# Patient Record
Sex: FEMALE | Race: WHITE | NOT HISPANIC OR LATINO | Employment: OTHER | URBAN - METROPOLITAN AREA
[De-identification: names, ages, dates, MRNs, and addresses within clinical notes are randomized per-mention and may not be internally consistent; named-entity substitution may affect disease eponyms.]

---

## 2017-01-12 ENCOUNTER — HOSPITAL ENCOUNTER (OUTPATIENT)
Dept: RADIOLOGY | Facility: HOSPITAL | Age: 62
Discharge: HOME | End: 2017-01-12
Attending: ORTHOPAEDIC SURGERY
Payer: MEDICARE

## 2017-01-12 ENCOUNTER — ALLSCRIPTS OFFICE VISIT (OUTPATIENT)
Dept: OTHER | Facility: OTHER | Age: 62
End: 2017-01-12

## 2017-01-12 DIAGNOSIS — M99.83 OTHER BIOMECHANICAL LESIONS OF LUMBAR REGION: ICD-10-CM

## 2017-01-12 PROCEDURE — 72070 X-RAY EXAM THORAC SPINE 2VWS: CPT

## 2017-02-17 ENCOUNTER — ALLSCRIPTS OFFICE VISIT (OUTPATIENT)
Dept: OTHER | Facility: OTHER | Age: 62
End: 2017-02-17

## 2017-02-27 ENCOUNTER — GENERIC CONVERSION - ENCOUNTER (OUTPATIENT)
Dept: OTHER | Facility: OTHER | Age: 62
End: 2017-02-27

## 2017-02-28 ENCOUNTER — GENERIC CONVERSION - ENCOUNTER (OUTPATIENT)
Dept: OTHER | Facility: OTHER | Age: 62
End: 2017-02-28

## 2017-03-20 ENCOUNTER — GENERIC CONVERSION - ENCOUNTER (OUTPATIENT)
Dept: OTHER | Facility: OTHER | Age: 62
End: 2017-03-20

## 2017-03-23 ENCOUNTER — GENERIC CONVERSION - ENCOUNTER (OUTPATIENT)
Dept: OTHER | Facility: OTHER | Age: 62
End: 2017-03-23

## 2017-05-08 ENCOUNTER — ALLSCRIPTS OFFICE VISIT (OUTPATIENT)
Dept: OTHER | Facility: OTHER | Age: 62
End: 2017-05-08

## 2017-07-07 ENCOUNTER — GENERIC CONVERSION - ENCOUNTER (OUTPATIENT)
Dept: OTHER | Facility: OTHER | Age: 62
End: 2017-07-07

## 2017-07-31 ENCOUNTER — ALLSCRIPTS OFFICE VISIT (OUTPATIENT)
Dept: OTHER | Facility: OTHER | Age: 62
End: 2017-07-31

## 2017-11-21 ENCOUNTER — GENERIC CONVERSION - ENCOUNTER (OUTPATIENT)
Dept: OTHER | Facility: OTHER | Age: 62
End: 2017-11-21

## 2017-12-01 ENCOUNTER — GENERIC CONVERSION - ENCOUNTER (OUTPATIENT)
Dept: OTHER | Facility: OTHER | Age: 62
End: 2017-12-01

## 2017-12-14 ENCOUNTER — GENERIC CONVERSION - ENCOUNTER (OUTPATIENT)
Dept: OTHER | Facility: OTHER | Age: 62
End: 2017-12-14

## 2017-12-15 ENCOUNTER — GENERIC CONVERSION - ENCOUNTER (OUTPATIENT)
Dept: OTHER | Facility: OTHER | Age: 62
End: 2017-12-15

## 2018-01-02 ENCOUNTER — GENERIC CONVERSION - ENCOUNTER (OUTPATIENT)
Dept: OTHER | Facility: OTHER | Age: 63
End: 2018-01-02

## 2018-01-09 NOTE — MISCELLANEOUS
Message   Recorded as Task   Date: 03/23/2017 01:16 PM, Created By: Michael Kaplan   Task Name: Care Coordination   Assigned To: [facility]   Regarding Patient: Virgil Newton, Status: Active   Comment:    Marshall Burns - 23 Mar 2017 1:16 PM     TASK CREATED  Caller: Self; Care Coordination; [phone] (Home)  s/w pt  States she is having a problem w/ her SCS  Has to turn it up to get relief of back pain but the higher frequency is causing pins and needles in her feet  Advised pt, contact [address] [place] [address], this office will fu as well  Pt states she is concerned because she is scheduled for surgery on Tuesday - surgical wound on her arm needs to be "cleaned out"  Pt states that she is aware it will be more painful than the original surgery  States they will give her a rx for vicodin  Pt is questioning how to proceed, states she is to come in for a repeat uds as her last drug screen was + for thc  Pt states she does not smoke pot but her grandson smoked pot in front of her  Advised pt, this office is not managing her pain medications  Ok to take a script from her surgeon for post operative pain  Disclose all medications on the form for the UDS - DG will review and fu if necessary  Pt verbalized understanding  Will fu w/ [facility]  DG aware   Marshall Burns - 23 Mar 2017 3:05 PM     TASK EDITED  *** FYI ***  Per Email from Benjy Romero: s/w pt, offered to meet the pt on Monday  Pt stated she is waiting for a cb from this office re: a possible apt w/ DG  S/w Pt, advised pt to proceed as discussed: ok to take the rx from surgeon for post surgical pain as this office is not managing her pain medicine  FU w/ [facility] re: scs  Next ov w/ DG is 5/12  Pt verbalized understanding  Benjy Romero is aware via email  Sean Garcia - 23 Mar 2017 3:13 PM     TASK REPLIED TO: Previously Assigned To Sean Garcia  Provider aware  Thank you  Active Problems    1   Chronic low back pain (074 2,131 81) (M54 5,G89 29)   2  Chronic myofascial pain (729 1,338 29) (M79 1,G89 29)   3  Chronic pain syndrome (338 4) (G89 4)   4  Encounter for long-term opiate analgesic use (V58 69) (Z79 891)   5  Left lumbar radiculitis (724 4) (M54 16)   6  Lumbar foraminal stenosis (724 02) (M99 83)   7  Osteoarthritis of lumbar spine with myelopathy (721 42) (M47 16)   8  Postprocedural state (V45 89) (Z98 890)   9  Pre-procedural examination (V72 84) (Z01 818)   10  Spondylolisthesis at L5-S1 level (756 12) (M43 17)   11  Uncomplicated opioid dependence (304 00) (F11 20)    Current Meds   1  Abilify 15 MG Oral Tablet (ARIPiprazole); Therapy: (Recorded:86Adv1410) to Recorded   2  AmLODIPine Besylate 5 MG Oral Tablet; Therapy: ([facility]) to Recorded   3  BuPROPion HCl ER (SR) 150 MG Oral Tablet Extended Release 12 Hour; Therapy: ([facility]) to Recorded   4  ClonazePAM 1 MG Oral Tablet; Therapy: ([facility]) to Recorded   5  [employer] Vitamin D3 CAPS; Therapy: ([facility]) to Recorded   6  Cyclobenzaprine HCl - 10 MG Oral Tablet; Take 1 po bid prn; Therapy: 00ZOM7629 to (Evaluate:97Ovi3365)  Requested for: 65IFC5607; Last   Rx:88Zfn0076 Ordered   7  FLUoxetine HCl - 40 MG Oral Capsule; Therapy: ([facility]) to Recorded   8  Hydrocodone-Acetaminophen 5-325 MG Oral Tablet; Therapy: ([facility]) to Recorded   9  Isosorbide Mononitrate 30 MG TB24;   Therapy: (Recorded:76Qsz0144) to Recorded   10  Metoprolol Tartrate 25 MG Oral Tablet; Therapy: ([facility]) to Recorded   11  Simvastatin 20 MG Oral Tablet; Therapy: ([facility]) to Recorded   12  TraZODone HCl - 50 MG Oral Tablet; Therapy: (Recorded:72Jfw4695) to Recorded    Allergies    1   No Known Drug Allergies    Signatures   Electronically signed by : Amber Ash, ; Mar 23 2017  3:16PM EST                       (Author)

## 2018-01-11 NOTE — MISCELLANEOUS
Message   Recorded as Task   Date: 11/21/2017 10:35 AM, Created By: Lizzette Israel   Task Name: Miscellaneous   Assigned To: Lizzette Israel   Regarding Patient: Saintclair Clinton, Status: Active   CommentArnell [place] - 21 Nov 2017 10:35 AM     TASK CREATED  Patient missed her appt  11/21/17 @ 10:15 AM with Sean  I called the patient & LMOM about the missed appt  & told her to call the office to reschedSean Bergman - 21 Nov 2017 10:47 AM     TASK REPLIED TO: Previously Assigned To Sean Garcia  Provider aware  Thank you  Active Problems    1  Chronic low back pain (724 2,338 29) (M54 5,G89 29)   2  Chronic lumbar radiculopathy (724 4) (M54 16)   3  Chronic myofascial pain (729 1,338 29) (M79 1,G89 29)   4  Chronic pain syndrome (338 4) (G89 4)   5  Encounter for long-term opiate analgesic use (V58 69) (Z79 891)   6  Lumbar foraminal stenosis (724 02) (M99 83)   7  Osteoarthritis of lumbar spine with myelopathy (721 42) (M47 16)   8  Postprocedural state (V45 89) (Z98 890)   9  Pre-procedural examination (V72 84) (Z01 818)   10  Spondylolisthesis at L5-S1 level (756 12) (M43 17)   11  Uncomplicated opioid dependence (304 00) (F11 20)    Current Meds   1  AmLODIPine Besylate 5 MG Oral Tablet; Therapy: (Recorded:10Bkz3262) to Recorded   2  BuPROPion HCl ER (SR) 150 MG Oral Tablet Extended Release 12 Hour; Therapy: ([facility]) to Recorded   3  ClonazePAM 1 MG Oral Tablet; Therapy: ([facility]) to Recorded   4  Cyclobenzaprine HCl - 10 MG Oral Tablet; Take 1 po bid prn; Therapy: 76ZFD9915 to (Deidre Silva)  Requested for: 70MIA6381; Last   Rx:55Cxm3609 Ordered   5  FLUoxetine HCl - 40 MG Oral Capsule; Therapy: (Ane Rho) to Recorded   6  Isosorbide Mononitrate 30 MG TB24;   Therapy: (Recorded:85Cec7354) to Recorded   7  Meloxicam 7 5 MG Oral Tablet; TAKE 1 TABLET Twice daily with food, no other NSAIDS;    Therapy: 86XLQ3145 to (Deidre Silva)  Requested for: 79NMT3770; Last   Rx:60Aij7332; Status: ACTIVE - Renewal Denied Ordered   8  Metoprolol Tartrate 25 MG Oral Tablet; Therapy: (Katie Cherry) to Recorded   9  Potassium Chloride ER 10 MEQ Oral Capsule Extended Release Recorded   10  Simvastatin 20 MG Oral Tablet; Therapy: (Katie Cherry) to Recorded   11  TraZODone HCl - 50 MG Oral Tablet; Therapy: (Recorded:02Aqz4134) to Recorded    Allergies    1  No Known Drug Allergies    2  Tape    Signatures   Electronically signed by :  Roc Thornton, ; Nov 21 2017 11:32AM EST                       (Author)

## 2018-01-13 VITALS
HEART RATE: 80 BPM | DIASTOLIC BLOOD PRESSURE: 82 MMHG | HEIGHT: 63 IN | TEMPERATURE: 98.3 F | WEIGHT: 280 LBS | SYSTOLIC BLOOD PRESSURE: 132 MMHG | BODY MASS INDEX: 49.61 KG/M2

## 2018-01-13 VITALS
HEIGHT: 63 IN | HEART RATE: 76 BPM | BODY MASS INDEX: 50.85 KG/M2 | WEIGHT: 287 LBS | DIASTOLIC BLOOD PRESSURE: 80 MMHG | TEMPERATURE: 98.4 F | SYSTOLIC BLOOD PRESSURE: 136 MMHG

## 2018-01-13 NOTE — RESULT NOTES
Message   Recorded as Task   Date: 07/06/2017 12:10 PM, Created By: Kaylene Arthur   Task Name: Miscellaneous   Assigned To: [facility]   Regarding Patient: Chance Castellano, Status: Active   CommentSameul Last - 06 Jul 2017 12:10 PM     TASK CREATED  Caller: Answering Service, Other; Other; [phone]  See below message from service  Message: PT NEEDS CLARIFICATION ON HER MEDS  FOR HER BACK PAIN  SethStacey - 06 Jul 2017 3:10 PM     TASK EDITED  Attempted to call the pt to clarify and LMOM to CB  SethStacey - 06 Jul 2017 3:10 PM     TASK IN PROGRESS   July Potter - 07 Jul 2017 1:14 PM     TASK EDITED  phone call from patient questioning why she only recieved a one month refill of meloxicam with no refills, since she is not coming back to the office for six months  please call patient at [phone]  SethStacey - 07 Jul 2017 1:18 PM     TASK EDITED  Looks like last ordered 5/17 c/ 2 refills  DG to advise  Sean Mendoza - 07 Jul 2017 1:25 PM     TASK REPLIED TO: Previously Assigned To Sean Garcia  She has an upcoming OV in July and it was a 3 month supply and she should have enough to get to her OV  SethShilay - 07 Jul 2017 1:36 PM     TASK EDITED  S/w the pt  and she stated she is out of meds  Bartholome Pinks Bartholome Pinks Called the pharmacy and the order was for BID and #30 was ordered  Yup, that is why she is out  Can you please reorder? Thanks Jeff Breaux - 07 Jul 2017 1:39 PM     TASK REPLIED TO: Previously Assigned To Sean Garcia  I escribed a script for BID dosing, #60 and that should get her to her next OV at the end of this month  Shila Ann - 07 Jul 2017 2:38 PM     TASK EDITED  S/w the pt  and she is aware          Signatures   Electronically signed by : Marty Matias, ; Jul 7 2017  2:38PM EST                       (Author)

## 2018-01-14 VITALS
HEART RATE: 80 BPM | TEMPERATURE: 98.5 F | BODY MASS INDEX: 51.91 KG/M2 | SYSTOLIC BLOOD PRESSURE: 120 MMHG | WEIGHT: 293 LBS | DIASTOLIC BLOOD PRESSURE: 82 MMHG | HEIGHT: 63 IN

## 2018-01-14 VITALS
BODY MASS INDEX: 49.6 KG/M2 | HEART RATE: 73 BPM | SYSTOLIC BLOOD PRESSURE: 145 MMHG | WEIGHT: 280 LBS | DIASTOLIC BLOOD PRESSURE: 85 MMHG

## 2018-01-14 NOTE — RESULT NOTES
Message   Recorded as Task   Date: 05/16/2016 02:41 PM, Created By: Sandra Ramos   Task Name: Follow Up   Assigned To: Kristy Armstrong   Regarding Patient: WOODY WHEELER, Status: In Progress   Comment:    Shila Ann - 16 May 2016 2:41 PM     TASK CREATED  Caller: Self; General Medical Question; [phone] (Home)  Received a vmlom at 1429  Pt  stated she would like to start the process to have an SCS trial  She has a couple of questions in relation to the process  Please advise  Thanks   Mariam Gibson - 74 May 2016 4:01 PM     TASK EDITED  Spoke with pt who wasn't sure what to do next with the steps for the trial  Advised pt that the next step would be having the psych eval done   Pt states she did get a piece of paper at her last visit and will attempt to make an appt with her psychologist    Dong Sorto - 24 Jun 2016 11:29 AM     TASK EDITED  S/w pt for update on psych eval  -states she has appt scheduled for 7/19/16 for psych eval  -pt questioned next step & informed her that as soon as we receive the psych eval report that we will forward onto [facility] to continue auth process  -pt verbalized understanding   Kristy Armstrong - 04 Aug 2016 10:03 AM     TASK EDITED  Received psych eval-waiting for approval & then can schedule   Kristy Armstrong - 08 Aug 2016 10:12 AM     TASK EDITED  Psych eval approved by Dr Josue Taylor - 15 Aug 2016 8:38 AM     TASK EDITED  Waiting on [facility] approval   Kristy Armstrong - 17 Aug 2016 8:58 AM     TASK EDITED  Approved-ok to schedule   Kristy Armstrong - 22 Aug 2016 11:27 AM     TASK EDITED  Scheduled   Pre - 9/8/16  Trial - 9/12/16  Pull - 9/16

## 2018-01-15 NOTE — MISCELLANEOUS
Message   Recorded as Task   Date: 09/27/2016 03:08 PM, Created By: Ale Hairston   Task Name: Follow Up   Assigned To: [facility]   Regarding Patient: Patricia Man, Status: In Progress   Comment:    Lisa Shea - 27 Sep 2016 3:08 PM     TASK CREATED  Caller: Self; General Medical Question; [phone] (Home)  Received VM from pt  on [facility] triage line from 245 pm  Pt  states that someone called, she is returning the call  Pt  states she can be reached at [phone]  **Appears  attempted to reach pt  yest  Do not see any notes stating pt  was called today  Paulo Mcmahon - 27 Sep 2016 3:43 PM     TASK EDITED  S/w pt  who states that someone had called to provide her Dr Rosa Pedro number  Pt  states that she already had found the number, and called to schedule her appt  Pt  appreciative of the c/b  Lisa Shea - 27 Sep 2016 3:43 PM     TASK IN PROGRESS        Active Problems    1  Chronic low back pain (724 2,338 29) (M54 5,G89 29)   2  Chronic myofascial pain (729 1,338 29) (M79 1,G89 29)   3  Chronic pain syndrome (338 4) (G89 4)   4  Left lumbar radiculitis (724 4) (M54 16)   5  Lumbar foraminal stenosis (724 02) (M99 83)   6  Osteoarthritis of lumbar spine with myelopathy (721 42) (M47 16)   7  Postprocedural state (V45 89) (Z98 89)   8  Pre-procedural examination (V72 84) (Z01 818)   9  Spondylolisthesis at L5-S1 level (756 12) (M43 17)    Current Meds   1  Abilify 15 MG Oral Tablet (ARIPiprazole); Therapy: (Recorded:66Wup8753) to Recorded   2  AmLODIPine Besylate 5 MG Oral Tablet; Therapy: (Norlin Kroner) to Recorded   3  Aspirin 81 MG CAPS; Therapy: (Recorded:97Blj0440) to Recorded   4  BuPROPion HCl ER (SR) 150 MG Oral Tablet Extended Release 12 Hour; Therapy: (Norlin Kroner) to Recorded   5   Cephalexin 500 MG Oral Capsule; TAKE 1 CAPSULE 4 TIMES DAILY UNTIL GONE;   Therapy: 57XFT9702 to (Nithin April)  Requested for: 40UJG1962; Last   Rx:58Qua3602 Ordered   6  ClonazePAM 1 MG Oral Tablet; Therapy: (Laura Prince) to Recorded   7  [employer]S Vitamin D3 CAPS; Therapy: (Laura Prince) to Recorded   8  Cyclobenzaprine HCl - 10 MG Oral Tablet; Take 1 in the AM, 1 at dinner time, and 1 at   bed for now; Therapy: 02RIW2066 to (Evaluate:08Nov2015); Last Rx:09Oct2015 Ordered   9  DrRx Flexeril 10 MG #30 Recorded   10  FLUoxetine HCl - 40 MG Oral Capsule; Therapy: (Laura Prince) to Recorded   11  Hydrocodone-Acetaminophen 5-325 MG Oral Tablet; Therapy: (Laura Prince) to Recorded   12  Isosorbide Mononitrate 30 MG TB24;    Therapy: (Recorded:28Sep2015) to Recorded   13  Metoprolol Tartrate 25 MG Oral Tablet; Therapy: (Laura Prince) to Recorded   14  Omeprazole 40 MG CPCR; Therapy: (Laura Prince) to Recorded   15  Simvastatin 20 MG Oral Tablet; Therapy: (Recorded:28Sep2015) to Recorded   16  TraZODone HCl - 50 MG Oral Tablet; Therapy: (Laura Prince) to Recorded   17  Vicodin 5-300 MG Oral Tablet Recorded    Allergies    1   No Known Drug Allergies    Signatures   Electronically signed by : Janet Arechiga RN; Sep 27 2016  3:43PM EST                       (Author)

## 2018-01-15 NOTE — MISCELLANEOUS
Message   Recorded as Task   Date: 02/23/2017 04:07 PM, Created By: Bobby Smith   Task Name: Follow Up   Assigned To: [facility]   Regarding Patient: Fito Jacome, Status: Active   CommentClara Edu - 23 Feb 2017 4:07 PM     TASK CREATED  Can you please call the patient and advise her that her baseline UDS that we performed in the office was positive for THC, the metabolite for marijuana  Unfortunately, this would not allow us to take over prescribing any opioids for her as discussed at her OV  She should f/u as scheduled in 3 months for her SCStim re-eval  Thank you  Marshall Burns - 24 Feb 2017 11:56 AM     TASK EDITED  s/w pt, advised of above  Pt denied smoking marijuana  Questioned if there is an issue with someone smoking marijuana in the house  Advised pt, that could be a problem  Also, products containing marijuana or hemp may be a problem  Pt states she will check her products  Advised pt to cb w/ questions or concerns  Confirmed ov to re-eval stim  Pt verbalized understanding  Sean Garcia - 27 Feb 2017 6:34 AM     TASK REPLIED TO: Previously Assigned To Sean Garcia  Provider aware  For now we will not be able to prescribe opioids  We will re-evaluate in the future  Thank you  Marshall Burns - 27 Feb 2017 8:43 AM     TASK EDITED  aware  addressed in 2/24/17 task        Active Problems    1  Chronic low back pain (724 2,338 29) (M54 5,G89 29)   2  Chronic myofascial pain (729 1,338 29) (M79 1,G89 29)   3  Chronic pain syndrome (338 4) (G89 4)   4  Encounter for long-term opiate analgesic use (V58 69) (Z79 891)   5  Left lumbar radiculitis (724 4) (M54 16)   6  Lumbar foraminal stenosis (724 02) (M99 83)   7  Osteoarthritis of lumbar spine with myelopathy (721 42) (M47 16)   8  Postprocedural state (V45 89) (Z98 890)   9  Pre-procedural examination (V72 84) (Z01 818)   10  Spondylolisthesis at L5-S1 level (756 12) (M43 17)   11   Uncomplicated opioid dependence (304 00) (F11 20)    Current Meds   1  Abilify 15 MG Oral Tablet (ARIPiprazole); Therapy: (Recorded:10Xra4026) to Recorded   2  AmLODIPine Besylate 5 MG Oral Tablet; Therapy: (Jc Diaz) to Recorded   3  BuPROPion HCl ER (SR) 150 MG Oral Tablet Extended Release 12 Hour; Therapy: (Jc Diaz) to Recorded   4  ClonazePAM 1 MG Oral Tablet; Therapy: (Jc Diaz) to Recorded   5  CVS Vitamin D3 CAPS; Therapy: (Jc Diaz) to Recorded   6  Cyclobenzaprine HCl - 10 MG Oral Tablet; Take 1 po bid prn; Therapy: 64YFW7944 to (Evaluate:03Ssz2633)  Requested for: 97CER6543; Last   Rx:24Cwt1242 Ordered   7  FLUoxetine HCl - 40 MG Oral Capsule; Therapy: (Jc Diaz) to Recorded   8  Hydrocodone-Acetaminophen 5-325 MG Oral Tablet; Therapy: (Jc Diaz) to Recorded   9  Isosorbide Mononitrate 30 MG TB24;   Therapy: (Recorded:89Oay0041) to Recorded   10  Metoprolol Tartrate 25 MG Oral Tablet; Therapy: (Jc Diaz) to Recorded   11  Simvastatin 20 MG Oral Tablet; Therapy: (Jc Diaz) to Recorded   12  TraZODone HCl - 50 MG Oral Tablet; Therapy: (Recorded:73Ttf6725) to Recorded    Allergies    1   No Known Drug Allergies    Signatures   Electronically signed by : Rin Rey, ; Feb 27 2017  8:43AM EST                       (Author)

## 2018-01-16 NOTE — MISCELLANEOUS
Message   Recorded as Task   Date: 02/24/2017 01:08 PM, Created By: Viry Leon   Task Name: Follow Up   Assigned To: [facility]   Regarding Patient: Krishna Watson, Status: In Progress   Comment:    Lisa Shea - 24 Feb 2017 1:08 PM     TASK CREATED  Caller: Self; General Medical Question; [phone] (Home)  Received VM from pt  on [facility] triage line from 21  pm  Pt  states that she is calling in regards to a call she received earlier about a UDS  Pt  states that she was told there was "THC in my blood " Per pt  she needs to s/w someone because "this has to be a mistake " Per pt  "I haven't smoked pot in years " Pt  requesting c/b at [phone]  Marshall Burns - 24 Feb 2017 2:44 PM     TASK EDITED  s/w pt, states something must be wrong - she hasn't smoked pot in years, "it makes me cough my head off "  Pt states her grandson smokes pot in his room  Questioned if that could be in her system  Advised pt - will relay this info to DG  It is up to DG to assess the situation and make a determination  At this point - he is not willing to prescribe opioids due to thc in the pt's urine  Pt stated that she is going to tell her grandson that he has to smoke pot outside from now on  Advised pt, will d/w DG and cb to advise  Sean Garcia - 27 Feb 2017 6:34 AM     TASK REPLIED TO: Previously Assigned To Sean Garcia  Provider aware  For now we will not prescribe any opioids  This would have to be re-evaluated in the future  Thank you  Marshall Burns - 27 Feb 2017 8:47 AM     TASK EDITED  LMOM to cb on home and cell #s  Marshall Burns - 27 Feb 2017 11:57 AM     TASK EDITED  vmm 2/27/2017 @ 1023  Pt calling  States she needs to s/w someone re: medication approval  Pt states she is scheduled for surgery at 12 - noon, anticipates she will be home by 3pm - will cb   Ok to reach pt at:  cell [phone], home [phone]   Shila Ann - 27 Feb 2017 3:40 PM     TASK EDITED  Attempted to call the pt  and LMOM to CB  Shila Ann - 27 Feb 2017 3:40 PM     TASK IN PROGRESS   Marshall Burns - 28 Feb 2017 10:52 AM     TASK EDITED  s/w pt, advised of above  pt verbalized understanding and appreciation  Confirmed fu ov on 5/12/17  Active Problems    1  Chronic low back pain (724 2,338 29) (M54 5,G89 29)   2  Chronic myofascial pain (729 1,338 29) (M79 1,G89 29)   3  Chronic pain syndrome (338 4) (G89 4)   4  Encounter for long-term opiate analgesic use (V58 69) (Z79 891)   5  Left lumbar radiculitis (724 4) (M54 16)   6  Lumbar foraminal stenosis (724 02) (M99 83)   7  Osteoarthritis of lumbar spine with myelopathy (721 42) (M47 16)   8  Postprocedural state (V45 89) (Z98 890)   9  Pre-procedural examination (V72 84) (Z01 818)   10  Spondylolisthesis at L5-S1 level (756 12) (M43 17)   11  Uncomplicated opioid dependence (304 00) (F11 20)    Current Meds   1  Abilify 15 MG Oral Tablet (ARIPiprazole); Therapy: (Recorded:53Rpw7897) to Recorded   2  AmLODIPine Besylate 5 MG Oral Tablet; Therapy: (Sara Mcdonald) to Recorded   3  BuPROPion HCl ER (SR) 150 MG Oral Tablet Extended Release 12 Hour; Therapy: (Sara Mcdonald) to Recorded   4  ClonazePAM 1 MG Oral Tablet; Therapy: (Sara Mcdonald) to Recorded   5  CVS Vitamin D3 CAPS; Therapy: (Sara Mcdonald) to Recorded   6  Cyclobenzaprine HCl - 10 MG Oral Tablet; Take 1 po bid prn; Therapy: 01LID9707 to (Evaluate:64Oee8859)  Requested for: 93UPS7072; Last   Rx:16Ovz4146 Ordered   7  FLUoxetine HCl - 40 MG Oral Capsule; Therapy: (Sara Mcdonald) to Recorded   8  Hydrocodone-Acetaminophen 5-325 MG Oral Tablet; Therapy: (Sara Mcdonald) to Recorded   9  Isosorbide Mononitrate 30 MG TB24;   Therapy: (Recorded:47Mzm1790) to Recorded   10  Metoprolol Tartrate 25 MG Oral Tablet; Therapy: (Sara Mcdonald) to Recorded   11  Simvastatin 20 MG Oral Tablet; Therapy: (Sara Mcdonald) to Recorded   12   TraZODone HCl - 50 MG Oral Tablet; Therapy: (Recorded:67Lry9830) to Recorded    Allergies    1   No Known Drug Allergies    Signatures   Electronically signed by : Amber Ash, ; Feb 28 2017 10:53AM EST                       (Author)

## 2018-01-16 NOTE — MISCELLANEOUS
Message   Recorded as Task   Date: 03/17/2017 03:15 PM, Created By: Emily Uriostegui   Task Name: Miscellaneous   Assigned To: [facility]   Regarding Patient: Rivka Mendez, Status: In Progress   Terrence Panchal - 17 Mar 2017 3:15 PM     TASK CREATED  PT called and stated she is having pain and fell x2 in the snow  Stated she doesn't smoke pot  Is looking for Norco     Call back number: [phone]   Marshall Burns - 17 Mar 2017 3:42 PM     TASK EDITED  s/w pt, states 3 days ago she was on her way to her hand dr Neal Lau, fu after surgery  Fell on ice  Pt c/o increased pain in L knee, back, arm and wrists  Per pt, pain continues unrelieved  Pt denies ice / heat  Denies otc pain relievers  Per pt, no relief w/ tramadol per Dr Emily Lau  Pt is requesting an ov to repeat uds  "Since I don't have thc in my system anymore"  Pt is requesting something stronger for pain  Advisd pt, will d/w dg and cb to advise  Sean Garcia - 17 Mar 2017 4:05 PM     TASK REPLIED TO: Previously Assigned To Sean Garcia  Advise her that we are not prescribing her Tramadol and we would have to proceed with a repeat UDS first, but in the mean time she has to see if her PCP is willing to prescribe  There is nothing I can do without a repeat UDS and the final results first    Renita Tello - 17 Mar 2017 4:20 PM     TASK EDITED  S/w pt regarding above  Pt stated that she would call her primary care physician  Emotional support provided  Active Problems    1  Chronic low back pain (724 2,338 29) (M54 5,G89 29)   2  Chronic myofascial pain (729 1,338 29) (M79 1,G89 29)   3  Chronic pain syndrome (338 4) (G89 4)   4  Encounter for long-term opiate analgesic use (V58 69) (Z79 891)   5  Left lumbar radiculitis (724 4) (M54 16)   6  Lumbar foraminal stenosis (724 02) (M99 83)   7  Osteoarthritis of lumbar spine with myelopathy (721 42) (M47 16)   8  Postprocedural state (V45 89) (Z98 890)   9   Pre-procedural examination (V72 84) (Z01 818)   10  Spondylolisthesis at L5-S1 level (756 12) (M43 17)   11  Uncomplicated opioid dependence (304 00) (F11 20)    Current Meds   1  Abilify 15 MG Oral Tablet (ARIPiprazole); Therapy: (Recorded:50Xwi5341) to Recorded   2  AmLODIPine Besylate 5 MG Oral Tablet; Therapy: (Juliet Gaw) to Recorded   3  BuPROPion HCl ER (SR) 150 MG Oral Tablet Extended Release 12 Hour; Therapy: (Juliet Gaw) to Recorded   4  ClonazePAM 1 MG Oral Tablet; Therapy: (Juliet Gaw) to Recorded   5  [employer]S Vitamin D3 CAPS; Therapy: (Juliet Gaw) to Recorded   6  Cyclobenzaprine HCl - 10 MG Oral Tablet; Take 1 po bid prn; Therapy: 05YVG9388 to (Evaluate:96Xlv0522)  Requested for: 23ZAM8905; Last   Rx:77Xxu8393 Ordered   7  FLUoxetine HCl - 40 MG Oral Capsule; Therapy: (Juliet Gaw) to Recorded   8  Hydrocodone-Acetaminophen 5-325 MG Oral Tablet; Therapy: (Juliet Gaw) to Recorded   9  Isosorbide Mononitrate 30 MG TB24;   Therapy: (Recorded:64Pqr7437) to Recorded   10  Metoprolol Tartrate 25 MG Oral Tablet; Therapy: (Juliet Gaw) to Recorded   11  Simvastatin 20 MG Oral Tablet; Therapy: (Juliet Gaw) to Recorded   12  TraZODone HCl - 50 MG Oral Tablet; Therapy: (Recorded:83Ics3138) to Recorded    Allergies    1   No Known Drug Allergies    Signatures   Electronically signed by : Jasiel Ortiz, ; Mar 20 2017 11:22AM EST                       (Author)

## 2018-01-16 NOTE — RESULT NOTES
Message   Recorded as Task   Date: 09/23/2016 09:59 AM, Created By: Viktoria Libman   Task Name: Follow Up   Assigned To: [facility] [place] [facility]   Regarding Patient: Sydni Metcalf, Status: Active   Comment:    Marshall Burns - 23 Sep 2016 9:59 AM     TASK CREATED  S/p SCS trial 9/22/2016  Ov w/ DG 9/26/2016 @ 1015 - Lead pull    s/w pt, pt states the procedure site is sore, historical back and leg pain is relieved  Pt noted the "massage, tingling" is noticeable, but it takes away the pain  Questioned how to shut the scs off - to drive  Advised pt, [facility] will be calling every day to discuss w/ the pt  Confirmed pt has [facility] Phone # - advised pt to contact Pipo Early re: how to shut the scs off  Advised pt to keep the dressing c/d/i, watch for any redness, drainage, swelling at the site, fever 100+  take abx as prescribed and to completeion  Limit bending, lifting, twisting  Pt verbalized understanding and confirmed ov on 9/26/2016 at 1015  Zechariah Manuel - 23 Sep 2016 10:17 AM     TASK REPLIED TO: Previously Assigned To [facility] [place] [facility],Team                      aware        Signatures   Electronically signed by :  Navi Nye, ; Sep 23 2016  2:10PM EST                       (Author)

## 2018-01-17 NOTE — MISCELLANEOUS
Message   Recorded as Task   Date: 09/26/2016 10:44 AM, Created By: System   Task Name: Schedule Appointment   Assigned To: [facility],Team   Regarding Patient: Patricia Man, Status: Active   Comment:    System - 26 Sep 2016 10:44 AM     Preferred Communication: Mail  Ordering Site: [facility]    Referred To: Sherley Chaney MD, Bobby Carrera  Neurosurgery  To Be Done: 26 Sep 2016   Marisabel Pina - 26 Sep 2016 2:33 PM     TASK EDITED  PT CALLED STATING THAT SHE HAD HER SCS TRIAL REMOVED TODAY FROM DR Nicki Salguero AND IS BEING REFERRED TO SEE DR Sebas Conley FOR PERM SCS  Hina Grider - 26 Sep 2016 2:46 PM     TASK EDITED  SCHEDULED PT WITH DR Sebas Conley 10/31/16 @ 930AM IN [facility] OFFICE  MAILED NPP  Active Problems    1  Chronic low back pain (724 2,338 29) (M54 5,G89 29)   2  Chronic myofascial pain (729 1,338 29) (M79 1,G89 29)   3  Chronic pain syndrome (338 4) (G89 4)   4  Left lumbar radiculitis (724 4) (M54 16)   5  Lumbar foraminal stenosis (724 02) (M99 83)   6  Osteoarthritis of lumbar spine with myelopathy (721 42) (M47 16)   7  Postprocedural state (V45 89) (Z98 89)   8  Pre-procedural examination (V72 84) (Z01 818)   9  Spondylolisthesis at L5-S1 level (756 12) (M43 17)    Current Meds   1  Abilify 15 MG Oral Tablet (ARIPiprazole); Therapy: (Recorded:77Ivm3956) to Recorded   2  AmLODIPine Besylate 5 MG Oral Tablet; Therapy: (Inessa Pond) to Recorded   3  Aspirin 81 MG CAPS; Therapy: (Recorded:08Mkh9147) to Recorded   4  BuPROPion HCl ER (SR) 150 MG Oral Tablet Extended Release 12 Hour; Therapy: (Inessa Pond) to Recorded   5  Cephalexin 500 MG Oral Capsule; TAKE 1 CAPSULE 4 TIMES DAILY UNTIL GONE;   Therapy: 48MTW8956 to (Evaluate:03Mhu3767)  Requested for: 91QRG1119; Last   Rx:[id number] Ordered   6  ClonazePAM 1 MG Oral Tablet; Therapy: (Inessa Pond) to Recorded   7  [facility] Vitamin D3 CAPS; Therapy: (Inessa Pond) to Recorded   8   Cyclobenzaprine HCl - 10 MG Oral Tablet; Take 1 in the AM, 1 at dinner time, and 1 at   bed for now; Therapy: 16GAG7853 to (Evaluate:08Nov2015); Last Rx:09Oct2015 Ordered   9  DrRx Flexeril 10 MG #30 Recorded   10  FLUoxetine HCl - 40 MG Oral Capsule; Therapy: (Lucy Johnson) to Recorded   11  Hydrocodone-Acetaminophen 5-325 MG Oral Tablet; Therapy: (Lucy Johnson) to Recorded   12  Isosorbide Mononitrate 30 MG TB24;    Therapy: (Recorded:28Sep2015) to Recorded   13  Metoprolol Tartrate 25 MG Oral Tablet; Therapy: (Lucy Johnson) to Recorded   14  Omeprazole 40 MG CPCR; Therapy: (Lucy Johnson) to Recorded   15  Simvastatin 20 MG Oral Tablet; Therapy: (Recorded:28Sep2015) to Recorded   16  TraZODone HCl - 50 MG Oral Tablet; Therapy: (Lucy Johnson) to Recorded   17  Vicodin 5-300 MG Oral Tablet Recorded    Allergies    1  No Known Drug Allergies    Signatures   Electronically signed by :  Zoe Ramon, ; Sep 26 2016  3:05PM EST                       (Author)

## 2018-01-23 NOTE — MISCELLANEOUS
Message   Recorded as Task   Date: 11/28/2017 04:08 PM, Created By: Emanuel Goel   Task Name: Miscellaneous   Assigned To: [facility] [place] [facility],Team   Regarding Patient: Orquidea Sifuentes, Status: Active   Comment:    Emanuel Goel - 28 Nov 2017 4:08 PM     TASK CREATED  phone call from patient questioning if she can be prescribed meloxicam for her hand pain  please call patient at [phone]  Marshall Burns - 29 Nov 2017 9:26 AM     TASK EDITED  LMOM to cb on home #, no vm on cell  Sara Carpenter - 29 Nov 2017 12:39 PM     TASK EDITED  Pt returned call and states that she already takes Meloxicam 7 5 mg  2x daily  States that she fell about 2 weeks ago and injured her wrist  She is not able to take ibuprophen with the Meloxicam so she is asking if she can just increase the dosage to help with the pain  Pt can be reached at [phone]  Marshall Burns - 29 Nov 2017 2:36 PM     TASK EDITED  LMOM to cb  Provided cb number and office hours  Advised pt to request to be tx'd to the triage nurse  **Please tx the caller to the nurse  Thanks! Fatuma Lamb - 01 Dec 2017 12:34 PM     TASK EDITED  pt returning call tried to get a hold of the nurse and was unavailable please call pt back at [phone]   Marshall Burns - 01 Dec 2017 1:50 PM     TASK EDITED  s/w pt, states she fell x 2 weeks ago  X rays at [place], no fx, fu w/ hand dr  Per pt L hand pain is better, R hand pain continues  Per Pt, Dr Connle[facility] suggested pt fu w/ pain management - increase antinflammatory  Pt confirmed meloxicam 7 5 mg bid w/ no relief of hand pain, no se's  Advised pt, will d/w DG and cb to advise  Sean Garcia - 01 Dec 2017 1:55 PM     TASK REPLIED TO: Previously Assigned To Sean Garcia  We can prescribe a medrol dose ani to help with inflammation and then while on the ani she can't take the meloxicam  She can also continue her cyclobenzaprine as prescribed  Thank you     Marshall Burns - 01 Dec 2017 2:07 PM     TASK EDITED  s/w pt, advised of above  Pt verbalized understanding  Medrol dose pack called in as discussed  Pt aware  Active Problems    1  Chronic low back pain (724 2,338 29) (M54 5,G89 29)   2  Chronic lumbar radiculopathy (724 4) (M54 16)   3  Chronic myofascial pain (729 1,338 29) (M79 1,G89 29)   4  Chronic pain syndrome (338 4) (G89 4)   5  Encounter for long-term opiate analgesic use (V58 69) (Z79 891)   6  Lumbar foraminal stenosis (724 02) (M99 83)   7  Osteoarthritis of lumbar spine with myelopathy (721 42) (M47 16)   8  Postprocedural state (V45 89) (Z98 890)   9  Pre-procedural examination (V72 84) (Z01 818)   10  Spondylolisthesis at L5-S1 level (756 12) (M43 17)   11  Uncomplicated opioid dependence (304 00) (F11 20)    Current Meds   1  AmLODIPine Besylate 5 MG Oral Tablet; Therapy: (Recorded:55Pzc3943) to Recorded   2  BuPROPion HCl ER (SR) 150 MG Oral Tablet Extended Release 12 Hour; Therapy: ([place] Crea) to Recorded   3  ClonazePAM 1 MG Oral Tablet; Therapy: ([place] Crea) to Recorded   4  Cyclobenzaprine HCl - 10 MG Oral Tablet; Take 1 po bid prn; Therapy: 04QFZ0538 to (Lalo Pandya)  Requested for: 24VFH0560; Last   Rx:[id number] Ordered   5  FLUoxetine HCl - 40 MG Oral Capsule; Therapy: ([place] Crea) to Recorded   6  Isosorbide Mononitrate 30 MG TB24;   Therapy: (Recorded:66Ojm5674) to Recorded   7  Meloxicam 7 5 MG Oral Tablet; TAKE 1 TABLET Twice daily with food, no other NSAIDS; Therapy: 89KBD1199 to (Lalo Pandya)  Requested for: 15ARQ7844; Last   Rx:08Euq1104; Status: ACTIVE - Renewal Denied Ordered   8  Metoprolol Tartrate 25 MG Oral Tablet; Therapy: ([place] Crea) to Recorded   9  Potassium Chloride ER 10 MEQ Oral Capsule Extended Release Recorded   10  Simvastatin 20 MG Oral Tablet; Therapy: ([place] Crea) to Recorded   11  TraZODone HCl - 50 MG Oral Tablet;     Therapy: (Recorded:79Xlk3285) to Recorded    Allergies 1  No Known Drug Allergies    2   Tape    Signatures   Electronically signed by : Maximus Gramajo, ; Dec  1 2017  2:07PM EST                       (Author)

## 2018-01-23 NOTE — MISCELLANEOUS
Message   Recorded as Task   Date: 01/02/2018 08:50 AM, Created By: Rylie Lopez   Task Name: Miscellaneous   Assigned To: [facility] [place] [facility],Team   Regarding Patient: Orquidea Sifuentes, Status: Active   Comment:    Amber Peterson - 02 Jan 2018 8:50 AM     TASK CREATED  Pt called stating she was trying to stop taking Flexeril but she said the cramps are coming back in her back and legs  She is asking for a script for it again and would like it sent to [facility] pharmacy on file  She is out of medication  Pt can be reached at [phone]  Marshall Burns - 02 Jan 2018 10:39 AM     TASK EDITED  s/w pt, states she decreased a lot of her medications including flexeril and has been feeling much better  However, pt states she is having increased cramping at this time in her hands, arms and legs  Per pt flexeril 10 mg 1 tab po bid w/ + relief / no se's in the past  Pt confirmed pharmacy per allscripts  Advised pt, will d/w DG and cb if there is any issue with this request  Anticipate a rx will be sent to her pharmacy for pu later today  Scheduled 6 mo / 24 w fu ov on 5/31 w/ DG      ***ok to refill flexeril 10 mg 1 tab po bid prn spasm #60 / 0? Zechariah Manuel - 02 Jan 2018 10:46 AM     TASK REPLIED TO: Previously Assigned To Zechariah Manuel  sent in refill per allscripts--please revere possible side effects   Marshall Burns - 02 Jan 2018 11:08 AM     TASK EDITED  s/w pt, advised of above  Pt is aware, this med may cause drowsiness, do not drive or operate machinery until you are familiar with this medication  Cb if se's, questions or concerns arise  pt verbalized understanding and appreciation  Active Problems    1  Chronic hand pain, right (729 5,338 29) (M79 641,G89 29)   2  Chronic low back pain (724 2,338 29) (M54 5,G89 29)   3  Chronic lumbar radiculopathy (724 4) (M54 16)   4  Chronic myofascial pain (729 1,338 29) (M79 1,G89 29)   5  Chronic pain syndrome (338 4) (G89 4)   6   Encounter for long-term opiate analgesic use (V58 69) (Z79 891)   7  Lumbar foraminal stenosis (724 02) (M99 83)   8  Osteoarthritis of lumbar spine with myelopathy (721 42) (M47 16)   9  Postprocedural state (V45 89) (Z98 890)   10  Pre-procedural examination (V72 84) (Z01 818)   11  Spondylolisthesis at L5-S1 level (756 12) (M43 17)   12  Uncomplicated opioid dependence (304 00) (F11 20)    Current Meds   1  AmLODIPine Besylate 5 MG Oral Tablet; Therapy: (Recorded:52Aoz8601) to Recorded   2  ClonazePAM 1 MG Oral Tablet; Therapy: (Charles Monet) to Recorded   3  Cyclobenzaprine HCl - 10 MG Oral Tablet; Take 1 po bid prn; Therapy: 79OSS4040 to (Evaluate:01Jun2018)  Requested for: 67PRT5391; Last   Rx:02Jan2018 Ordered   4  Diclofenac Sodium 1 % Transdermal Gel; Apply 4 grams to affected area QID PRN; Therapy: 99MTK8368 to (Evaluate:12Jun2018)  Requested for: 56EYU2448; Last   Rx:93Jsw7524 Ordered   5  FLUoxetine HCl - 40 MG Oral Capsule; Therapy: (Charles Monet) to Recorded   6  Isosorbide Mononitrate 30 MG TB24;   Therapy: (Recorded:13Aoo6981) to Recorded   7  Meloxicam 7 5 MG Oral Tablet; TAKE 1 TABLET Twice daily with food, no other NSAIDS; Therapy: 85DLP7514 to (Evaluate:12Jun2018)  Requested for: 98CZR6429; Last   Rx:29Boz0752 Ordered   8  Metoprolol Tartrate 25 MG Oral Tablet; Therapy: (Charles Monet) to Recorded   9  Potassium Chloride ER 10 MEQ Oral Capsule Extended Release Recorded   10  Simvastatin 20 MG Oral Tablet; Therapy: (Recorded:05Kwc0677) to Recorded    Allergies    1  No Known Drug Allergies    2   Tape    Signatures   Electronically signed by : Kain Champion, ; Jan 2 2018 11:09AM EST                       (Author)

## 2018-01-23 NOTE — MISCELLANEOUS
Message   Recorded as Task   Date: 12/11/2017 03:31 PM, Created By: Deborah Torres   Task Name: Miscellaneous   Assigned To: [facility] [place] [facility],Team   Regarding Patient: Dakota Lawrence, Status: In Progress   Comment:    AshleymalgorzataAmber charles - 11 Dec 2017 3:31 PM     TASK CREATED  Pt called requesting a refill of meloxicam   She said she is out of it  She uses [facility] pharmacy (on file in allscripts)  Pt can be reached at [phone]  Doreen Yi - 11 Dec 2017 3:38 PM     TASK REASSIGNED: Previously Assigned To [address] [place] [address] clinical,Team  Patient has an appt 12/14 with you, please advise   Sean Garcia - 11 Dec 2017 3:45 PM     TASK REPLIED TO: Previously Assigned To Sean Garcia  I escribed a 30 day supply of the meloxicam to get her to her OV with me  Thank you  Shila Ann - 11 Dec 2017 4:06 PM     TASK EDITED  Attempted to call the pt  and left a detailed mom in regards to the refill  Pt  to Cb if any questions  Shila Ann - 11 Dec 2017 4:06 PM     TASK IN PROGRESS        Active Problems    1  Chronic hand pain, right (729 5,338 29) (M79 641,G89 29)   2  Chronic low back pain (724 2,338 29) (M54 5,G89 29)   3  Chronic lumbar radiculopathy (724 4) (M54 16)   4  Chronic myofascial pain (729 1,338 29) (M79 1,G89 29)   5  Chronic pain syndrome (338 4) (G89 4)   6  Encounter for long-term opiate analgesic use (V58 69) (Z79 891)   7  Lumbar foraminal stenosis (724 02) (M99 83)   8  Osteoarthritis of lumbar spine with myelopathy (721 42) (M47 16)   9  Postprocedural state (V45 89) (Z98 890)   10  Pre-procedural examination (V72 84) (Z01 818)   11  Spondylolisthesis at L5-S1 level (756 12) (M43 17)   12  Uncomplicated opioid dependence (304 00) (F11 20)    Current Meds   1  AmLODIPine Besylate 5 MG Oral Tablet; Therapy: (Recorded:69Saz7787) to Recorded   2  ClonazePAM 1 MG Oral Tablet; Therapy: (Clinton Paulino) to Recorded   3  Diclofenac Sodium 1 % Transdermal Gel;  Apply 4 grams to affected area QID PRN; Therapy: 19BTU6516 to (Evaluate:12Jun2018)  Requested for: 62OBG9369; Last   Rx:85Zmd8105 Ordered   4  FLUoxetine HCl - 40 MG Oral Capsule; Therapy: (Irving Dubose) to Recorded   5  Isosorbide Mononitrate 30 MG TB24;   Therapy: (Recorded:00Eqd4697) to Recorded   6  Meloxicam 7 5 MG Oral Tablet; TAKE 1 TABLET Twice daily with food, no other NSAIDS; Therapy: 87ZOS4371 to (Evaluate:12Jun2018)  Requested for: 82WVY6060; Last   Rx:46Hrb6968 Ordered   7  Metoprolol Tartrate 25 MG Oral Tablet; Therapy: (Irving Dubose) to Recorded   8  Potassium Chloride ER 10 MEQ Oral Capsule Extended Release Recorded   9  Simvastatin 20 MG Oral Tablet; Therapy: (Recorded:27Ctw7357) to Recorded    Allergies    1  No Known Drug Allergies    2   Tape    Signatures   Electronically signed by : Joshua Nieves, ; Dec 15 2017 10:24AM EST                       (Author)

## 2018-01-24 VITALS
HEART RATE: 92 BPM | TEMPERATURE: 98.3 F | WEIGHT: 288 LBS | HEIGHT: 63 IN | SYSTOLIC BLOOD PRESSURE: 150 MMHG | DIASTOLIC BLOOD PRESSURE: 84 MMHG | BODY MASS INDEX: 51.03 KG/M2

## 2018-05-21 ENCOUNTER — TELEPHONE (OUTPATIENT)
Dept: PAIN MEDICINE | Facility: CLINIC | Age: 63
End: 2018-05-21

## 2018-05-21 DIAGNOSIS — G89.29 CHRONIC MYOFASCIAL PAIN: ICD-10-CM

## 2018-05-21 DIAGNOSIS — M54.41 CHRONIC BILATERAL LOW BACK PAIN WITH BILATERAL SCIATICA: Primary | ICD-10-CM

## 2018-05-21 DIAGNOSIS — M79.641 CHRONIC HAND PAIN, RIGHT: ICD-10-CM

## 2018-05-21 DIAGNOSIS — M54.42 CHRONIC BILATERAL LOW BACK PAIN WITH BILATERAL SCIATICA: Primary | ICD-10-CM

## 2018-05-21 DIAGNOSIS — M79.18 CHRONIC MYOFASCIAL PAIN: ICD-10-CM

## 2018-05-21 DIAGNOSIS — G89.29 CHRONIC HAND PAIN, RIGHT: ICD-10-CM

## 2018-05-21 DIAGNOSIS — G89.29 CHRONIC BILATERAL LOW BACK PAIN WITH BILATERAL SCIATICA: Primary | ICD-10-CM

## 2018-05-21 DIAGNOSIS — M43.17 SPONDYLOLISTHESIS AT L5-S1 LEVEL: ICD-10-CM

## 2018-05-21 PROBLEM — F11.20 UNCOMPLICATED OPIOID DEPENDENCE (HCC): Status: RESOLVED | Noted: 2017-02-17 | Resolved: 2018-05-21

## 2018-05-21 PROBLEM — F11.20 UNCOMPLICATED OPIOID DEPENDENCE (HCC): Status: ACTIVE | Noted: 2017-02-17

## 2018-05-21 RX ORDER — MELOXICAM 7.5 MG/1
TABLET ORAL
Qty: 60 TABLET | Refills: 0 | Status: SHIPPED | OUTPATIENT
Start: 2018-05-21 | End: 2018-05-31 | Stop reason: SDUPTHER

## 2018-05-21 NOTE — TELEPHONE ENCOUNTER
I e-scribed a 30 day supply refill of her meloxicam to her pharmacy  She should f/u as scheduled  Thank you

## 2018-05-21 NOTE — TELEPHONE ENCOUNTER
Pt called requesting a refill on her Meloxicam 7 5mg  [facility] [place]  Pharmacy number is [phone]

## 2018-05-26 RX ORDER — CYCLOBENZAPRINE HCL 10 MG
TABLET ORAL
Qty: 60 TABLET | Refills: 4 | OUTPATIENT
Start: 2018-05-26

## 2018-05-30 ENCOUNTER — TELEPHONE (OUTPATIENT)
Dept: PAIN MEDICINE | Facility: CLINIC | Age: 63
End: 2018-05-30

## 2018-05-30 DIAGNOSIS — M79.641 CHRONIC HAND PAIN, RIGHT: ICD-10-CM

## 2018-05-30 DIAGNOSIS — G89.29 CHRONIC BILATERAL LOW BACK PAIN WITH BILATERAL SCIATICA: ICD-10-CM

## 2018-05-30 DIAGNOSIS — M54.41 CHRONIC BILATERAL LOW BACK PAIN WITH BILATERAL SCIATICA: ICD-10-CM

## 2018-05-30 DIAGNOSIS — M79.18 CHRONIC MYOFASCIAL PAIN: ICD-10-CM

## 2018-05-30 DIAGNOSIS — M54.42 CHRONIC BILATERAL LOW BACK PAIN WITH BILATERAL SCIATICA: ICD-10-CM

## 2018-05-30 DIAGNOSIS — G89.29 CHRONIC HAND PAIN, RIGHT: ICD-10-CM

## 2018-05-30 DIAGNOSIS — G89.29 CHRONIC MYOFASCIAL PAIN: ICD-10-CM

## 2018-05-30 DIAGNOSIS — M43.17 SPONDYLOLISTHESIS AT L5-S1 LEVEL: ICD-10-CM

## 2018-05-30 NOTE — TELEPHONE ENCOUNTER
Patient left a message with the Answering Service to cancel her appt  5/31/18 @ 9:15 AM with Sean  I called the patient & we rescheduled her appt for 7/6/18 with Sean  The patient is concerned because she is leaving to visit her sister in [place] for 1 month & will run out of medications  Can her medications be called to the [facility]?  Patient's phone number is [phone]  Thank you

## 2018-05-31 DIAGNOSIS — M79.18 CHRONIC MYOFASCIAL PAIN: Primary | ICD-10-CM

## 2018-05-31 DIAGNOSIS — G89.29 CHRONIC MYOFASCIAL PAIN: Primary | ICD-10-CM

## 2018-05-31 RX ORDER — MELOXICAM 7.5 MG/1
TABLET ORAL
Qty: 60 TABLET | Refills: 1 | Status: SHIPPED | OUTPATIENT
Start: 2018-05-31 | End: 2018-07-06 | Stop reason: SDUPTHER

## 2018-05-31 RX ORDER — CYCLOBENZAPRINE HCL 10 MG
10 TABLET ORAL 2 TIMES DAILY PRN
Qty: 40 TABLET | Refills: 1 | Status: SHIPPED | OUTPATIENT
Start: 2018-05-31 | End: 2018-07-06 | Stop reason: SDUPTHER

## 2018-05-31 NOTE — TELEPHONE ENCOUNTER
I e-scribed a 30 day supply of the Flexeril with a refill to get her to her next OV as scheduled  Thank you

## 2018-05-31 NOTE — TELEPHONE ENCOUNTER
Can you please call the patient and advise her that I e-scribed a 30 day supply of her Mobic with a refill to get her to her OV in July  Thank you

## 2018-05-31 NOTE — TELEPHONE ENCOUNTER
s/w pt, advised of above  Pt questioned flexeril refill  Confirmed 10 mg, 1 tab po bid  Advised pt, anticipate a refill will be sent to the pharmacy for pu  This office will cb if there is an issue or question w/ this request  Pt verbalized understanding and appreciation  Pending refill request sent under seperate task

## 2018-05-31 NOTE — PROGRESS NOTES
Please review pending refill request: Flexeril 10mg 1 tab po bid prn #40 / 1 last rx 1/2/2018 w/ 4 refills per SL

## 2018-07-02 ENCOUNTER — TELEPHONE (OUTPATIENT)
Dept: PAIN MEDICINE | Facility: CLINIC | Age: 63
End: 2018-07-02

## 2018-07-02 NOTE — TELEPHONE ENCOUNTER
Patient left message 07/02/18 @4:04p  Ameya Frazier       Patient would like to know who she should speak to about back stimulator

## 2018-07-03 NOTE — TELEPHONE ENCOUNTER
Clarified w/ DG, pt does not need reprogramming  [facility] does not need to be at the ov  DG verbalized understanding - will cancel [facility] for 7/6

## 2018-07-03 NOTE — TELEPHONE ENCOUNTER
I sent an e-mail to Scarlett and she will make sure that someone is at her [address] on 7/6/18 for re-programming  Thank you

## 2018-07-03 NOTE — TELEPHONE ENCOUNTER
S/w pt, questioning if she should call [facility] or if this office calls [facility] to make them aware of her ov on 7/6  Pt states that she is not having any issue managing her pain, does not feel like she needs to have her scs adjusted  Advised pt, anticipate [facility] will not be at the appt  DG will review / update medications  If something changes - [facility]  can meet the pt at her next ov or another time without an ov  Pt verbalized understanding and appreciation       Forwarding to Karen Menjivar negative...

## 2018-07-06 ENCOUNTER — OFFICE VISIT (OUTPATIENT)
Dept: PAIN MEDICINE | Facility: CLINIC | Age: 63
End: 2018-07-06
Payer: MEDICARE

## 2018-07-06 VITALS
BODY MASS INDEX: 51.91 KG/M2 | HEIGHT: 63 IN | HEART RATE: 64 BPM | SYSTOLIC BLOOD PRESSURE: 118 MMHG | WEIGHT: 293 LBS | DIASTOLIC BLOOD PRESSURE: 60 MMHG

## 2018-07-06 DIAGNOSIS — G89.29 CHRONIC MYOFASCIAL PAIN: ICD-10-CM

## 2018-07-06 DIAGNOSIS — M54.42 CHRONIC BILATERAL LOW BACK PAIN WITH BILATERAL SCIATICA: Primary | ICD-10-CM

## 2018-07-06 DIAGNOSIS — M48.061 LUMBAR FORAMINAL STENOSIS: ICD-10-CM

## 2018-07-06 DIAGNOSIS — M79.641 CHRONIC HAND PAIN, RIGHT: ICD-10-CM

## 2018-07-06 DIAGNOSIS — M79.18 CHRONIC MYOFASCIAL PAIN: ICD-10-CM

## 2018-07-06 DIAGNOSIS — G89.4 CHRONIC PAIN DISORDER: ICD-10-CM

## 2018-07-06 DIAGNOSIS — M54.41 CHRONIC BILATERAL LOW BACK PAIN WITH BILATERAL SCIATICA: Primary | ICD-10-CM

## 2018-07-06 DIAGNOSIS — M54.16 CHRONIC LUMBAR RADICULOPATHY: ICD-10-CM

## 2018-07-06 DIAGNOSIS — G89.29 CHRONIC HAND PAIN, RIGHT: ICD-10-CM

## 2018-07-06 DIAGNOSIS — G89.29 CHRONIC BILATERAL LOW BACK PAIN WITH BILATERAL SCIATICA: Primary | ICD-10-CM

## 2018-07-06 DIAGNOSIS — M47.16 OSTEOARTHRITIS OF LUMBAR SPINE WITH MYELOPATHY: ICD-10-CM

## 2018-07-06 DIAGNOSIS — M43.17 SPONDYLOLISTHESIS AT L5-S1 LEVEL: ICD-10-CM

## 2018-07-06 PROBLEM — M65.30 ACQUIRED TRIGGER FINGER: Status: ACTIVE | Noted: 2017-08-30

## 2018-07-06 PROBLEM — G56.00 CARPAL TUNNEL SYNDROME: Status: ACTIVE | Noted: 2017-08-30

## 2018-07-06 PROBLEM — M93.1 KIENBOCK'S DISEASE OF ADULTS: Status: ACTIVE | Noted: 2018-02-13

## 2018-07-06 PROBLEM — M79.643 HAND PAIN: Status: ACTIVE | Noted: 2017-08-30

## 2018-07-06 PROBLEM — G56.20 ULNAR NERVE ABNORMALITY: Status: ACTIVE | Noted: 2017-08-30

## 2018-07-06 PROCEDURE — 99213 OFFICE O/P EST LOW 20 MIN: CPT | Performed by: NURSE PRACTITIONER

## 2018-07-06 RX ORDER — PANTOPRAZOLE SODIUM 40 MG/1
40 TABLET, DELAYED RELEASE ORAL DAILY
Refills: 1 | COMMUNITY
Start: 2018-06-08 | End: 2021-07-22 | Stop reason: HOSPADM

## 2018-07-06 RX ORDER — CYCLOBENZAPRINE HCL 10 MG
TABLET ORAL
Qty: 60 TABLET | Refills: 5 | Status: SHIPPED | OUTPATIENT
Start: 2018-07-06 | End: 2021-07-22 | Stop reason: HOSPADM

## 2018-07-06 RX ORDER — MELOXICAM 7.5 MG/1
TABLET ORAL
Qty: 60 TABLET | Refills: 5 | Status: SHIPPED | OUTPATIENT
Start: 2018-07-06 | End: 2021-07-22 | Stop reason: HOSPADM

## 2018-07-06 NOTE — PROGRESS NOTES
Assessment:  1  Chronic bilateral low back pain with bilateral sciatica    2  Chronic hand pain, right    3  Chronic myofascial pain    4  Chronic pain disorder    5  Lumbar foraminal stenosis    6  Spondylolisthesis at L5-S1 level    7  Chronic lumbar radiculopathy    8  Osteoarthritis of lumbar spine with myelopathy        Plan:  While the patient was in the office today, I did discuss with the patient at this point time since she is on meloxicam and cyclobenzaprine for her pain, I feel it is quite reasonable to find a primary care provider in her area, which is in the says that he anyway and that they would be comfortable prescribing this medication  The patient was agreeable and verbalized an understanding  I did send her prescriptions for 6 month supplies so that way she had time to find a provider in her area  The patient was very thankful  I discussed with the patient that at this point time she can followup with our office on an as-needed basis  I did review the patient that if her pain symptoms should change, worsen, and/or if she would experience any new symptoms as she would like to be evaluated for, she should give our office a call  The patient was agreeable and verbalized an understanding  History of Present Illness: The patient is a 57 y o  female last seen on 12/14/17 who presents for a follow up office visit in regards to chronic pain secondary to lumbar spondylosis and stenosis  The patient currently reports that in general her stimulator is helping her pain except for when she stands or walks for any length of time, however, she feels that is directly related to the massive amount of weight she has gained over the past several months as she is currently experiencing some lower extremity edema and is wearing compression stockings  She has been following with a cardiologist since her last office visit because she now has atrial fibrillation   She reports that she continues with the meloxicam and cyclobenzaprine because does provide relief, without side effects  The patient reports that she now lives in [place], which is pretty far from this office as she is trying to find a primary care provider closer to her and feels that they could take over prescribing her medication  I have personally reviewed and/or updated the patient's past medical history, past surgical history, family history, social history, current medications, allergies, and vital signs today  Review of Systems:    Review of Systems   Respiratory: Negative for shortness of breath  Cardiovascular: Positive for chest pain  Gastrointestinal: Negative for constipation, diarrhea, nausea and vomiting  Musculoskeletal: Positive for gait problem  Negative for arthralgias, joint swelling (joint stiffness) and myalgias  Skin: Negative for rash  Neurological: Negative for dizziness, seizures and weakness  All other systems reviewed and are negative  Past Medical History:   Diagnosis Date    Cardiac disease     CHF (congestive heart failure) (HCC)     Chronic pain     COPD (chronic obstructive pulmonary disease) (HCC)     GERD (gastroesophageal reflux disease)     Hyperlipidemia     Hypertension        Past Surgical History:   Procedure Laterality Date    CARDIAC PACEMAKER PLACEMENT      CHOLECYSTECTOMY      CORONARY STENT PLACEMENT      GASTRIC BYPASS      HERNIA REPAIR      KNEE ARTHROPLASTY      ID SURG IMPLNT NEUROELECT,EPIDURAL N/A 10/25/2016    Procedure: INSERTION DORSAL COLUMN SPINAL CORD STIMULATOR WITH IPG ( IMPULSE) ; Surgeon: Gayle Louise MD;  Location: [facility];  Service: Orthopedics    WRIST FUSION         No family history on file  Social History     Occupational History    Not on file       Social History Main Topics    Smoking status: Former Smoker    Smokeless tobacco: Not on file    Alcohol use Yes      Comment: social    Drug use: No    Sexual activity: Not on file         Current Outpatient Prescriptions:     acetaminophen (TYLENOL) 325 mg tablet, Take 2 tablets by mouth every 6 (six) hours, Disp: 30 tablet, Rfl: 0    amLODIPine (NORVASC) 5 mg tablet, Take 5 mg by mouth daily  , Disp: , Rfl:     aspirin 81 MG tablet, Take 81 mg by mouth daily  , Disp: , Rfl:     clonazePAM (KlonoPIN) 2 mg tablet, Take 1 mg by mouth daily at bedtime as needed for seizures  , Disp: , Rfl:     cyclobenzaprine (FLEXERIL) 10 mg tablet, Take 1 tablet (10 mg total) by mouth 2 (two) times a day as needed for muscle spasms for up to 40 doses, Disp: 40 tablet, Rfl: 1    docusate sodium (COLACE) 100 mg capsule, Take 1 capsule by mouth 2 (two) times a day for 30 days, Disp: 60 capsule, Rfl: 0    FLUoxetine (PROzac) 20 MG tablet, Take 60 mg by mouth daily  , Disp: , Rfl:     gabapentin (NEURONTIN) 100 mg capsule, Take 1 capsule by mouth 3 (three) times a day for 10 days, Disp: 30 capsule, Rfl: 0    isosorbide mononitrate (IMDUR) 30 mg 24 hr tablet, Take 30 mg by mouth 3 (three) times a day , Disp: , Rfl:     meloxicam (MOBIC) 7 5 mg tablet, Take 1 PO BID with food  NO OTHER NSAIDS, Disp: 60 tablet, Rfl: 1    metoprolol tartrate (LOPRESSOR) 25 mg tablet, Take 75 mg by mouth daily  , Disp: , Rfl:     omeprazole (PriLOSEC) 20 mg delayed release capsule, Take 20 mg by mouth daily  , Disp: , Rfl:     simvastatin (ZOCOR) 20 mg tablet, Take 20 mg by mouth daily at bedtime  , Disp: , Rfl:     traZODone (DESYREL) 100 mg tablet, Take 100 mg by mouth 2 (two) times a day , Disp: , Rfl:     Allergies   Allergen Reactions    Latex        Physical Exam:    There were no vitals taken for this visit  Constitutional:normal, well developed, well nourished, alert, in no distress and non-toxic and no overt pain behavior   and obese  Eyes:anicteric  HEENT:grossly intact  Neck:supple, symmetric, trachea midline and no masses   Pulmonary:even and unlabored  Cardiovascular:No edema or pitting edema present  Skin:Normal without rashes or lesions and well hydrated  Psychiatric:Mood and affect appropriate  Neurologic:Cranial Nerves II-XII grossly intact  Musculoskeletal:Slightly antalgic, but steady gait with the use of a single cane  Imaging  No orders to display         No orders of the defined types were placed in this encounter

## 2019-02-06 DIAGNOSIS — G89.29 CHRONIC MYOFASCIAL PAIN: ICD-10-CM

## 2019-02-06 DIAGNOSIS — M79.18 CHRONIC MYOFASCIAL PAIN: ICD-10-CM

## 2019-02-06 RX ORDER — CYCLOBENZAPRINE HCL 10 MG
TABLET ORAL
Qty: 60 TABLET | Refills: 5 | OUTPATIENT
Start: 2019-02-06

## 2019-02-20 DIAGNOSIS — M79.18 CHRONIC MYOFASCIAL PAIN: ICD-10-CM

## 2019-02-20 DIAGNOSIS — M43.17 SPONDYLOLISTHESIS AT L5-S1 LEVEL: ICD-10-CM

## 2019-02-20 DIAGNOSIS — M54.41 CHRONIC BILATERAL LOW BACK PAIN WITH BILATERAL SCIATICA: ICD-10-CM

## 2019-02-20 DIAGNOSIS — M54.42 CHRONIC BILATERAL LOW BACK PAIN WITH BILATERAL SCIATICA: ICD-10-CM

## 2019-02-20 DIAGNOSIS — G89.29 CHRONIC MYOFASCIAL PAIN: ICD-10-CM

## 2019-02-20 DIAGNOSIS — G89.29 CHRONIC HAND PAIN, RIGHT: ICD-10-CM

## 2019-02-20 DIAGNOSIS — G89.29 CHRONIC BILATERAL LOW BACK PAIN WITH BILATERAL SCIATICA: ICD-10-CM

## 2019-02-20 DIAGNOSIS — M79.641 CHRONIC HAND PAIN, RIGHT: ICD-10-CM

## 2019-02-20 RX ORDER — MELOXICAM 7.5 MG/1
TABLET ORAL
Qty: 60 TABLET | Refills: 5 | OUTPATIENT
Start: 2019-02-20

## 2021-05-30 ENCOUNTER — HOSPITAL ENCOUNTER (INPATIENT)
Facility: HOSPITAL | Age: 66
End: 2021-05-30
Attending: PSYCHIATRY & NEUROLOGY | Admitting: PSYCHIATRY & NEUROLOGY
Payer: COMMERCIAL

## 2021-06-13 ENCOUNTER — HOSPITAL ENCOUNTER (EMERGENCY)
Facility: HOSPITAL | Age: 66
End: 2021-06-15
Attending: EMERGENCY MEDICINE | Admitting: EMERGENCY MEDICINE
Payer: COMMERCIAL

## 2021-06-13 ENCOUNTER — APPOINTMENT (EMERGENCY)
Dept: RADIOLOGY | Facility: HOSPITAL | Age: 66
End: 2021-06-13
Payer: COMMERCIAL

## 2021-06-13 DIAGNOSIS — G89.29 CHRONIC LOW BACK PAIN: ICD-10-CM

## 2021-06-13 DIAGNOSIS — Z96.89 SPINAL CORD STIMULATOR STATUS: ICD-10-CM

## 2021-06-13 DIAGNOSIS — I10 ESSENTIAL HYPERTENSION: ICD-10-CM

## 2021-06-13 DIAGNOSIS — M54.50 CHRONIC LOW BACK PAIN: ICD-10-CM

## 2021-06-13 DIAGNOSIS — R44.1 VISUAL HALLUCINATION: Primary | ICD-10-CM

## 2021-06-13 LAB
ALBUMIN SERPL BCP-MCNC: 3.2 G/DL (ref 3.5–5)
ALP SERPL-CCNC: 145 U/L (ref 46–116)
ALT SERPL W P-5'-P-CCNC: 36 U/L (ref 12–78)
AMPHETAMINES SERPL QL SCN: NEGATIVE
ANION GAP SERPL CALCULATED.3IONS-SCNC: 6 MMOL/L (ref 4–13)
AST SERPL W P-5'-P-CCNC: 24 U/L (ref 5–45)
BACTERIA UR QL AUTO: ABNORMAL /HPF
BARBITURATES UR QL: NEGATIVE
BASOPHILS # BLD AUTO: 0.08 THOUSANDS/ΜL (ref 0–0.1)
BASOPHILS NFR BLD AUTO: 1 % (ref 0–1)
BENZODIAZ UR QL: NEGATIVE
BILIRUB SERPL-MCNC: 0.2 MG/DL (ref 0.2–1)
BILIRUB UR QL STRIP: NEGATIVE
BUN SERPL-MCNC: 24 MG/DL (ref 5–25)
CALCIUM ALBUM COR SERPL-MCNC: 9.6 MG/DL (ref 8.3–10.1)
CALCIUM SERPL-MCNC: 9 MG/DL (ref 8.3–10.1)
CHLORIDE SERPL-SCNC: 110 MMOL/L (ref 100–108)
CLARITY UR: CLEAR
CO2 SERPL-SCNC: 31 MMOL/L (ref 21–32)
COCAINE UR QL: NEGATIVE
COLOR UR: YELLOW
CREAT SERPL-MCNC: 1.1 MG/DL (ref 0.6–1.3)
EOSINOPHIL # BLD AUTO: 0.49 THOUSAND/ΜL (ref 0–0.61)
EOSINOPHIL NFR BLD AUTO: 5 % (ref 0–6)
ERYTHROCYTE [DISTWIDTH] IN BLOOD BY AUTOMATED COUNT: 13.5 % (ref 11.6–15.1)
ETHANOL EXG-MCNC: 0 MG/DL
GFR SERPL CREATININE-BSD FRML MDRD: 52 ML/MIN/1.73SQ M
GLUCOSE SERPL-MCNC: 77 MG/DL (ref 65–140)
GLUCOSE UR STRIP-MCNC: NEGATIVE MG/DL
HCT VFR BLD AUTO: 38.1 % (ref 34.8–46.1)
HGB BLD-MCNC: 11.7 G/DL (ref 11.5–15.4)
HGB UR QL STRIP.AUTO: ABNORMAL
IMM GRANULOCYTES # BLD AUTO: 0.03 THOUSAND/UL (ref 0–0.2)
IMM GRANULOCYTES NFR BLD AUTO: 0 % (ref 0–2)
KETONES UR STRIP-MCNC: NEGATIVE MG/DL
LEUKOCYTE ESTERASE UR QL STRIP: ABNORMAL
LYMPHOCYTES # BLD AUTO: 2.1 THOUSANDS/ΜL (ref 0.6–4.47)
LYMPHOCYTES NFR BLD AUTO: 19 % (ref 14–44)
MCH RBC QN AUTO: 29.9 PG (ref 26.8–34.3)
MCHC RBC AUTO-ENTMCNC: 30.7 G/DL (ref 31.4–37.4)
MCV RBC AUTO: 97 FL (ref 82–98)
METHADONE UR QL: NEGATIVE
MONOCYTES # BLD AUTO: 1.1 THOUSAND/ΜL (ref 0.17–1.22)
MONOCYTES NFR BLD AUTO: 10 % (ref 4–12)
MUCOUS THREADS UR QL AUTO: ABNORMAL
NEUTROPHILS # BLD AUTO: 7.13 THOUSANDS/ΜL (ref 1.85–7.62)
NEUTS SEG NFR BLD AUTO: 65 % (ref 43–75)
NITRITE UR QL STRIP: NEGATIVE
NON-SQ EPI CELLS URNS QL MICRO: ABNORMAL /HPF
NRBC BLD AUTO-RTO: 0 /100 WBCS
OPIATES UR QL SCN: POSITIVE
OTHER STN SPEC: ABNORMAL
OXYCODONE+OXYMORPHONE UR QL SCN: NEGATIVE
PCP UR QL: NEGATIVE
PH UR STRIP.AUTO: 6 [PH]
PLATELET # BLD AUTO: 283 THOUSANDS/UL (ref 149–390)
PMV BLD AUTO: 10.4 FL (ref 8.9–12.7)
POTASSIUM SERPL-SCNC: 5.1 MMOL/L (ref 3.5–5.3)
PROT SERPL-MCNC: 6.7 G/DL (ref 6.4–8.2)
PROT UR STRIP-MCNC: NEGATIVE MG/DL
RBC # BLD AUTO: 3.91 MILLION/UL (ref 3.81–5.12)
RBC #/AREA URNS AUTO: ABNORMAL /HPF
SARS-COV-2 RNA RESP QL NAA+PROBE: NEGATIVE
SODIUM SERPL-SCNC: 147 MMOL/L (ref 136–145)
SP GR UR STRIP.AUTO: 1.02 (ref 1–1.03)
THC UR QL: NEGATIVE
TSH SERPL DL<=0.05 MIU/L-ACNC: 1.84 UIU/ML (ref 0.36–3.74)
UROBILINOGEN UR QL STRIP.AUTO: 0.2 E.U./DL
WBC # BLD AUTO: 10.93 THOUSAND/UL (ref 4.31–10.16)
WBC #/AREA URNS AUTO: ABNORMAL /HPF

## 2021-06-13 PROCEDURE — 87086 URINE CULTURE/COLONY COUNT: CPT | Performed by: PHYSICIAN ASSISTANT

## 2021-06-13 PROCEDURE — U0005 INFEC AGEN DETEC AMPLI PROBE: HCPCS | Performed by: PHYSICIAN ASSISTANT

## 2021-06-13 PROCEDURE — 80307 DRUG TEST PRSMV CHEM ANLYZR: CPT | Performed by: PHYSICIAN ASSISTANT

## 2021-06-13 PROCEDURE — 82075 ASSAY OF BREATH ETHANOL: CPT | Performed by: PHYSICIAN ASSISTANT

## 2021-06-13 PROCEDURE — 84443 ASSAY THYROID STIM HORMONE: CPT | Performed by: PHYSICIAN ASSISTANT

## 2021-06-13 PROCEDURE — U0003 INFECTIOUS AGENT DETECTION BY NUCLEIC ACID (DNA OR RNA); SEVERE ACUTE RESPIRATORY SYNDROME CORONAVIRUS 2 (SARS-COV-2) (CORONAVIRUS DISEASE [COVID-19]), AMPLIFIED PROBE TECHNIQUE, MAKING USE OF HIGH THROUGHPUT TECHNOLOGIES AS DESCRIBED BY CMS-2020-01-R: HCPCS | Performed by: PHYSICIAN ASSISTANT

## 2021-06-13 PROCEDURE — 36415 COLL VENOUS BLD VENIPUNCTURE: CPT | Performed by: PHYSICIAN ASSISTANT

## 2021-06-13 PROCEDURE — 99284 EMERGENCY DEPT VISIT MOD MDM: CPT | Performed by: PHYSICIAN ASSISTANT

## 2021-06-13 PROCEDURE — 81001 URINALYSIS AUTO W/SCOPE: CPT | Performed by: PHYSICIAN ASSISTANT

## 2021-06-13 PROCEDURE — 87186 SC STD MICRODIL/AGAR DIL: CPT | Performed by: PHYSICIAN ASSISTANT

## 2021-06-13 PROCEDURE — 85025 COMPLETE CBC W/AUTO DIFF WBC: CPT | Performed by: PHYSICIAN ASSISTANT

## 2021-06-13 PROCEDURE — 87077 CULTURE AEROBIC IDENTIFY: CPT | Performed by: PHYSICIAN ASSISTANT

## 2021-06-13 PROCEDURE — 80053 COMPREHEN METABOLIC PANEL: CPT | Performed by: PHYSICIAN ASSISTANT

## 2021-06-13 PROCEDURE — 99285 EMERGENCY DEPT VISIT HI MDM: CPT

## 2021-06-13 RX ORDER — AMLODIPINE BESYLATE 5 MG/1
5 TABLET ORAL DAILY
Status: DISCONTINUED | OUTPATIENT
Start: 2021-06-14 | End: 2021-06-15 | Stop reason: HOSPADM

## 2021-06-13 RX ORDER — PANTOPRAZOLE SODIUM 40 MG/1
40 TABLET, DELAYED RELEASE ORAL DAILY
Status: DISCONTINUED | OUTPATIENT
Start: 2021-06-14 | End: 2021-06-15 | Stop reason: HOSPADM

## 2021-06-13 RX ORDER — KETOROLAC TROMETHAMINE 30 MG/ML
15 INJECTION, SOLUTION INTRAMUSCULAR; INTRAVENOUS ONCE
Status: DISCONTINUED | OUTPATIENT
Start: 2021-06-13 | End: 2021-06-13

## 2021-06-13 RX ORDER — VENLAFAXINE HYDROCHLORIDE 150 MG/1
150 CAPSULE, EXTENDED RELEASE ORAL DAILY
Status: ON HOLD | COMMUNITY
End: 2021-07-21 | Stop reason: SDUPTHER

## 2021-06-13 RX ORDER — ISOSORBIDE MONONITRATE 30 MG/1
30 TABLET, EXTENDED RELEASE ORAL DAILY
Status: DISCONTINUED | OUTPATIENT
Start: 2021-06-14 | End: 2021-06-15 | Stop reason: HOSPADM

## 2021-06-13 RX ORDER — GABAPENTIN 100 MG/1
200 CAPSULE ORAL 4 TIMES DAILY
Status: DISCONTINUED | OUTPATIENT
Start: 2021-06-13 | End: 2021-06-15 | Stop reason: HOSPADM

## 2021-06-13 RX ORDER — ACETAMINOPHEN 325 MG/1
650 TABLET ORAL ONCE
Status: COMPLETED | OUTPATIENT
Start: 2021-06-13 | End: 2021-06-13

## 2021-06-13 RX ORDER — GABAPENTIN 100 MG/1
200 CAPSULE ORAL 4 TIMES DAILY
COMMUNITY
End: 2021-07-22 | Stop reason: HOSPADM

## 2021-06-13 RX ORDER — OLANZAPINE 2.5 MG/1
10 TABLET ORAL DAILY
Status: DISCONTINUED | OUTPATIENT
Start: 2021-06-14 | End: 2021-06-15 | Stop reason: HOSPADM

## 2021-06-13 RX ORDER — VENLAFAXINE HYDROCHLORIDE 150 MG/1
150 CAPSULE, EXTENDED RELEASE ORAL DAILY
Status: DISCONTINUED | OUTPATIENT
Start: 2021-06-14 | End: 2021-06-15 | Stop reason: HOSPADM

## 2021-06-13 RX ORDER — BUPROPION HYDROCHLORIDE 300 MG/1
300 TABLET ORAL DAILY
Status: DISCONTINUED | OUTPATIENT
Start: 2021-06-14 | End: 2021-06-15 | Stop reason: HOSPADM

## 2021-06-13 RX ORDER — PRAVASTATIN SODIUM 20 MG
20 TABLET ORAL
Status: DISCONTINUED | OUTPATIENT
Start: 2021-06-13 | End: 2021-06-15 | Stop reason: HOSPADM

## 2021-06-13 RX ORDER — BUPROPION HYDROCHLORIDE 300 MG/1
300 TABLET ORAL DAILY
COMMUNITY
End: 2021-07-22 | Stop reason: HOSPADM

## 2021-06-13 RX ORDER — CEPHALEXIN 250 MG/1
500 CAPSULE ORAL ONCE
Status: COMPLETED | OUTPATIENT
Start: 2021-06-13 | End: 2021-06-13

## 2021-06-13 RX ORDER — OLANZAPINE 10 MG/1
10 TABLET ORAL
Status: ON HOLD | COMMUNITY
End: 2021-07-21 | Stop reason: SDUPTHER

## 2021-06-13 RX ADMIN — GABAPENTIN 200 MG: 100 CAPSULE ORAL at 22:43

## 2021-06-13 RX ADMIN — PRAVASTATIN SODIUM 20 MG: 20 TABLET ORAL at 19:38

## 2021-06-13 RX ADMIN — ACETAMINOPHEN 650 MG: 325 TABLET, FILM COATED ORAL at 23:03

## 2021-06-13 RX ADMIN — METOPROLOL TARTRATE 25 MG: 25 TABLET, FILM COATED ORAL at 22:43

## 2021-06-13 RX ADMIN — APIXABAN 5 MG: 5 TABLET, FILM COATED ORAL at 22:43

## 2021-06-13 RX ADMIN — CEPHALEXIN 500 MG: 250 CAPSULE ORAL at 14:11

## 2021-06-13 RX ADMIN — GABAPENTIN 200 MG: 100 CAPSULE ORAL at 19:37

## 2021-06-13 NOTE — ED NOTES
Daughter Scarlett left with patient clothing and footwear        Maryann Waters, COCO  06/13/21 0410

## 2021-06-13 NOTE — ED NOTES
[facility]'s-units are full  [place] - per [place], full, no beds  [place] - no beds  [facility] - Per [employer], no adult beds   [facility], no return call  [place] - No acute beds  [facility] - No acute beds  [facility]- Per [place], no beds  Can re-present again tomorrow morning     [facility] - Per [facility], full, no beds   [place] - Hever Sun, no acute beds  [facility] -Per Patience, full today  [place] - no beds  [facility] - no beds

## 2021-06-13 NOTE — ED PROVIDER NOTES
History  Chief Complaint   Patient presents with    Psychiatric Evaluation     pt was released on weds from [facility] for psychatric help  pt is having nightmares, hallucinations, and talking to people who are not there  pt says she is not SI/HI but is very scared and needs help      77 old female with history COPD, CHF, hypertension and hyperlipidemia presents to the emergency department for evaluation increasing auditory hallucinations  States that she was recently admitted at [facility] for similar,  However her symptoms have recurred shortly after discharge  Currently she denies any suicidal or homicidal ideation however states that she does not feel safe at home with her current thoughts  She lives alone however is currently staying with her daughter for safety  Denies any command hallucinations, denies illicit substance use or alcohol use, denies auditory hallucinations  States she is also having increased demonic nightmares which are concerning for safety  she is hopeful to sign a 201          Prior to Admission Medications   Prescriptions Last Dose Informant Patient Reported? Taking? Lidocaine HCl (DR Stacey Montano MAGIC MOUTH WASH)   Yes No   Sig: SWISH AND SPIT WITH 2 TEASPOONSFUL 3 TIMES DAILY   OLANZapine (ZyPREXA) 10 mg tablet   Yes Yes   Sig: Take 10 mg by mouth daily at bedtime   acetaminophen (TYLENOL) 325 mg tablet   No No   Sig: Take 2 tablets by mouth every 6 (six) hours   amLODIPine (NORVASC) 5 mg tablet   Yes No   Sig: Take 5 mg by mouth daily  aspirin 81 MG tablet   Yes No   Sig: Take 81 mg by mouth daily  buPROPion (WELLBUTRIN XL) 300 mg 24 hr tablet   Yes Yes   Sig: Take 300 mg by mouth daily   clonazePAM (KlonoPIN) 2 mg tablet   Yes No   Sig: Take 1 mg by mouth daily at bedtime as needed for seizures     cyclobenzaprine (FLEXERIL) 10 mg tablet   No No   Sig: Take 1 PO BID   gabapentin (NEURONTIN) 100 mg capsule   Yes Yes   Sig: Take 200 mg by mouth 4 (four) times a day   isosorbide mononitrate (IMDUR) 30 mg 24 hr tablet   Yes No   Sig: Take 30 mg by mouth 3 (three) times a day  meloxicam (MOBIC) 7 5 mg tablet   No No   Sig: Take 1 PO BID with food  NO OTHER NSAIDS   metoprolol tartrate (LOPRESSOR) 25 mg tablet   Yes No   Sig: Take 75 mg by mouth daily  pantoprazole (PROTONIX) 40 mg tablet   Yes No   Sig: Take 40 mg by mouth daily   simvastatin (ZOCOR) 20 mg tablet   Yes No   Sig: Take 20 mg by mouth daily at bedtime  venlafaxine (EFFEXOR-XR) 150 mg 24 hr capsule   Yes Yes   Sig: Take 150 mg by mouth daily      Facility-Administered Medications: None       Past Medical History:   Diagnosis Date    Cardiac disease     CHF (congestive heart failure) (HCC)     Chronic pain     COPD (chronic obstructive pulmonary disease) (HCC)     GERD (gastroesophageal reflux disease)     Hyperlipidemia     Hypertension        Past Surgical History:   Procedure Laterality Date    CARDIAC PACEMAKER PLACEMENT      CHOLECYSTECTOMY      CORONARY STENT PLACEMENT      GASTRIC BYPASS      HERNIA REPAIR      KNEE ARTHROPLASTY      DC SURG IMPLNT NEUROELECT,EPIDURAL N/A 10/25/2016    Procedure: INSERTION DORSAL COLUMN SPINAL CORD STIMULATOR WITH IPG ( IMPULSE) ; Surgeon: Sierra Benitez MD;  [facility];  Service: Orthopedics    WRIST FUSION         History reviewed  No pertinent family history  I have reviewed and agree with the history as documented  E-Cigarette/Vaping     E-Cigarette/Vaping Substances     Social History     Tobacco Use    Smoking status: Former Smoker    Smokeless tobacco: Never Used   Substance Use Topics    Alcohol use: Yes     Comment: social    Drug use: No       Review of Systems   Constitutional: Negative for chills, diaphoresis and fever  Eyes: Negative for visual disturbance  Respiratory: Negative for cough and shortness of breath  Cardiovascular: Negative for chest pain and palpitations     Gastrointestinal: Negative for abdominal pain, diarrhea, nausea and vomiting  Genitourinary: Negative for dysuria, flank pain and frequency  Musculoskeletal: Negative for arthralgias and myalgias  Skin: Negative for color change, rash and wound  Allergic/Immunologic: Negative for immunocompromised state  Neurological: Negative for dizziness and light-headedness  Hematological: Does not bruise/bleed easily  Psychiatric/Behavioral: Positive for dysphoric mood, hallucinations and sleep disturbance  Negative for confusion, self-injury and suicidal ideas  The patient is nervous/anxious  Physical Exam  Physical Exam  Vitals reviewed  Constitutional:       General: She is not in acute distress  Appearance: She is well-developed  She is not diaphoretic  HENT:      Head: Normocephalic and atraumatic  Mouth/Throat:      Mouth: Mucous membranes are moist    Eyes:      General: No scleral icterus  Pupils: Pupils are equal, round, and reactive to light  Neck:      Vascular: No JVD  Cardiovascular:      Rate and Rhythm: Normal rate and regular rhythm  Heart sounds: No murmur heard  No friction rub  No gallop  Pulmonary:      Effort: No respiratory distress  Breath sounds: No wheezing or rales  Abdominal:      General: Bowel sounds are normal  There is no distension  Palpations: Abdomen is soft  There is no mass  Tenderness: There is no abdominal tenderness  There is no guarding or rebound  Skin:     General: Skin is warm and dry  Capillary Refill: Capillary refill takes less than 2 seconds  Coloration: Skin is not pale  Neurological:      Mental Status: She is alert and oriented to person, place, and time     Psychiatric:         Mood and Affect: Mood normal          Behavior: Behavior normal          Vital Signs  ED Triage Vitals   Temperature Pulse Respirations Blood Pressure SpO2   06/13/21 1153 06/13/21 1153 06/13/21 1153 06/13/21 1155 06/13/21 1155   97 5 °F (36 4 °C) 97 18 126/81 98 %      Temp src Heart Rate Source Patient Position - Orthostatic VS BP Location FiO2 (%)   -- 06/13/21 1938 06/13/21 1938 06/13/21 1938 --    Monitor Lying Left arm       Pain Score       --                  Vitals:    06/13/21 1153 06/13/21 1155 06/13/21 1938   BP:  126/81 132/66   Pulse: 97  68   Patient Position - Orthostatic VS:   Lying         Visual Acuity      ED Medications  Medications   gabapentin (NEURONTIN) capsule 200 mg (200 mg Oral Given 6/13/21 1937)   buPROPion (WELLBUTRIN XL) 24 hr tablet 300 mg (has no administration in time range)   amLODIPine (NORVASC) tablet 5 mg (has no administration in time range)   isosorbide mononitrate (IMDUR) 24 hr tablet 30 mg (has no administration in time range)   metoprolol tartrate (LOPRESSOR) tablet 25 mg (has no administration in time range)   OLANZapine (ZyPREXA) tablet 10 mg (has no administration in time range)   pantoprazole (PROTONIX) EC tablet 40 mg (has no administration in time range)   venlafaxine (EFFEXOR-XR) 24 hr capsule 150 mg (has no administration in time range)   pravastatin (PRAVACHOL) tablet 20 mg (20 mg Oral Given 6/13/21 1938)   cephalexin (KEFLEX) capsule 500 mg (500 mg Oral Given 6/13/21 1411)       Diagnostic Studies  Results Reviewed     Procedure Component Value Units Date/Time    TSH [[id number]]  (Normal) Collected: 06/13/21 1304    Lab Status: Final result Specimen: Blood from Arm, Right Updated: 06/13/21 1411     TSH 3RD GENERATON 1 838 uIU/mL     Narrative:      Patients undergoing fluorescein dye angiography may retain small amounts of fluorescein in the body for 48-72 hours post procedure  Samples containing fluorescein can produce falsely depressed TSH values  If the patient had this procedure,a specimen should be resubmitted post fluorescein clearance        Novel Coronavirus [facility] [[id number]]  (Normal) Collected: 06/13/21 1222    Lab Status: Final result Specimen: Nares from Nose Updated: 06/13/21 1342     SARS-CoV-2 Negative    Narrative: The specimen collection materials, transport medium, and/or testing methodology utilized in the production of these test results have been proven to be reliable in a limited validation with an abbreviated program under the Emergency Utilization Authorization provided by the FDA  Testing reported as "Presumptive positive" will be confirmed with secondary testing to ensure result accuracy  Clinical caution and judgement should be used with the interpretation of these results with consideration of the clinical impression and other laboratory testing  Testing reported as "Positive" or "Negative" has been proven to be accurate according to standard laboratory validation requirements  All testing is performed with control materials showing appropriate reactivity at standard intervals        Comprehensive metabolic panel [[id number]]  (Abnormal) Collected: 06/13/21 1304    Lab Status: Final result Specimen: Blood from Arm, Right Updated: 06/13/21 1331     Sodium 147 mmol/L      Potassium 5 1 mmol/L      Chloride 110 mmol/L      CO2 31 mmol/L      ANION GAP 6 mmol/L      BUN 24 mg/dL      Creatinine 1 10 mg/dL      Glucose 77 mg/dL      Calcium 9 0 mg/dL      Corrected Calcium 9 6 mg/dL      AST 24 U/L      ALT 36 U/L      Alkaline Phosphatase 145 U/L      Total Protein 6 7 g/dL      Albumin 3 2 g/dL      Total Bilirubin 0 20 mg/dL      eGFR 52 ml/min/1 73sq m     Narrative:      [place] guidelines for Chronic Kidney Disease (CKD):     Stage 1 with normal or high GFR (GFR > 90 mL/min/1 73 square meters)    Stage 2 Mild CKD (GFR = 60-89 mL/min/1 73 square meters)    Stage 3A Moderate CKD (GFR = 45-59 mL/min/1 73 square meters)    Stage 3B Moderate CKD (GFR = 30-44 mL/min/1 73 square meters)    Stage 4 Severe CKD (GFR = 15-29 mL/min/1 73 square meters)    Stage 5 End Stage CKD (GFR <15 mL/min/1 73 square meters)  Note: GFR calculation is accurate only with a steady state creatinine    CBC and differential [[id number]]  (Abnormal) Collected: 06/13/21 1304    Lab Status: Final result Specimen: Blood from Arm, Right Updated: 06/13/21 1310     WBC 10 93 Thousand/uL      RBC 3 91 Million/uL      Hemoglobin 11 7 g/dL      Hematocrit 38 1 %      MCV 97 fL      MCH 29 9 pg      MCHC 30 7 g/dL      RDW 13 5 %      MPV 10 4 fL      Platelets 362 Thousands/uL      nRBC 0 /100 WBCs      Neutrophils Relative 65 %      Immat GRANS % 0 %      Lymphocytes Relative 19 %      Monocytes Relative 10 %      Eosinophils Relative 5 %      Basophils Relative 1 %      Neutrophils Absolute 7 13 Thousands/µL      Immature Grans Absolute 0 03 Thousand/uL      Lymphocytes Absolute 2 10 Thousands/µL      Monocytes Absolute 1 10 Thousand/µL      Eosinophils Absolute 0 49 Thousand/µL      Basophils Absolute 0 08 Thousands/µL     Urine Microscopic [[id number]]  (Abnormal) Collected: 06/13/21 1230    Lab Status: Final result Specimen: Urine, Clean Catch Updated: 06/13/21 1310     RBC, UA 2-4 /hpf      WBC, UA 30-50 /hpf      Epithelial Cells Occasional /hpf      Bacteria, UA Occasional /hpf      OTHER OBSERVATIONS WBCs Clumped     MUCUS THREADS Occasional    Urine culture [[id number]] Collected: 06/13/21 1230    Lab Status: In process Specimen: Urine, Clean Catch Updated: 06/13/21 1310    Rapid drug screen, urine [[id number]]  (Abnormal) Collected: 06/13/21 1230    Lab Status: Final result Specimen: Urine, Clean Catch Updated: 06/13/21 1303     Amph/Meth UR Negative     Barbiturate Ur Negative     Benzodiazepine Urine Negative     Cocaine Urine Negative     Methadone Urine Negative     Opiate Urine Positive     PCP Ur Negative     THC Urine Negative     Oxycodone Urine Negative    Narrative:      Presumptive report  If requested, specimen will be sent to reference lab for confirmation  FOR MEDICAL PURPOSES ONLY  IF CONFIRMATION NEEDED PLEASE CONTACT THE LAB WITHIN 5 DAYS      Drug Screen Cutoff Levels:  AMPHETAMINE/METHAMPHETAMINES  1000 ng/mL  BARBITURATES     200 ng/mL  BENZODIAZEPINES     200 ng/mL  COCAINE      300 ng/mL  METHADONE      300 ng/mL  OPIATES      300 ng/mL  PHENCYCLIDINE     25 ng/mL  THC       50 ng/mL  OXYCODONE      100 ng/mL    UA w Reflex to Microscopic w Reflex to Culture [[id number]]  (Abnormal) Collected: 06/13/21 1230    Lab Status: Final result Specimen: Urine, Clean Catch Updated: 06/13/21 1253     Color, UA Yellow     Clarity, UA Clear     Specific [place], UA 1 020     pH, UA 6 0     Leukocytes, UA Moderate     Nitrite, UA Negative     Protein, UA Negative mg/dl      Glucose, UA Negative mg/dl      Ketones, UA Negative mg/dl      Urobilinogen, UA 0 2 E U /dl      Bilirubin, UA Negative     Blood, UA Trace-lysed    POCT alcohol breath test [[id number]]  (Normal) Resulted: 06/13/21 1245    Lab Status: Final result Updated: 06/13/21 1245     EXTBreath Alcohol 0 000                 No orders to display              Procedures  Procedures         ED Course  ED Course as of Jun 13 1941   Sun Jun 13, 2021   1339 Pt is medically cleared for crisis      1545 201 signed and faxed/returned to crisis      1601 Effexor xr 150mg  Olanzapine 10mg  Wellbutrin xl 300mg   Gabapentin 200mg qid  Eliquis      1641 Pt is able to contract for safety  She is not actively suicidal  Appropriate for q15 minute safety checks, does not require a 1:1                                              MDM  Number of Diagnoses or Management Options  Visual hallucination: new and requires workup  Diagnosis management comments: 201 signed, pt awaiting placement   Signed out pending placement       Amount and/or Complexity of Data Reviewed  Clinical lab tests: reviewed and ordered  Tests in the medicine section of CPT®: reviewed and ordered  Review and summarize past medical records: yes  Discuss the patient with other providers: yes  Independent visualization of images, tracings, or specimens: yes        Disposition  Final diagnoses:   Visual hallucination     Time reflects when diagnosis was documented in both MDM as applicable and the Disposition within this note     Time User Action Codes Description Comment    6/13/2021  2:02 PM Rebecca Aggarwal Add [R44 1] Visual hallucination       ED Disposition     ED Disposition Condition Date/Time Comment    Transfer to [facility] Jun 13, 2021  2:01 PM Jacques Ennis should be transferred out to (location pending) and has been medically cleared  MD Documentation      Most Recent Value   Sending MD  Dr Joanna Albarran    None         Patient's Medications   Discharge Prescriptions    No medications on file     No discharge procedures on file      PDMP Review     None          ED Provider  Electronically Signed by           Garima Licea PA-C  06/13/21 1942

## 2021-06-13 NOTE — ED NOTES
Crisis worker spoke with patient by phone which she was agreeable with  Patient is a 77year-old F with hx of anxiety who self-presents to the ED with c/o nightmares, visual hallucinations, increased anxiety and depression, hopelessness, and passive suicidal ideation  Patient is tearful and admits to life and social stressors  Patient reports that she started having nightmares and "seeing people" x1 year ago  Reports she was evicted from her apartment in March 2021 and "lost everything "  She also has lost a lot of close family and friends over the past year, she states "God has taken eveything " Reports she was hospitalized inpatient at [facility] for 10 days, d'c'd 5 days ago, where she had her medication adjusted  Last night patient reports to waking screaming after dreaming about demon in a long robe that was running after her  She is fearful of this demon, but rationalizes that it was just a dream  States she cleaned her granddaughters bedroom just to ovoid going back to sleep  Admits to passive suicidal ideation  She states "My best friend, Shayy Moy is terminally ill  She is now going and I have thoughts of going, too "  Denies any inent or plan  No prior attempts, admits to prior thoughts x1 month  She also states "nothing else matters anymore "  Admits to visual hallucinations of people, mostly when she is in her bedroom  Admits to poor concentration/focus  Denies any eating issues  Her sleeping is poor  Does not report any homicidal ideations or auditory hallucinations  Denies drug use  Reports 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  Does not report any prior trauma, abuse or neglect, however she discusses how she is one of ten siblings and was given responsibility to care for her siblings  No outpatient psychiatry care  Living with her daughter currently  States they are looking for an assisted living residence  Patient is seeking to sign herself in for hospitalization  Rights were read   Patient verbalized understanding  201 was prepared and faxed to [facility]  Chief Complaint   Patient presents with    Psychiatric Evaluation     pt was released on weds from [facility] for psychatric help  pt is having nightmares, hallucinations, and talking to people who are not there   pt says she is not SI/HI but is very scared and needs help       Crisis intake assessment completed, safety risk assessment completed

## 2021-06-14 ENCOUNTER — APPOINTMENT (EMERGENCY)
Dept: RADIOLOGY | Facility: HOSPITAL | Age: 66
End: 2021-06-14
Attending: EMERGENCY MEDICINE
Payer: COMMERCIAL

## 2021-06-14 ENCOUNTER — APPOINTMENT (EMERGENCY)
Dept: CT IMAGING | Facility: HOSPITAL | Age: 66
End: 2021-06-14
Payer: COMMERCIAL

## 2021-06-14 PROCEDURE — 71046 X-RAY EXAM CHEST 2 VIEWS: CPT

## 2021-06-14 PROCEDURE — 70450 CT HEAD/BRAIN W/O DYE: CPT

## 2021-06-14 PROCEDURE — G1004 CDSM NDSC: HCPCS

## 2021-06-14 RX ORDER — CEPHALEXIN 250 MG/1
500 CAPSULE ORAL EVERY 12 HOURS SCHEDULED
Status: DISCONTINUED | OUTPATIENT
Start: 2021-06-14 | End: 2021-06-15 | Stop reason: HOSPADM

## 2021-06-14 RX ORDER — LORAZEPAM 0.5 MG/1
0.5 TABLET ORAL ONCE
Status: COMPLETED | OUTPATIENT
Start: 2021-06-14 | End: 2021-06-14

## 2021-06-14 RX ADMIN — LORAZEPAM 0.5 MG: 0.5 TABLET ORAL at 17:37

## 2021-06-14 RX ADMIN — CEPHALEXIN 500 MG: 250 CAPSULE ORAL at 21:23

## 2021-06-14 RX ADMIN — GABAPENTIN 200 MG: 100 CAPSULE ORAL at 13:10

## 2021-06-14 RX ADMIN — GABAPENTIN 200 MG: 100 CAPSULE ORAL at 09:31

## 2021-06-14 RX ADMIN — METOPROLOL TARTRATE 25 MG: 25 TABLET, FILM COATED ORAL at 09:31

## 2021-06-14 RX ADMIN — METOPROLOL TARTRATE 25 MG: 25 TABLET, FILM COATED ORAL at 21:22

## 2021-06-14 RX ADMIN — CEPHALEXIN 500 MG: 250 CAPSULE ORAL at 13:10

## 2021-06-14 RX ADMIN — BUPROPION HYDROCHLORIDE 300 MG: 300 TABLET, FILM COATED, EXTENDED RELEASE ORAL at 09:32

## 2021-06-14 RX ADMIN — PRAVASTATIN SODIUM 20 MG: 20 TABLET ORAL at 21:22

## 2021-06-14 RX ADMIN — ISOSORBIDE MONONITRATE 30 MG: 30 TABLET, EXTENDED RELEASE ORAL at 09:32

## 2021-06-14 RX ADMIN — GABAPENTIN 200 MG: 100 CAPSULE ORAL at 21:22

## 2021-06-14 RX ADMIN — VENLAFAXINE HYDROCHLORIDE 150 MG: 150 CAPSULE, EXTENDED RELEASE ORAL at 09:32

## 2021-06-14 RX ADMIN — APIXABAN 5 MG: 5 TABLET, FILM COATED ORAL at 17:37

## 2021-06-14 RX ADMIN — OLANZAPINE 10 MG: 2.5 TABLET, FILM COATED ORAL at 09:31

## 2021-06-14 RX ADMIN — APIXABAN 5 MG: 5 TABLET, FILM COATED ORAL at 09:31

## 2021-06-14 RX ADMIN — PANTOPRAZOLE SODIUM 40 MG: 40 TABLET, DELAYED RELEASE ORAL at 09:31

## 2021-06-14 RX ADMIN — GABAPENTIN 200 MG: 100 CAPSULE ORAL at 17:37

## 2021-06-14 RX ADMIN — AMLODIPINE BESYLATE 5 MG: 5 TABLET ORAL at 09:31

## 2021-06-14 NOTE — ED NOTES
Crisis Worker called [facility]  Per Lyman Cranker, they have beds  Records were faxed to [phone]  Per Intake, no confirmation yet on older adult bed availability

## 2021-06-14 NOTE — ED NOTES
While Crisis Worker waited on hold to be connected to an [facility] representative, [facility] from [facility] called an alternate line and indicated, "the physician asked me to put a hold on this referral  the more we look at it, the more concerns we have about our ability to accommodate her"  Dr Tasha Oglesby notified  Bed search exhausted  Resume on next shift  Precert and COB to be done pending acceptance

## 2021-06-14 NOTE — ED NOTES
No beds available at:  [facility]'s units full-Hina Campbell per preadmission screener  [facility] no longer has a rochelle unit and is full- Anitra Garcia is full -[facility] is full-Sanya Roberto is full-[facility] is full-Toy[place] at [email or web address] message-no beds    Patient denied by:   [address]

## 2021-06-14 NOTE — ED NOTES
Per Intake, they will not have an available bed for the patient  Call to [facility] to follow-up  Per Malachi Sierra, they did not receive the referral and their fax machine is out of paper "    and I don't see any paper anywhere"  She was asked to call back if and when they are able to review / accept the patient  Bed search to be initiated

## 2021-06-14 NOTE — ED NOTES
Follow-up on referrals:      Vana Gosselin - referral was faxed to [phone]  Call to inquire on disposition, but there was no answer; outgoing message indicates (still) that they have no bed availability currently; at earliest, referral would be reviewed in the morning  Tamiko East - referral faxed to [employer] earlier  Call placed to [employer] at this time  Spoke with Elena Carlton, who stated that they have no beds available at [facility] currently, but they will retain the referral and call back if a bed becomes available  [facility] - referral faxed to [place] at [phone]  Call to [place] to follow-up and he reported that the physician denied the referral, feeling she did not meet criteria and suggesting that she was more appropriate for [address] [place] [address] - referral faxed to [facility] for review  She is still awaiting diagnostics, but did question the patient's living situation  She reported that the physician indicated that the patient was presented to him last week and she was homeless  Advised her that per chart record, patient was evicted in March, but she is residing with a daughter and can return to the address on the facesheet  Crisis requested that she advise if this concern would interfere with acceptance, as in such a case, the additional diagnostics could be cancelled  She stated, "no, no, don't cancel anything" and assured that she will review the case in entirety once diagnostic results are received  [facility] - referral faxed to [phone] [place] [address], but she explained that they do not accept [address] , but instead refer out to [facility], who is currently reviewing  At present, the only facility considering the patient is [facility] and they are unable to proceed until CXR, EKG, and CT of head results are received  Crisis to follow-up  Results to be faxed to [phone]

## 2021-06-14 NOTE — ED CARE HANDOFF
Emergency Department Sign Out Note        Sign out and transfer of care from [employer]  See Separate Emergency Department note  The patient, Cesar Crooks, was evaluated by the previous provider for nightmares/ hallucinations  ED Course as of Jun 14 0144   Sun Jun 13, 2021 2037 Patient care signed out from [employer]  Patient is a 78 yo F who is here with nightmares and hallucinations  Patient signed 12, pending bed placement  Patient is being treated for a UTI      Mon Jun 14, 2021 0143 Patient care signed out to Dr Card Factor  Procedures  MDM    Disposition  Final diagnoses:   Visual hallucination     Time reflects when diagnosis was documented in both MDM as applicable and the Disposition within this note     Time User Action Codes Description Comment    6/13/2021  2:02 PM Nu Pulse Add [R44 1] Visual hallucination       ED Disposition     ED Disposition Condition Date/Time Comment    Transfer to [facility] Jun 13, 2021  2:01 PM Cesar Crooks should be transferred out to (location pending) and has been medically cleared  MD Documentation      Most Recent Value   Sending MD  Dr Tao Rg    None       Patient's Medications   Discharge Prescriptions    No medications on file     No discharge procedures on file         ED Provider  Electronically Signed by     Leonid Paredes DO  06/14/21 0144

## 2021-06-14 NOTE — ED NOTES
[facility]- Per Jasvir Pena, no beds  [facility]- Per Rosaura, no appropriate beds  [place]- Per Bambi Sharp, no appropriate beds  [facility]- Per Alessandra Parekh, no beds   [facility]- Per Rodo chowdhury, no beds   [facility]- Per Amna Bullard, no beds  [place]- Per Rosina Lua, no beds   [facility]  [place] Per [place], no beds  [facility]- Admissions are on in morning to call   [facility] Kehr- can not reach admissions  [facility]- Per Rebecca, can review, chart faxed at this time         Bed Search exhausted at this time, crisis to follow up in the morning

## 2021-06-14 NOTE — ED NOTES
Confirmed with Intake that they have patient's referral and they will be in touch once they confirm bed availability

## 2021-06-14 NOTE — ED NOTES
Insurance cards faxed to [facility]  Diagnostics will be forwarded once complete

## 2021-06-14 NOTE — ED NOTES
Chuy Arce called from [facility] indicating that the [employer] cards (faxed) are for dental   Advised her that the cards indicate medical and dental and reminded her that per previous conversation, Crisis had already verified eligibility and the need for precert ONCE ACCEPTED  She requested precert be completed  Crisis Worker explained that we require an accepting physician and UR details to complete that and that it cannot be done until accepted  She advised that she was prepared to provide the information required and offered the following details:     Dr Rachel Banegas  (no Tax ID or NPI for him available; they use facility information)  TAX ID: [id number]  NPI: [id number]  UR contact: Winsome Gifford  Ph: [phone]  Fax: [phone]      Precert in progress with Asya at [phone]

## 2021-06-14 NOTE — ED NOTES
Call received from [facility] with [facility], who requested information pertaining to patient's insurance  Details were provided  She then indicated select labs were missing from the referral, including an EKG, CXR, and a CT of the head, which are required for behavioral health admission  Advised her that it is not standard and that it may not be warranted, but she requested that the additional diagnostics be requested  TT sent to Charge RN and Attending ED physician regarding same

## 2021-06-14 NOTE — ED NOTES
Bed search efforts:     [facility] - no beds per Doll Maria Isabel - referral faxed to [phone], but automated message does state that they have no bed availability currently  Shanell Pritchard - referral faxed to Kade Rogers per Dwight Paulino - not age appropriate  [place] - no female beds per [facility] - no appropriate beds per [address] - no beds per [address] per [facility] - no adult beds available per Qamar Lopez - full per [facility] - no beds per [facility] - voice mail message left  [facility] - referral faxed to Chad at [facility] - not age appropriate  [place] - no beds per [facility] - referral faxed to Gale Riedel for review  [facility] - not accepting referrals at this time per [facility]  [facility] - at capacity per [facility] - no beds per [facility] - no beds per [facility] - no female beds per [facility]  [facility] - referral faxed to [facility] - no available beds  [facility] - no beds per [employer]

## 2021-06-14 NOTE — ED NOTES
Called patient's insurance provider, [address] [place] [address] Gerhard, at [phone] and spoke with representative named Yung Peña  She confirmed the plan and said their records indicated this was the only coverage for this patient, and that she would have to be accepted to a treating facility before precert can be done, and to call back when we have that information  EVS indicated patient also has MA through [employer] as a secondary provider

## 2021-06-15 ENCOUNTER — HOSPITAL ENCOUNTER (INPATIENT)
Facility: HOSPITAL | Age: 66
LOS: 37 days | Discharge: HOME | DRG: 885 | End: 2021-07-22
Attending: PSYCHIATRY & NEUROLOGY | Admitting: PSYCHIATRY & NEUROLOGY
Payer: COMMERCIAL

## 2021-06-15 VITALS
SYSTOLIC BLOOD PRESSURE: 114 MMHG | DIASTOLIC BLOOD PRESSURE: 57 MMHG | RESPIRATION RATE: 18 BRPM | TEMPERATURE: 97.5 F | HEART RATE: 81 BPM | OXYGEN SATURATION: 96 %

## 2021-06-15 DIAGNOSIS — M79.18 CHRONIC MYOFASCIAL PAIN: ICD-10-CM

## 2021-06-15 DIAGNOSIS — E55.9 VITAMIN D DEFICIENCY: ICD-10-CM

## 2021-06-15 DIAGNOSIS — G89.29 CHRONIC HAND PAIN, RIGHT: ICD-10-CM

## 2021-06-15 DIAGNOSIS — G89.29 CHRONIC LOW BACK PAIN: ICD-10-CM

## 2021-06-15 DIAGNOSIS — G89.4 CHRONIC PAIN DISORDER: ICD-10-CM

## 2021-06-15 DIAGNOSIS — K21.9 GERD (GASTROESOPHAGEAL REFLUX DISEASE): ICD-10-CM

## 2021-06-15 DIAGNOSIS — M79.641 CHRONIC HAND PAIN, RIGHT: ICD-10-CM

## 2021-06-15 DIAGNOSIS — L60.2 OVERGROWN TOENAILS: ICD-10-CM

## 2021-06-15 DIAGNOSIS — I25.10 CAD (CORONARY ARTERY DISEASE): ICD-10-CM

## 2021-06-15 DIAGNOSIS — Z96.89 SPINAL CORD STIMULATOR STATUS: ICD-10-CM

## 2021-06-15 DIAGNOSIS — I10 ESSENTIAL HYPERTENSION: ICD-10-CM

## 2021-06-15 DIAGNOSIS — F33.3 MDD (MAJOR DEPRESSIVE DISORDER), RECURRENT, SEVERE, WITH PSYCHOSIS (HCC): Primary | ICD-10-CM

## 2021-06-15 DIAGNOSIS — M54.50 CHRONIC LOW BACK PAIN: ICD-10-CM

## 2021-06-15 DIAGNOSIS — M54.16 CHRONIC LUMBAR RADICULOPATHY: ICD-10-CM

## 2021-06-15 DIAGNOSIS — M19.90 DJD (DEGENERATIVE JOINT DISEASE): ICD-10-CM

## 2021-06-15 DIAGNOSIS — E78.5 DYSLIPIDEMIA: ICD-10-CM

## 2021-06-15 DIAGNOSIS — G89.29 CHRONIC MYOFASCIAL PAIN: ICD-10-CM

## 2021-06-15 LAB
ATRIAL RATE: 78 BPM
BACTERIA UR CULT: ABNORMAL
BACTERIA UR CULT: ABNORMAL
P AXIS: 59 DEGREES
PR INTERVAL: 226 MS
QRS AXIS: 51 DEGREES
QRSD INTERVAL: 102 MS
QT INTERVAL: 420 MS
QTC INTERVAL: 478 MS
T WAVE AXIS: 52 DEGREES
VENTRICULAR RATE: 78 BPM

## 2021-06-15 PROCEDURE — 93005 ELECTROCARDIOGRAM TRACING: CPT

## 2021-06-15 PROCEDURE — 93010 ELECTROCARDIOGRAM REPORT: CPT | Performed by: INTERNAL MEDICINE

## 2021-06-15 PROCEDURE — 1124F ACP DISCUSS-NO DSCNMKR DOCD: CPT | Performed by: NURSE PRACTITIONER

## 2021-06-15 PROCEDURE — 90792 PSYCH DIAG EVAL W/MED SRVCS: CPT | Performed by: PHYSICIAN ASSISTANT

## 2021-06-15 RX ORDER — ISOSORBIDE MONONITRATE 30 MG/1
30 TABLET, EXTENDED RELEASE ORAL DAILY
Status: CANCELLED | OUTPATIENT
Start: 2021-06-16

## 2021-06-15 RX ORDER — LANOLIN ALCOHOL/MO/W.PET/CERES
3 CREAM (GRAM) TOPICAL
Status: CANCELLED | OUTPATIENT
Start: 2021-06-15

## 2021-06-15 RX ORDER — POLYETHYLENE GLYCOL 3350 17 G/17G
17 POWDER, FOR SOLUTION ORAL DAILY PRN
Status: DISCONTINUED | OUTPATIENT
Start: 2021-06-15 | End: 2021-07-22 | Stop reason: HOSPADM

## 2021-06-15 RX ORDER — OLANZAPINE 2.5 MG/1
2.5 TABLET ORAL
Status: CANCELLED | OUTPATIENT
Start: 2021-06-15

## 2021-06-15 RX ORDER — OLANZAPINE 2.5 MG/1
10 TABLET ORAL DAILY
Status: CANCELLED | OUTPATIENT
Start: 2021-06-16

## 2021-06-15 RX ORDER — GABAPENTIN 100 MG/1
200 CAPSULE ORAL 4 TIMES DAILY
Status: CANCELLED | OUTPATIENT
Start: 2021-06-15

## 2021-06-15 RX ORDER — CEPHALEXIN 250 MG/1
500 CAPSULE ORAL EVERY 12 HOURS SCHEDULED
Status: CANCELLED | OUTPATIENT
Start: 2021-06-15 | End: 2021-06-18

## 2021-06-15 RX ORDER — HYDROXYZINE HYDROCHLORIDE 25 MG/1
25 TABLET, FILM COATED ORAL
Status: DISCONTINUED | OUTPATIENT
Start: 2021-06-15 | End: 2021-07-22 | Stop reason: HOSPADM

## 2021-06-15 RX ORDER — TRAZODONE HYDROCHLORIDE 50 MG/1
50 TABLET ORAL
Status: CANCELLED | OUTPATIENT
Start: 2021-06-15

## 2021-06-15 RX ORDER — TRAZODONE HYDROCHLORIDE 50 MG/1
50 TABLET ORAL
Status: DISCONTINUED | OUTPATIENT
Start: 2021-06-15 | End: 2021-07-22 | Stop reason: HOSPADM

## 2021-06-15 RX ORDER — RISPERIDONE 0.5 MG/1
0.5 TABLET, ORALLY DISINTEGRATING ORAL
Status: CANCELLED | OUTPATIENT
Start: 2021-06-15

## 2021-06-15 RX ORDER — LORAZEPAM 2 MG/ML
1 INJECTION INTRAMUSCULAR
Status: CANCELLED | OUTPATIENT
Start: 2021-06-15

## 2021-06-15 RX ORDER — RISPERIDONE 0.5 MG/1
0.5 TABLET, ORALLY DISINTEGRATING ORAL
Status: DISCONTINUED | OUTPATIENT
Start: 2021-06-15 | End: 2021-07-22 | Stop reason: HOSPADM

## 2021-06-15 RX ORDER — ACETAMINOPHEN 325 MG/1
650 TABLET ORAL EVERY 4 HOURS PRN
Status: DISCONTINUED | OUTPATIENT
Start: 2021-06-15 | End: 2021-07-22 | Stop reason: HOSPADM

## 2021-06-15 RX ORDER — OLANZAPINE 10 MG/1
5 INJECTION, POWDER, LYOPHILIZED, FOR SOLUTION INTRAMUSCULAR
Status: DISCONTINUED | OUTPATIENT
Start: 2021-06-15 | End: 2021-07-22 | Stop reason: HOSPADM

## 2021-06-15 RX ORDER — OLANZAPINE 5 MG/1
10 TABLET ORAL DAILY
Status: DISCONTINUED | OUTPATIENT
Start: 2021-06-16 | End: 2021-06-29

## 2021-06-15 RX ORDER — LORAZEPAM 1 MG/1
1 TABLET ORAL ONCE
Status: COMPLETED | OUTPATIENT
Start: 2021-06-15 | End: 2021-06-15

## 2021-06-15 RX ORDER — LANOLIN ALCOHOL/MO/W.PET/CERES
3 CREAM (GRAM) TOPICAL
Status: DISCONTINUED | OUTPATIENT
Start: 2021-06-15 | End: 2021-07-22 | Stop reason: HOSPADM

## 2021-06-15 RX ORDER — BUPROPION HYDROCHLORIDE 150 MG/1
150 TABLET ORAL DAILY
Status: DISCONTINUED | OUTPATIENT
Start: 2021-06-16 | End: 2021-06-17

## 2021-06-15 RX ORDER — OLANZAPINE 2.5 MG/1
2.5 TABLET ORAL
Status: DISCONTINUED | OUTPATIENT
Start: 2021-06-15 | End: 2021-07-22 | Stop reason: HOSPADM

## 2021-06-15 RX ORDER — PRAVASTATIN SODIUM 20 MG
20 TABLET ORAL
Status: DISCONTINUED | OUTPATIENT
Start: 2021-06-15 | End: 2021-07-22 | Stop reason: HOSPADM

## 2021-06-15 RX ORDER — BUPROPION HYDROCHLORIDE 150 MG/1
150 TABLET ORAL DAILY
Status: CANCELLED | OUTPATIENT
Start: 2021-06-16

## 2021-06-15 RX ORDER — ISOSORBIDE MONONITRATE 30 MG/1
30 TABLET, EXTENDED RELEASE ORAL DAILY
Status: DISCONTINUED | OUTPATIENT
Start: 2021-06-16 | End: 2021-07-22 | Stop reason: HOSPADM

## 2021-06-15 RX ORDER — LORAZEPAM 1 MG/1
1 TABLET ORAL
Status: CANCELLED | OUTPATIENT
Start: 2021-06-15

## 2021-06-15 RX ORDER — PRAVASTATIN SODIUM 20 MG
20 TABLET ORAL
Status: CANCELLED | OUTPATIENT
Start: 2021-06-15

## 2021-06-15 RX ORDER — GABAPENTIN 100 MG/1
200 CAPSULE ORAL 4 TIMES DAILY
Status: DISCONTINUED | OUTPATIENT
Start: 2021-06-15 | End: 2021-06-16

## 2021-06-15 RX ORDER — ACETAMINOPHEN 325 MG/1
650 TABLET ORAL EVERY 4 HOURS PRN
Status: CANCELLED | OUTPATIENT
Start: 2021-06-15

## 2021-06-15 RX ORDER — AMOXICILLIN 250 MG
1 CAPSULE ORAL DAILY PRN
Status: DISCONTINUED | OUTPATIENT
Start: 2021-06-15 | End: 2021-07-22 | Stop reason: HOSPADM

## 2021-06-15 RX ORDER — OLANZAPINE 10 MG/1
5 INJECTION, POWDER, LYOPHILIZED, FOR SOLUTION INTRAMUSCULAR
Status: CANCELLED | OUTPATIENT
Start: 2021-06-15

## 2021-06-15 RX ORDER — ACETAMINOPHEN 325 MG/1
975 TABLET ORAL EVERY 6 HOURS PRN
Status: DISCONTINUED | OUTPATIENT
Start: 2021-06-15 | End: 2021-06-21 | Stop reason: CLARIF

## 2021-06-15 RX ORDER — LORAZEPAM 2 MG/ML
1 INJECTION INTRAMUSCULAR
Status: DISCONTINUED | OUTPATIENT
Start: 2021-06-15 | End: 2021-07-01

## 2021-06-15 RX ORDER — AMOXICILLIN 250 MG
1 CAPSULE ORAL DAILY PRN
Status: CANCELLED | OUTPATIENT
Start: 2021-06-15

## 2021-06-15 RX ORDER — LANOLIN ALCOHOL/MO/W.PET/CERES
3 CREAM (GRAM) TOPICAL
Status: DISCONTINUED | OUTPATIENT
Start: 2021-06-15 | End: 2021-06-15 | Stop reason: HOSPADM

## 2021-06-15 RX ORDER — ACETAMINOPHEN 325 MG/1
975 TABLET ORAL EVERY 6 HOURS PRN
Status: CANCELLED | OUTPATIENT
Start: 2021-06-15

## 2021-06-15 RX ORDER — HYDROXYZINE HYDROCHLORIDE 25 MG/1
25 TABLET, FILM COATED ORAL
Status: CANCELLED | OUTPATIENT
Start: 2021-06-15

## 2021-06-15 RX ORDER — LORAZEPAM 0.5 MG/1
0.5 TABLET ORAL
Status: CANCELLED | OUTPATIENT
Start: 2021-06-15

## 2021-06-15 RX ORDER — CEPHALEXIN 250 MG/1
500 CAPSULE ORAL EVERY 12 HOURS SCHEDULED
Status: DISCONTINUED | OUTPATIENT
Start: 2021-06-15 | End: 2021-06-17

## 2021-06-15 RX ORDER — AMLODIPINE BESYLATE 5 MG/1
5 TABLET ORAL DAILY
Status: DISCONTINUED | OUTPATIENT
Start: 2021-06-16 | End: 2021-07-22 | Stop reason: HOSPADM

## 2021-06-15 RX ORDER — LORAZEPAM 0.5 MG/1
0.5 TABLET ORAL ONCE
Status: COMPLETED | OUTPATIENT
Start: 2021-06-15 | End: 2021-06-15

## 2021-06-15 RX ORDER — PANTOPRAZOLE SODIUM 40 MG/1
40 TABLET, DELAYED RELEASE ORAL
Status: DISCONTINUED | OUTPATIENT
Start: 2021-06-16 | End: 2021-07-22 | Stop reason: HOSPADM

## 2021-06-15 RX ORDER — LORAZEPAM 1 MG/1
1 TABLET ORAL
Status: DISCONTINUED | OUTPATIENT
Start: 2021-06-15 | End: 2021-07-01

## 2021-06-15 RX ORDER — MAGNESIUM HYDROXIDE/ALUMINUM HYDROXICE/SIMETHICONE 120; 1200; 1200 MG/30ML; MG/30ML; MG/30ML
30 SUSPENSION ORAL EVERY 4 HOURS PRN
Status: CANCELLED | OUTPATIENT
Start: 2021-06-15

## 2021-06-15 RX ORDER — OLANZAPINE 2.5 MG/1
5 TABLET ORAL
Status: CANCELLED | OUTPATIENT
Start: 2021-06-15

## 2021-06-15 RX ORDER — PANTOPRAZOLE SODIUM 40 MG/1
40 TABLET, DELAYED RELEASE ORAL DAILY
Status: CANCELLED | OUTPATIENT
Start: 2021-06-16

## 2021-06-15 RX ORDER — VENLAFAXINE HYDROCHLORIDE 150 MG/1
150 CAPSULE, EXTENDED RELEASE ORAL DAILY
Status: CANCELLED | OUTPATIENT
Start: 2021-06-16

## 2021-06-15 RX ORDER — OLANZAPINE 5 MG/1
5 TABLET ORAL
Status: DISCONTINUED | OUTPATIENT
Start: 2021-06-15 | End: 2021-07-22 | Stop reason: HOSPADM

## 2021-06-15 RX ORDER — MAGNESIUM HYDROXIDE/ALUMINUM HYDROXICE/SIMETHICONE 120; 1200; 1200 MG/30ML; MG/30ML; MG/30ML
30 SUSPENSION ORAL EVERY 4 HOURS PRN
Status: DISCONTINUED | OUTPATIENT
Start: 2021-06-15 | End: 2021-07-22 | Stop reason: HOSPADM

## 2021-06-15 RX ORDER — VENLAFAXINE HYDROCHLORIDE 150 MG/1
150 CAPSULE, EXTENDED RELEASE ORAL DAILY
Status: DISCONTINUED | OUTPATIENT
Start: 2021-06-16 | End: 2021-06-24

## 2021-06-15 RX ORDER — AMLODIPINE BESYLATE 5 MG/1
5 TABLET ORAL DAILY
Status: CANCELLED | OUTPATIENT
Start: 2021-06-16

## 2021-06-15 RX ORDER — LORAZEPAM 0.5 MG/1
0.5 TABLET ORAL
Status: DISCONTINUED | OUTPATIENT
Start: 2021-06-15 | End: 2021-07-01

## 2021-06-15 RX ORDER — POLYETHYLENE GLYCOL 3350 17 G/17G
17 POWDER, FOR SOLUTION ORAL DAILY PRN
Status: CANCELLED | OUTPATIENT
Start: 2021-06-15

## 2021-06-15 RX ADMIN — GABAPENTIN 200 MG: 100 CAPSULE ORAL at 08:34

## 2021-06-15 RX ADMIN — METOPROLOL TARTRATE 25 MG: 25 TABLET, FILM COATED ORAL at 08:33

## 2021-06-15 RX ADMIN — METOPROLOL TARTRATE 25 MG: 25 TABLET, FILM COATED ORAL at 21:13

## 2021-06-15 RX ADMIN — VENLAFAXINE HYDROCHLORIDE 150 MG: 150 CAPSULE, EXTENDED RELEASE ORAL at 08:33

## 2021-06-15 RX ADMIN — OLANZAPINE 10 MG: 2.5 TABLET, FILM COATED ORAL at 08:33

## 2021-06-15 RX ADMIN — PANTOPRAZOLE SODIUM 40 MG: 40 TABLET, DELAYED RELEASE ORAL at 08:34

## 2021-06-15 RX ADMIN — LORAZEPAM 1 MG: 1 TABLET ORAL at 04:10

## 2021-06-15 RX ADMIN — LORAZEPAM 0.5 MG: 0.5 TABLET ORAL at 00:57

## 2021-06-15 RX ADMIN — LORAZEPAM 1 MG: 1 TABLET ORAL at 13:54

## 2021-06-15 RX ADMIN — APIXABAN 5 MG: 5 TABLET, FILM COATED ORAL at 17:07

## 2021-06-15 RX ADMIN — ISOSORBIDE MONONITRATE 30 MG: 30 TABLET, EXTENDED RELEASE ORAL at 08:33

## 2021-06-15 RX ADMIN — GABAPENTIN 200 MG: 100 CAPSULE ORAL at 13:54

## 2021-06-15 RX ADMIN — BUPROPION HYDROCHLORIDE 300 MG: 300 TABLET, FILM COATED, EXTENDED RELEASE ORAL at 08:32

## 2021-06-15 RX ADMIN — MELATONIN 3 MG: at 21:13

## 2021-06-15 RX ADMIN — CEPHALEXIN 500 MG: 250 CAPSULE ORAL at 21:13

## 2021-06-15 RX ADMIN — GABAPENTIN 200 MG: 100 CAPSULE ORAL at 21:13

## 2021-06-15 RX ADMIN — MELATONIN 3 MG: at 00:57

## 2021-06-15 RX ADMIN — APIXABAN 5 MG: 5 TABLET, FILM COATED ORAL at 08:32

## 2021-06-15 RX ADMIN — AMLODIPINE BESYLATE 5 MG: 5 TABLET ORAL at 08:32

## 2021-06-15 RX ADMIN — CEPHALEXIN 500 MG: 250 CAPSULE ORAL at 08:33

## 2021-06-15 RX ADMIN — PRAVASTATIN SODIUM 20 MG: 20 TABLET ORAL at 17:08

## 2021-06-15 RX ADMIN — GABAPENTIN 200 MG: 100 CAPSULE ORAL at 17:08

## 2021-06-15 NOTE — ED NOTES
Patient is accepted at [facility],[address]  Patient is accepted by Dr Mary Alice Urbina per Tony Maynard in Intake  Transportation is arranged with [facility] per Isaac Chamberlain at [facility]  Transportation is scheduled for 3pm  (before 5pm/after 8pm)      Nurse report is to be called to [phone] prior to patient transfer      Justice Concepcion LMSW  06/15/21  7689

## 2021-06-15 NOTE — ED NOTES
EKG requested by Dr Ileana Alford   Sent RN a Latrell Hines, [facility]  06/15/21  2446

## 2021-06-15 NOTE — ED CARE HANDOFF
Emergency Department Sign Out Note        Sign out and transfer of care from Dr Ailyn Miguel  See Separate Emergency Department note  The patient, Sinai Dhaliwal, was evaluated by the previous provider for visual hallucinations, depression, passive SI  Workup Completed:  Medically cleared  201 signed  ED Course / Workup Pending (followup): Await bed placement  Await psychiatry evaluation since patient has been here for over 44 hours  Continue to monitor  ED Course as of Iker 15 0801   Mon Jun 14, 2021   1338 Psych facility requesting CT head and chest x-ray prior to considering patient for transfer there        Procedures  MDM    Disposition  Final diagnoses:   Visual hallucination     Time reflects when diagnosis was documented in both MDM as applicable and the Disposition within this note     Time User Action Codes Description Comment    6/13/2021  2:02 PM Jerry Whelan Add [R44 1] Visual hallucination       ED Disposition     ED Disposition Condition Date/Time Comment    Transfer to [facility] Jun 13, 2021  2:01 PM Sinai Dhaliwal should be transferred out to (location pending) and has been medically cleared  MD Documentation      Most Recent Value   Sending MD Dr Gillian Andrea    None       Patient's Medications   Discharge Prescriptions    No medications on file     No discharge procedures on file         ED Provider  Electronically Signed by     Nayeli Nichole DO  06/15/21 0637

## 2021-06-15 NOTE — ED NOTES
Pt in room talking to self  Pt requesting water and more cookies   Dr Ailyn Miguel made aware of pts current hallucinations     Catherine Navarrete RN  06/15/21 5008

## 2021-06-15 NOTE — PLAN OF CARE
Problem: Alteration in Thoughts and Perception  Goal: Treatment Goal: Gain control of psychotic behaviors/thinking, reduce/eliminate presenting symptoms and demonstrate improved reality functioning upon discharge  Outcome: Progressing  Goal: Verbalize thoughts and feelings  Description: Interventions:  - Promote a nonjudgmental and trusting relationship with the patient through active listening and therapeutic communication  - Assess patient's level of functioning, behavior and potential for risk  - Engage patient in 1 on 1 interactions  - Encourage patient to express fears, feelings, frustrations, and discuss symptoms    - [place] patient to reality, help patient recognize reality-based thinking   - Administer medications as ordered and assess for potential side effects  - Provide the patient education related to the signs and symptoms of the illness and desired effects of prescribed medications  Outcome: Progressing  Goal: Refrain from acting on delusional thinking/internal stimuli  Description: Interventions:  - Monitor patient closely, per order   - Utilize least restrictive measures   - Set reasonable limits, give positive feedback for acceptable   - Administer medications as ordered and monitor of potential side effects  Outcome: Progressing  Goal: Agree to be compliant with medication regime, as prescribed and report medication side effects  Description: Interventions:  - Offer appropriate PRN medication and supervise ingestion; conduct AIMS, as needed   Outcome: Progressing  Goal: Attend and participate in unit activities, including therapeutic, recreational, and educational groups  Description: Interventions:  -Encourage Visitation and family involvement in care  Outcome: Progressing  Goal: Recognize dysfunctional thoughts, communicate reality-based thoughts at the time of discharge  Description: Interventions:  - Provide medication and psycho-education to assist patient in compliance and developing insight into his/her illness   Outcome: Progressing  Goal: Complete daily ADLs, including personal hygiene independently, as able  Description: Interventions:  - Observe, teach, and assist patient with ADLS  - Monitor and promote a balance of rest/activity, with adequate nutrition and elimination   Outcome: Progressing     Problem: Ineffective Coping  Goal: Cooperates with admission process  Description: Interventions:   - Complete admission process  Outcome: Progressing  Goal: Identifies ineffective coping skills  Outcome: Progressing  Goal: Identifies healthy coping skills  Outcome: Progressing  Goal: Demonstrates healthy coping skills  Outcome: Progressing  Goal: Participates in unit activities  Description: Interventions:  - Provide therapeutic environment   - Provide required programming   - Redirect inappropriate behaviors   Outcome: Progressing  Goal: Patient/Family participate in treatment and DC plans  Description: Interventions:  - Provide therapeutic environment  Outcome: Progressing  Goal: Patient/Family verbalizes awareness of resources  Outcome: Progressing  Goal: Understands least restrictive measures  Description: Interventions:  - Utilize least restrictive behavior  Outcome: Progressing  Goal: Free from restraint events  Description: - Utilize least restrictive measures   - Provide behavioral interventions   - Redirect inappropriate behaviors   Outcome: Progressing     Problem: Depression  Goal: Treatment Goal: Demonstrate behavioral control of depressive symptoms, verbalize feelings of improved mood/affect, and adopt new coping skills prior to discharge  Outcome: Progressing  Goal: Verbalize thoughts and feelings  Description: Interventions:  - Assess and re-assess patient's level of risk   - Engage patient in 1:1 interactions, daily, for a minimum of 15 minutes   - Encourage patient to express feelings, fears, frustrations, hopes   Outcome: Progressing  Goal: Refrain from harming self  Description: Interventions:  - Monitor patient closely, per order   - Supervise medication ingestion, monitor effects and side effects   Outcome: Progressing  Goal: Refrain from isolation  Description: Interventions:  - Develop a trusting relationship   - Encourage socialization   Outcome: Progressing  Goal: Refrain from self-neglect  Outcome: Progressing  Goal: Attend and participate in unit activities, including therapeutic, recreational, and educational groups  Description: Interventions:  - Provide therapeutic and educational activities daily, encourage attendance and participation, and document same in the medical record   Outcome: Progressing  Goal: Complete daily ADLs, including personal hygiene independently, as able  Description: Interventions:  - Observe, teach, and assist patient with ADLS  -  Monitor and promote a balance of rest/activity, with adequate nutrition and elimination   Outcome: Progressing     Problem: Anxiety  Goal: Anxiety is at manageable level  Description: Interventions:  - Assess and monitor patient's anxiety level  - Monitor for signs and symptoms (heart palpitations, chest pain, shortness of breath, headaches, nausea, feeling jumpy, restlessness, irritable, apprehensive)  - Collaborate with interdisciplinary team and initiate plan and interventions as ordered  - Vest patient to unit/surroundings  - Explain treatment plan  - Encourage participation in care  - Encourage verbalization of concerns/fears  - Identify coping mechanisms  - Assist in developing anxiety-reducing skills  - Administer/offer alternative therapies  - Limit or eliminate stimulants  Outcome: Progressing     Problem: Nutrition/Hydration-ADULT  Goal: Nutrient/Hydration intake appropriate for improving, restoring or maintaining nutritional needs  Description: Monitor and assess patient's nutrition/hydration status for malnutrition   Collaborate with interdisciplinary team and initiate plan and interventions as ordered  Monitor patient's weight and dietary intake as ordered or per policy  Utilize nutrition screening tool and intervene as necessary  Determine patient's food preferences and provide high-protein, high-caloric foods as appropriate       INTERVENTIONS:  - Monitor oral intake, urinary output, labs, and treatment plans  - Assess nutrition and hydration status and recommend course of action  - Evaluate amount of meals eaten  - Assist patient with eating if necessary   - Allow adequate time for meals  - Recommend/ encourage appropriate diets, oral nutritional supplements, and vitamin/mineral supplements  - Order, calculate, and assess calorie counts as needed  - Recommend, monitor, and adjust tube feedings and TPN/PPN based on assessed needs  - Assess need for intravenous fluids  - Provide specific nutrition/hydration education as appropriate  - Include patient/family/caregiver in decisions related to nutrition  Outcome: Progressing

## 2021-06-15 NOTE — ED CARE HANDOFF
Emergency Department Sign Out Note        Sign out and transfer of care from Dr Edmund Bautista  See Separate Emergency Department note  The patient, Gina Ramon, was evaluated by the previous provider for hallucinations  ED Course as of Iker 15 0006   Sun Jun 13, 2021 2037 Patient care signed out from Raeann Gowers  Patient is a 76 yo F who is here with nightmares and hallucinations  Patient signed 12, pending bed placement  Patient is being treated for a UTI      Mon Jun 14, 2021   0143 Patient care signed out to Dr Veronique Brumfield  1625 Patient care signed out from Dr Emdund Bautista  Patient is a 77year old F- medically cleared by prior provider, 201 signed  Bed search in progress  1656 Patient anxious- will give 0 5 mg PO ativan  Also, as patient here for 29 hours and bed search is still in progress, will place psychiatry consult  Tue Iker 15, 2021   0004 Patient care signed out to Dr Eugene Shepherd  Procedures  MDM    Disposition  Final diagnoses:   Visual hallucination     Time reflects when diagnosis was documented in both MDM as applicable and the Disposition within this note     Time User Action Codes Description Comment    6/13/2021  2:02 PM Godwin Sheets Add [R44 1] Visual hallucination       ED Disposition     ED Disposition Condition Date/Time Comment    Transfer to [facility] Jun 13, 2021  2:01 PM Gina Ramon should be transferred out to (location pending) and has been medically cleared  MD Documentation      Most Recent Value   Sending MD Dr Arin Rosa    None       Patient's Medications   Discharge Prescriptions    No medications on file     No discharge procedures on file         ED Provider  Electronically Signed by     Tanja Hardy DO  06/15/21 0006

## 2021-06-15 NOTE — H&P
Lg Gonzalez#  NQI: F  KW    JOHANNA:[id number]  Adm Date: 6/15/2021 1613  4:13 PM   ATT PHY: Abel Luna, [facility]         Chief Complaint:  Worsening psychotic symptoms along with depression    History of Presenting Illness: Ismael Milton is a(n) 77y o  year old female who was admitted through emergency department where she presented with visual hallucinations, nightmares along with worsening depression and anxiety symptoms  Patient examined at bedside  Patient denied any active suicidal homicidal ideations      Allergies   Allergen Reactions    Latex     Tape  [Medical Tape]        Current Facility-Administered Medications on File Prior to Encounter   Medication Dose Route Frequency Provider Last Rate Last Admin    [COMPLETED] LORazepam (ATIVAN) tablet 0 5 mg  0 5 mg Oral Once Jaz Custard, DO   0 5 mg at 06/15/21 0057    [COMPLETED] LORazepam (ATIVAN) tablet 1 mg  1 mg Oral Once Lorravini Jewellr, DO   1 mg at 06/15/21 0410    [COMPLETED] LORazepam (ATIVAN) tablet 1 mg  1 mg Oral Once Jo Ann Khan, DO   1 mg at 06/15/21 1354    [DISCONTINUED] amLODIPine (NORVASC) tablet 5 mg  5 mg Oral Daily Mariana Khat, PA-C   5 mg at 06/15/21 1655    [DISCONTINUED] apixaban (ELIQUIS) tablet 5 mg  5 mg Oral BID Mariana Khat, PA-C   5 mg at 06/15/21 4064    [DISCONTINUED] buPROPion (WELLBUTRIN XL) 24 hr tablet 300 mg  300 mg Oral Daily Mariana Khat, PA-C   300 mg at 06/15/21 2268    [DISCONTINUED] cephalexin (KEFLEX) capsule 500 mg  500 mg Oral Q12H [address] Mariana Khat, PA-C   500 mg at 06/15/21 5812    [DISCONTINUED] gabapentin (NEURONTIN) capsule 200 mg  200 mg Oral 4x Daily Mariana Khat, PA-C   200 mg at 06/15/21 1354    [DISCONTINUED] isosorbide mononitrate (IMDUR) 24 hr tablet 30 mg  30 mg Oral Daily Mariana Khat, PA-C   30 mg at 06/15/21 5624    [DISCONTINUED] melatonin tablet 3 mg  3 mg Oral HS Jaz Custard, DO   3 mg at 06/15/21 0057    [DISCONTINUED] metoprolol tartrate (LOPRESSOR) tablet 25 mg  25 mg Oral Q12H [address] Gertrudis Bush PA-C   25 mg at 06/15/21 2205    [DISCONTINUED] OLANZapine (ZyPREXA) tablet 10 mg  10 mg Oral Daily LEXX Lawson-C   10 mg at 06/15/21 5153    [DISCONTINUED] pantoprazole (PROTONIX) EC tablet 40 mg  40 mg Oral Daily PASCALE LawsonC   40 mg at 06/15/21 4207    [DISCONTINUED] pravastatin (PRAVACHOL) tablet 20 mg  20 mg Oral Daily With Evalina Councilman, PA-C   20 mg at 06/14/21 2122    [DISCONTINUED] venlafaxine (EFFEXOR-XR) 24 hr capsule 150 mg  150 mg Oral Daily PASCALE LawsonC   150 mg at 06/15/21 4245     Current Outpatient Medications on File Prior to Encounter   Medication Sig Dispense Refill    acetaminophen (TYLENOL) 325 mg tablet Take 2 tablets by mouth every 6 (six) hours 30 tablet 0    amLODIPine (NORVASC) 5 mg tablet Take 5 mg by mouth daily   buPROPion (WELLBUTRIN XL) 300 mg 24 hr tablet Take 300 mg by mouth daily      clonazePAM (KlonoPIN) 2 mg tablet Take 1 mg by mouth daily at bedtime as needed for seizures   gabapentin (NEURONTIN) 100 mg capsule Take 200 mg by mouth 4 (four) times a day      isosorbide mononitrate (IMDUR) 30 mg 24 hr tablet Take 30 mg by mouth 3 (three) times a day   meloxicam (MOBIC) 7 5 mg tablet Take 1 PO BID with food  NO OTHER NSAIDS 60 tablet 5    metoprolol tartrate (LOPRESSOR) 25 mg tablet Take 75 mg by mouth daily   OLANZapine (ZyPREXA) 10 mg tablet Take 10 mg by mouth daily at bedtime      pantoprazole (PROTONIX) 40 mg tablet Take 40 mg by mouth daily  1    simvastatin (ZOCOR) 20 mg tablet Take 20 mg by mouth daily at bedtime   venlafaxine (EFFEXOR-XR) 150 mg 24 hr capsule Take 150 mg by mouth daily      aspirin 81 MG tablet Take 81 mg by mouth daily        cyclobenzaprine (FLEXERIL) 10 mg tablet Take 1 PO BID (Patient not taking: Reported on 6/15/2021) 60 tablet 5    Lidocaine HCl (DR Rohith CURRAN MOUTH WASH) SWISH AND SPIT WITH 2 TEASPOONSFUL 3 TIMES DAILY  1       Active Ambulatory Problems     Diagnosis Date Noted    Spinal cord stimulator status 10/27/2016    Essential hypertension 10/27/2016    Chronic hand pain, right 12/14/2017    Chronic low back pain 10/09/2015    Chronic lumbar radiculopathy 09/28/2015    Chronic myofascial pain 10/09/2015    Chronic pain disorder 04/06/2016    Lumbar foraminal stenosis 09/28/2015    Osteoarthritis of lumbar spine with myelopathy 10/09/2015    Spondylolisthesis at L5-S1 level 03/28/2016    Acquired trigger finger 08/30/2017    Carpal tunnel syndrome 08/30/2017    Hand pain 08/30/2017    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    Ulnar nerve abnormality 08/30/2017     Resolved Ambulatory Problems     Diagnosis Date Noted    Intractable low back pain 10/27/2016    COPD (chronic obstructive pulmonary disease) ([facility] ) 10/27/2016    CHF (congestive heart failure) ([facility] ) 89/92/9402    Uncomplicated opioid dependence ([facility] ) 02/17/2017     Past Medical History:   Diagnosis Date    Cardiac disease     Chronic pain     DJD (degenerative joint disease)     Gait abnormality     GERD (gastroesophageal reflux disease)     Hyperlipidemia     Hypertension     Insomnia        Past Surgical History:   Procedure Laterality Date    CARDIAC PACEMAKER PLACEMENT      CHOLECYSTECTOMY      CORONARY STENT PLACEMENT      GASTRIC BYPASS      HERNIA REPAIR      KNEE ARTHROPLASTY      OK SURG IMPLNT NEUROELECT,EPIDURAL N/A 10/25/2016    Procedure: INSERTION DORSAL COLUMN SPINAL CORD STIMULATOR WITH IPG ( IMPULSE) ;   Surgeon: Melina Persaud MD;  [facility];  Service: Orthopedics    WRIST FUSION         Social History:   Social History     Socioeconomic History    Marital status:      Spouse name: None    Number of children: None    Years of education: None    Highest education level: None   Occupational History    None   Tobacco Use    Smoking status: Former Smoker    Smokeless tobacco: Never Used   Substance and Sexual Activity    Alcohol use: Yes     Comment: social    Drug use: No    Sexual activity: None   Other Topics Concern    None   Social History Narrative    None     Social Determinants of Health     Financial Resource Strain:     Difficulty of Paying Living Expenses:    Food Insecurity:     Worried About Running Out of Food in the Last Year:     [address] in the Last Year:    Transportation Needs:     Lack of Transportation (Medical):  Lack of Transportation (Non-Medical):    Physical Activity:     Days of Exercise per Week:     Minutes of Exercise per Session:    Stress:     Feeling of Stress :    Social Connections:     Frequency of Communication with Friends and Family:     Frequency of Social Gatherings with Friends and Family:     Attends [employer] Services:     Active Member of Clubs or Organizations:     Attends Club or Organization Meetings:     Marital Status:    Intimate Partner Violence:     Fear of Current or Ex-Partner:     Emotionally Abused:     Physically Abused:     Sexually Abused:        Family History:   Family History   Problem Relation Age of Onset    COPD Mother     Hypertension Mother     Heart attack Father     Hypertension Father     Coronary artery disease Father        Review of Systems   Respiratory: Positive for cough  Gastrointestinal: Positive for constipation  Musculoskeletal: Positive for arthralgias, back pain, gait problem and myalgias  Neurological: Positive for weakness  All other systems reviewed and are negative  Physical Exam   Vitals: Blood pressure 147/87, pulse 72, temperature 98 3 °F (36 8 °C), temperature source Temporal, resp  rate 18, height 5' 3" (1 6 m), weight 101 kg (221 lb 12 8 oz), SpO2 97 %  ,Body mass index is 39 29 kg/m²  Constitutional: Awake and Alert  Well-developed and well-nourished  No distress     HENT: PERR,EOMI, conjunctiva normal  Head: Normocephalic and atraumatic  Mouth/Throat: Oropharynx is clear and moist     Eyes: Conjunctivae and EOM are normal  Pupils are equal, round, and reactive to light  Right and left eye exhibits no discharge  Neck: Neck supple  No tracheal deviation present  No thyromegaly present  Cardiovascular: Normal rate, regular rhythm and normal heart sounds  Exam reveals no friction rub  No murmur heard  Pulmonary/Chest: Effort normal and breath sounds normal  No respiratory distress  She has no wheezes  Abdominal: Soft  Bowel sounds are normal  She exhibits no distension  There is no tenderness  There is no rebound and no guarding  Neurological: Cranial Nerves grossly intact  No sensory deficit  Coordination normal    Musculoskeletal:   Nontender spine  Skin: Skin is warm and dry  No rash noted  No diaphoresis  No erythema  No edema  No cyanosis  Assessment     Rekha Guthrie is a(n) 77y o  year old female with MDD with psychotic symptoms    1  Cardiac with history of hypertension, dyslipidemia, CHF, atrial fibrillation, coronary artery disease status post pacemaker placement  Patient will be continued on metoprolol 25 mg b i d , amlodipine 5 mg daily, Imdur 30 mg daily, Pravachol 20 mg daily and Eliquis 5 mg 2 times daily  2  Neuropathy  Patient is on gabapentin 200 mg 4 times daily  3  GERD  Patient is on Protonix 40 mg daily  4  DJD/osteoarthritis  Patient may continue to receive Tylenol on as needed basis  5  Gait abnormality  I will consult PT OT for further evaluation and treatment  6  Acute UTI  Patient has symptomatic UTI  I will put the patient on Keflex 500 mg b i d  Day # 1/7   6  Psych with MDD  Patient is being managed by psych  Prognosis: Fair  Discharge Plan: In progress  Advanced Directives: I have discussed in detail the patient the advanced directives  Patient has not appointed anybody as her POA and has no living will with advanced healthcare directives    Patient's 1st contact is Blaire Padron phone number [phone]  When discussing cardiac and pulmonary resuscitation efforts with the patient, the patient wishes to be FULL CODE  I have spent more than 50 minutes gathering data, doing physical examination, and discussing the advanced directives, which was witnessed by caring staff

## 2021-06-15 NOTE — ED NOTES
Bed search continued at this time:    [facility]- full at this time; no known discharges  [facility]- admissions not available overnight, but clinical can be faxed for AM follow up  [facility]- potential bed availability    Clinical faxed to [facility] and [facility] at this time  Pt was previously denied at [facility] [address], and [address] [place] [address]

## 2021-06-15 NOTE — ED NOTES
Attempted to call [employer] x 2 to complete precert  However, after almost 10 mins on hold, message indicates: '"due to circumstances beyond our control we are unable to complete your call at this time" and then the call ends       MATA Muñoz  06/15/21   2005

## 2021-06-15 NOTE — EMTALA/ACUTE CARE TRANSFER
[facility] EMERGENCY DEPARTMENT  [address] [facility] [address]  Dept: [phone]      EHAFLT TRANSFER CONSENT    NAME Jossy THOMPSON 1955                              MRN [id number]    I have been informed of my rights regarding examination, treatment, and transfer   by Dr Cm Hurtado DO    Benefits: Other benefits (Include comment)_______________________ (Inpatient Psych Tx [phone]))    Risks: Potential for delay in receiving treatment      Consent for Transfer:  I acknowledge that my medical condition has been evaluated and explained to me by the emergency department physician or other qualified medical person and/or my attending physician, who has recommended that I be transferred to the service of  Accepting Physician: Dr Kerry Arias at [address] Name, [facility]       Sheree György  [address]  [facility] AFFILIATED WITH [place], [place]  The above potential benefits of such transfer, the potential risks associated with such transfer, and the probable risks of not being transferred have been explained to me, and I fully understand them  The doctor has explained that, in my case, the benefits of transfer outweigh the risks  I agree to be transferred  I authorize the performance of emergency medical procedures and treatments upon me in both transit and upon arrival at the receiving facility  Additionally, I authorize the release of any and all medical records to the receiving facility and request they be transported with me, if possible  I understand that the safest mode of transportation during a medical emergency is an ambulance and that the Hospital advocates the use of this mode of transport  Risks of traveling to the receiving facility by car, including absence of medical control, life sustaining equipment, such as oxygen, and medical personnel has been explained to me and I fully understand them      ([place] BELOW)  [ Viviana Schuler  I consent to the stated transfer and to be transported by ambulance/helicopter  [  ]  I consent to the stated transfer, but refuse transportation by ambulance and accept full responsibility for my transportation by car  I understand the risks of non-ambulance transfers and I exonerate the Hospital and its staff from any deterioration in my condition that results from this refusal     X___________________________________________    DATE  06/15/21  TIME________  Signature of patient or legally responsible individual signing on patient behalf           RELATIONSHIP TO PATIENT_________________________          Provider Certification    NAME Kerry Washburn                                         1955                              MRN [id number]    A medical screening exam was performed on the above named patient  Based on the examination:    Condition Necessitating Transfer The primary encounter diagnosis was Visual hallucination  Diagnoses of Chronic low back pain, Essential hypertension, and Spinal cord stimulator status were also pertinent to this visit      Patient Condition: The patient has been stabilized such that within reasonable medical probability, no material deterioration of the patient condition or the condition of the unborn child(william) is likely to result from the transfer    Reason for Transfer: Level of Care needed not available at this facility    Transfer Requirements: [address] [place] [address]       [address] [place], [place]   · Space available and qualified personnel available for treatment as acknowledged by    · Agreed to accept transfer and to provide appropriate medical treatment as acknowledged by         [employer]  · Appropriate medical records of the examination and treatment of the patient are provided at the time of transfer   [address], [address] _______  · Transfer will be performed by qualified personnel from    and appropriate transfer equipment as required, including the use of necessary and appropriate life support measures  Provider Certification: I have examined the patient and explained the following risks and benefits of being transferred/refusing transfer to the patient/family:  The patient is stable for psychiatric transfer because they are medically stable, and is protected from harming him/herself or others during transport      Based on these reasonable risks and benefits to the patient and/or the unborn child(william), and based upon the information available at the time of the patients examination, I certify that the medical benefits reasonably to be expected from the provision of appropriate medical treatments at another medical facility outweigh the increasing risks, if any, to the individuals medical condition, and in the case of labor to the unborn child, from effecting the transfer      X____________________________________________ DATE 06/15/21        TIME_______      ORIGINAL - SEND TO MEDICAL RECORDS   COPY - SEND WITH PATIENT DURING TRANSFER

## 2021-06-15 NOTE — ED NOTES
Patient given supplies for personal hygiene access to shower  New scrubs, socks, underwear provided to patient  Oral hygiene care performed independently        Mechelle Granger RN  06/14/21 7008

## 2021-06-15 NOTE — NURSING NOTE
Pt admitted to [address] from [facility] ED on a voluntary status  Pt admitted for visual hallucinations, nightmares, passive thoughts no plan or intent, and increased depression  Pt states feeling safe and denies current SI and hallucinations  Pt has visual hallucinations at night of people and nightmares  Pt is pleasant, talkative, and controlled  Pt cooperative with admission process  Pt endorses anxiety and depression due to losses and current friends terminal illness  Pt currently stays with her daughter for safety  Diet changed to dental soft for missing teeth and no dentures brought in  Q 7 min safety checks initiated and maintained

## 2021-06-15 NOTE — CONSULTS
Patient currently in [address] [facility] ED due to psychosis, depression, anxiety, and passive suicidal ideation  1  Patient accepted by Isael Horn  2  Continue home medications  3   PRN psychiatric medications for agitation only

## 2021-06-15 NOTE — ED NOTES
Patient in secure holding 2   Hair brush taken and place in personal belongings bag     Zahida Steinberg  06/14/21 1541

## 2021-06-15 NOTE — ED NOTES
Pt ambulated to bathroom with no issues       [facility]  06/15/21 4268

## 2021-06-15 NOTE — ED NOTES
Per Intake, GRANT Altamirano OA is reviewing       Vanessa Auguste, [facility]  06/15/21  111

## 2021-06-16 PROBLEM — I25.10 CAD (CORONARY ARTERY DISEASE): Status: ACTIVE | Noted: 2021-06-16

## 2021-06-16 PROBLEM — E55.9 VITAMIN D DEFICIENCY: Status: ACTIVE | Noted: 2021-06-16

## 2021-06-16 PROBLEM — N39.0 UTI (URINARY TRACT INFECTION): Status: ACTIVE | Noted: 2021-06-16

## 2021-06-16 PROBLEM — F33.3 MDD (MAJOR DEPRESSIVE DISORDER), RECURRENT, SEVERE, WITH PSYCHOSIS (HCC): Status: ACTIVE | Noted: 2021-06-16

## 2021-06-16 LAB
25(OH)D3 SERPL-MCNC: 19.1 NG/ML (ref 30–100)
FOLATE SERPL-MCNC: 13 NG/ML (ref 3.1–17.5)
VIT B12 SERPL-MCNC: 116 PG/ML (ref 100–900)

## 2021-06-16 PROCEDURE — 82607 VITAMIN B-12: CPT | Performed by: FAMILY MEDICINE

## 2021-06-16 PROCEDURE — 99223 1ST HOSP IP/OBS HIGH 75: CPT | Performed by: PSYCHIATRY & NEUROLOGY

## 2021-06-16 PROCEDURE — 82306 VITAMIN D 25 HYDROXY: CPT | Performed by: FAMILY MEDICINE

## 2021-06-16 PROCEDURE — 82746 ASSAY OF FOLIC ACID SERUM: CPT | Performed by: FAMILY MEDICINE

## 2021-06-16 PROCEDURE — 97163 PT EVAL HIGH COMPLEX 45 MIN: CPT

## 2021-06-16 PROCEDURE — 97167 OT EVAL HIGH COMPLEX 60 MIN: CPT

## 2021-06-16 RX ORDER — MELATONIN
1000 DAILY
Status: DISCONTINUED | OUTPATIENT
Start: 2021-08-04 | End: 2021-07-22 | Stop reason: HOSPADM

## 2021-06-16 RX ORDER — ERGOCALCIFEROL 1.25 MG/1
50000 CAPSULE ORAL WEEKLY
Status: DISCONTINUED | OUTPATIENT
Start: 2021-06-16 | End: 2021-07-22 | Stop reason: HOSPADM

## 2021-06-16 RX ORDER — GABAPENTIN 300 MG/1
300 CAPSULE ORAL 3 TIMES DAILY
Status: DISCONTINUED | OUTPATIENT
Start: 2021-06-16 | End: 2021-07-22 | Stop reason: HOSPADM

## 2021-06-16 RX ORDER — GABAPENTIN 300 MG/1
300 CAPSULE ORAL 4 TIMES DAILY
Status: DISCONTINUED | OUTPATIENT
Start: 2021-06-16 | End: 2021-06-16

## 2021-06-16 RX ADMIN — GABAPENTIN 300 MG: 300 CAPSULE ORAL at 21:19

## 2021-06-16 RX ADMIN — DICLOFENAC SODIUM 2 G: 10 GEL TOPICAL at 17:17

## 2021-06-16 RX ADMIN — GABAPENTIN 200 MG: 100 CAPSULE ORAL at 08:17

## 2021-06-16 RX ADMIN — BUPROPION HYDROCHLORIDE 150 MG: 150 TABLET, EXTENDED RELEASE ORAL at 08:17

## 2021-06-16 RX ADMIN — VENLAFAXINE HYDROCHLORIDE 150 MG: 150 CAPSULE, EXTENDED RELEASE ORAL at 08:17

## 2021-06-16 RX ADMIN — ALUMINUM HYDROXIDE, MAGNESIUM HYDROXIDE, AND SIMETHICONE 30 ML: 200; 200; 20 SUSPENSION ORAL at 11:22

## 2021-06-16 RX ADMIN — APIXABAN 5 MG: 5 TABLET, FILM COATED ORAL at 08:17

## 2021-06-16 RX ADMIN — OLANZAPINE 10 MG: 5 TABLET ORAL at 08:17

## 2021-06-16 RX ADMIN — AMLODIPINE BESYLATE 5 MG: 5 TABLET ORAL at 08:17

## 2021-06-16 RX ADMIN — PRAVASTATIN SODIUM 20 MG: 20 TABLET ORAL at 15:45

## 2021-06-16 RX ADMIN — MELATONIN 3 MG: at 21:20

## 2021-06-16 RX ADMIN — LORAZEPAM 1 MG: 1 TABLET ORAL at 17:21

## 2021-06-16 RX ADMIN — ACETAMINOPHEN 975 MG: 325 TABLET ORAL at 02:58

## 2021-06-16 RX ADMIN — METOPROLOL TARTRATE 25 MG: 25 TABLET, FILM COATED ORAL at 08:17

## 2021-06-16 RX ADMIN — LORAZEPAM 1 MG: 1 TABLET ORAL at 02:59

## 2021-06-16 RX ADMIN — TRAZODONE HYDROCHLORIDE 50 MG: 50 TABLET ORAL at 00:38

## 2021-06-16 RX ADMIN — PANTOPRAZOLE SODIUM 40 MG: 40 TABLET, DELAYED RELEASE ORAL at 05:33

## 2021-06-16 RX ADMIN — DICLOFENAC SODIUM 2 G: 10 GEL TOPICAL at 12:48

## 2021-06-16 RX ADMIN — APIXABAN 5 MG: 5 TABLET, FILM COATED ORAL at 17:16

## 2021-06-16 RX ADMIN — METOPROLOL TARTRATE 25 MG: 25 TABLET, FILM COATED ORAL at 21:20

## 2021-06-16 RX ADMIN — ERGOCALCIFEROL 50000 UNITS: 1.25 CAPSULE ORAL at 11:21

## 2021-06-16 RX ADMIN — ISOSORBIDE MONONITRATE 30 MG: 30 TABLET, EXTENDED RELEASE ORAL at 08:17

## 2021-06-16 RX ADMIN — GABAPENTIN 300 MG: 300 CAPSULE ORAL at 11:22

## 2021-06-16 RX ADMIN — CEPHALEXIN 500 MG: 250 CAPSULE ORAL at 08:17

## 2021-06-16 RX ADMIN — CEPHALEXIN 500 MG: 250 CAPSULE ORAL at 21:19

## 2021-06-16 RX ADMIN — GABAPENTIN 300 MG: 300 CAPSULE ORAL at 15:45

## 2021-06-16 RX ADMIN — DICLOFENAC SODIUM 2 G: 10 GEL TOPICAL at 21:24

## 2021-06-16 NOTE — PHYSICAL THERAPY NOTE
Physical Therapy Evaluation     Patient's Name: Bettie Justice    Admitting Diagnosis  Depression [F32 9]  Psychosis [facility]) [F29]    Problem List  Patient Active Problem List   Diagnosis    Spinal cord stimulator status    Essential hypertension    Chronic hand pain, right    Chronic low back pain    Chronic lumbar radiculopathy    Chronic myofascial pain    Chronic pain disorder    Lumbar foraminal stenosis    Osteoarthritis of lumbar spine with myelopathy    Spondylolisthesis at L5-S1 level    Acquired trigger finger    Carpal tunnel syndrome    Hand pain    Kienbock's disease of adults    Ulnar nerve abnormality    Vitamin D deficiency    CAD (coronary artery disease)    UTI (urinary tract infection)    MDD (major depressive disorder), recurrent, severe, with psychosis ([facility] [address] )       Past Medical History  Past Medical History:   Diagnosis Date    Cardiac disease     [facility]) [facility])     Chronic pain     COPD (chronic obstructive pulmonary disease) ([facility] [address] )     DJD (degenerative joint disease)     Gait abnormality     GERD (gastroesophageal reflux disease)     Hyperlipidemia     Hypertension     Insomnia        Past Surgical History  Past Surgical History:   Procedure Laterality Date    CARDIAC PACEMAKER PLACEMENT      CHOLECYSTECTOMY      CORONARY STENT PLACEMENT      GASTRIC BYPASS      HERNIA REPAIR      KNEE ARTHROPLASTY      [place] SURG IMPLNT NEUROELECT,EPIDURAL N/A 10/25/2016    Procedure: INSERTION DORSAL COLUMN SPINAL CORD STIMULATOR WITH IPG ( IMPULSE) ;   Surgeon: Sudhir Schmitz MD;  [facility];  Service: Orthopedics    WRIST FUSION            06/16/21 0752   PT Last Visit   PT Visit Date 06/16/21   Note Type   Note type Evaluation   Pain Assessment   Pain Assessment Tool 0-10   Pain Score 6   Pain Location/Orientation Orientation: Right;Location: Shoulder   Pain Onset/Description Onset: Ongoing;Frequency: Constant/Continuous   Patient's Stated Pain Goal No pain   Hospital Pain Intervention(s) Emotional support   Multiple Pain Sites Yes   Pain 2   Pain Score 2 6   Pain Location/Orientation 2 Orientation: Lower; Location: Back   Pain Onset/Description 2 Onset: Ongoing;Frequency: Constant/Continuous   Patient's Stated Pain Goal 2 No pain   Hospital Pain Intervention(s) 2 Repositioned; Ambulation/increased activity; Emotional support; Rest   Home Living   Type of [address] One level;Performs ADLs on one level; Able to live on main level with bedroom/bathroom;Stairs to enter without rails  (4 LG)   Bathroom Shower/Tub Walk-in shower   Bathroom Toilet Standard   Bathroom Equipment Grab bars in shower; Shower chair   [address]   (no AD at baseline)   Additional Comments pt reports recently being evicted from her apartment   Prior Function   Level of [place] Independent with ADLs and functional mobility   Lives With [address] Help From Family   ADL Assistance Independent   IADLs Needs assistance  (pt reports her grandson carries laundry for her)   Falls in the last 6 months 1 to 4  (1 fall 2 days ago)   Restrictions/Precautions   Weight Bearing Precautions Per Order No   Other Precautions Fall Risk;Cognitive   General   Family/Caregiver Present No   Cognition   Arousal/Participation Alert   Orientation Level Oriented X4   Memory Decreased recall of precautions   Following Commands Follows one step commands without difficulty   Comments pt agreeable to PT session   RLE Assessment   RLE Assessment X   Strength RLE   RLE Overall Strength 4-/5   LLE Assessment   LLE Assessment X   Strength LLE   LLE Overall Strength 4-/5   Coordination   Movements are Fluid and Coordinated 0   Sensation X  (pt reporting N/T B feet)   Bed Mobility   Supine to Sit Unable to assess  (pt received OOB in dining room)   Transfers   Sit to Stand 6  Modified independent   Additional items Impulsive;Verbal cues   Stand to Sit 6  Modified independent   Additional items Impulsive;Verbal cues   Ambulation/Elevation   Gait pattern Improper Weight shift;Decreased foot clearance  (+ lateral veering, lateral LOB)   Gait Assistance 5  Supervision   Additional items Assist x 1;Verbal cues; Tactile cues   Assistive Device None   Distance 150 ft   Stair Management Assistance Not tested   Balance   Static Sitting Good   Dynamic Sitting Fair +   Static Standing Fair +   Dynamic Standing Fair   Ambulatory Fair   Endurance Deficit   Endurance Deficit Yes   Activity Tolerance   Activity Tolerance Patient limited by fatigue;Patient limited by pain   Nurse Made Aware Yes, RN made aware of outcomes/recs   Assessment   Prognosis Good   Problem List Decreased strength;Decreased endurance; Impaired balance;Decreased mobility; Decreased safety awareness   Assessment Pt is 77 y o  female seen for PT evaluation on 6/16/2021 s/p admit to [facility] on 6/15/2021 w/ MDD (major depressive disorder), recurrent, severe, with psychosis ([facility] [address] )  PT consulted to assess pt's functional mobility and d/c needs  Order placed for PT eval and tx, w/ up and OOB as tolerated order  PT performed at least 2 patient identifiers during session: Name and wristband  Comorbidities affecting pt's physical performance at time of assessment include: HTN, dyslipidemia, CHF, AFib, CAD, neuropathy, GERD, DJD/OA, gait abnormality, acute UTI  PTA, pt was independent w/ all functional mobility w/ no AD, ambulates household distances, has 4 LG and lives alone in 1 level apartment  Personal factors affecting pt at time of IE include: inability to navigate level surfaces w/o external assistance, unable to perform dynamic tasks in community, limited home support, positive fall history, decreased initiation and engagement, impulsivity, unable to perform physical activity, limited insight into impairments, inability to perform IADLs and inability to perform ADLs   Please find objective findings from PT assessment regarding body systems outlined above with impairments and limitations including weakness, impaired balance, decreased endurance, decreased activity tolerance, decreased functional mobility tolerance, decreased safety awareness, fall risk and decreased cognition, as well as mobility assessment (need for cueing for mobility technique)  The following objective measures performed on IE also reveal limitations: AM-PAC 6-Clicks: 73/60  Pt's clinical presentation is currently unstable/unpredictable seen in pt's presentation of need for input for task focus and mobility technique and ongoing medical assessment  Pt to benefit from continued PT tx to address deficits as defined above and maximize level of functional independent mobility and consistency  From PT/mobility standpoint, recommendation at time of d/c would be home with home health rehabilitation pending progress in order to facilitate return to PLOF  Barriers to Discharge Inaccessible home environment;Decreased caregiver support   Goals   Patient Goals "to get stronger"   STG Expiration Date 07/06/21   Short Term Goal #1 In 15-20 days: Increase bilateral LE strength 1/2 grade to facilitate independent mobility, Perform all bed mobility tasks modified independent to decrease caregiver burden, Perform all transfers modified independent to improve independence, Ambulate > 250 ft  with least restrictive assistive device modified independent w/o LOB and w/ normalized gait pattern 100% of the time, Navigate 4 stairs modified independent without handrail to facilitate return to previous living environment, Increase all balance 1/2 grade to decrease risk for falls and Complete exercise program independently   PT Treatment Day 0   Plan   Treatment/Interventions Functional transfer training;Elevations; Therapeutic exercise; Endurance training;LE strengthening/ROM; Patient/family training;Gait training;Spoke to nursing   PT Frequency   (2-5x/wk) Recommendation   PT Discharge Recommendation Home with home health rehabilitation   Equipment Recommended   (TBD pending progress)   PT - OK to Discharge No   Additional Comments Pt's raw score on the AM-PAC Basic Mobility inpatient short form is 19, standardized score is 42 48  Patients at this level are likely to benefit from DC to home with home care, however, please refer to therapist recommendation for safe DC planning     AM-PAC Basic Mobility Inpatient   Turning in Bed Without Bedrails 4   Lying on Back to Sitting on Edge of Flat Bed 4   Moving Bed to Chair 3   Standing Up From Chair 3   Walk in Room 3   Climb 3-5 Stairs 2   Basic Mobility Inpatient Raw Score 19   Basic Mobility Standardized Score 42 48         Noemi Campo, PT

## 2021-06-16 NOTE — PROGRESS NOTES
Pt medicated for c/o increased anxiety 9/10 @ 1721 with Ativan 1 mg po as ordered by MD with moderate relief obtained  Dominique Carroll - Ariana @ that time  Q 7 min checks maintained to monitor pt's behavior & safety

## 2021-06-16 NOTE — PROGRESS NOTES
Progress Note - Jose Vidal 77 y o  female MRN: [id number]    Unit/Bed#[facility] 201-01 Encounter: [id number]        Subjective:   Patient seen and examined at bedside after reviewing the chart and discussing the case with the caring staff  Patient examined at bedside  Patient was noticed to have evidence of UTI on admission with moderate amount of leukocytes and bacteria but negative nitrite  Patient's urine culture is growing E coli sensitive to cephalosporins  On review of patient's labs it was found that patient's vitamin-D levels were low 19 1  Physical Exam   Vitals: Blood pressure 130/74, pulse 73, temperature 98 2 °F (36 8 °C), temperature source Temporal, resp  rate 18, height 5' 3" (1 6 m), weight 101 kg (221 lb 12 8 oz), SpO2 94 %  ,Body mass index is 39 29 kg/m²  Constitutional: He appears well-developed  HEENT: PERR, EOMI, MMM  Cardiovascular: Normal rate and regular rhythm  Pulmonary/Chest: Effort normal and breath sounds normal    Abdomen: Soft, + BS, NT    Assessment/Plan:  Jose Vidal is a(n) 77y o  year old female with MDD with psychotic symptoms     1  Cardiac with history of hypertension, dyslipidemia, CHF, atrial fibrillation, coronary artery disease status post pacemaker placement  Patient will be continued on metoprolol 25 mg b i d , amlodipine 5 mg daily, Imdur 30 mg daily, Pravachol 20 mg daily and Eliquis 5 mg 2 times daily  2  Neuropathy  Patient is on gabapentin 200 mg 4 times daily  3  GERD  Patient is on Protonix 40 mg daily  4  DJD/osteoarthritis  Patient may continue to receive Tylenol on as needed basis  I will also put the patient on Voltaren gel to be applied over the affected area  5  Gait abnormality  I will consult PT OT for further evaluation and treatment  6  Acute UTI  Patient has symptomatic UTI  I will put the patient on Keflex 500 mg b i d  Day # 1/7   7  New vitamin-D deficiency    I will put the patient on vitamin-D bolus doses for 8 weeks followed by vitamin D3 1000 units daily

## 2021-06-16 NOTE — ED NOTES
COB completed with Darrell Nation at [facility], MATA  06/15/21   0551

## 2021-06-16 NOTE — ED NOTES
Insurance Authorization for admission:   Phone call placed to [employer]  Phone number: [phone]  Spoke to [place]      7 days approved  Level of care: Acute Inpt  (201)  Review on 06/21/2021  Authorization # [id number]  EVS (Eligibility Verification System) called - [phone]  Automated system indicates: Active with Medicare/[employer] primary and secondary [facility] (ID# [id number])    Insurance Authorization for Transportation:    Not required with CTS transport       MATA [place]  06/15/21   [id number]

## 2021-06-16 NOTE — OCCUPATIONAL THERAPY NOTE
Occupational Therapy Evaluation      Concetta Hurtadods    6/16/2021    Patient Active Problem List   Diagnosis    Spinal cord stimulator status    Essential hypertension    Chronic hand pain, right    Chronic low back pain    Chronic lumbar radiculopathy    Chronic myofascial pain    Chronic pain disorder    Lumbar foraminal stenosis    Osteoarthritis of lumbar spine with myelopathy    Spondylolisthesis at L5-S1 level    Acquired trigger finger    Carpal tunnel syndrome    Hand pain    Kienbock's disease of adults    Ulnar nerve abnormality    Vitamin D deficiency    CAD (coronary artery disease)    UTI (urinary tract infection)    MDD (major depressive disorder), recurrent, severe, with psychosis ([facility] [address] )       Past Medical History:   Diagnosis Date    Cardiac disease     [facility]congestive [facility] failure) ([facility])     Chronic pain     COPD (chronic obstructive pulmonary disease) ([facility] [address] )     DJD (degenerative joint disease)     Gait abnormality     GERD (gastroesophageal reflux disease)     Hyperlipidemia     Hypertension     Insomnia        Past Surgical History:   Procedure Laterality Date    CARDIAC PACEMAKER PLACEMENT      CHOLECYSTECTOMY      CORONARY STENT PLACEMENT      GASTRIC BYPASS      HERNIA REPAIR      KNEE ARTHROPLASTY      [place] SURG IMPLNT NEUROELECT,EPIDURAL N/A 10/25/2016    Procedure: INSERTION DORSAL COLUMN SPINAL CORD STIMULATOR WITH IPG ( IMPULSE) ; Surgeon: Koffi Estrada MD;  [facility];  Service: Orthopedics    WRIST FUSION          06/16/21 0754   OT Last Visit   OT Visit Date 06/16/21   Note Type   Note type Evaluation   Restrictions/Precautions   Weight Bearing Precautions Per Order No   Other Precautions Fall Risk;Cognitive   Pain Assessment   Pain Assessment Tool 0-10   Pain Score 6   Pain Location/Orientation Orientation: Right;Location: Shoulder   Pain Onset/Description Onset: Ongoing;Frequency: Constant/Continuous; Descriptor: OLGA JOVEL JR  [facility] Pain Intervention(s) Ambulation/increased activity;Repositioned; Emotional support   Multiple Pain Sites Yes   Pain 2   Pain Score 2 6   Pain Location/Orientation 2 Location: Back   Pain Onset/Description 2 Onset: Ongoing;Frequency: Constant/Continuous; Descriptor: [address] Pain Intervention(s) 2 Ambulation/increased activity;Repositioned; Emotional support   Home Living   Type of [address] One level;Performs ADLs on one level; Able to live on main level with bedroom/bathroom;Stairs to enter with rails  (4 LG)   Bathroom Shower/Tub Walk-in shower   Bathroom Toilet Standard   Bathroom Equipment Grab bars in shower; Shower chair   [address]  (no AD used at baseline )   Additional Comments Patient reports that she was evicted from her apartment    Prior Function   Level of [place] Independent with ADLs and functional mobility   Lives With Alone   Receives Help From Family  (grandson )   ADL Assistance Independent  (pt reports increased difficulty 2/2 limited shoulder ROM )   IADLs Needs assistance  (pt reports her grandson carries laundry for her)   Falls in the last 6 months 1 to 4  (1 fall 2 days ago)   Vocational Retired   Comments (-) driving, pt reports that she does not have a car    Lifestyle   Autonomy Patient reporting being independent with ADLs, ambulatory with no AD and lives alone in a one level apartment    Reciprocal Relationships grandson and friend    Service to Others retired    ADL   Eating Assistance 6  Modified independent   [address] [place] [address]  [address] [id number] [address] 5  [address] [facility] 5  [address]  [address] [place] [address]  Supervision/Setup   Bed Mobility   Additional Comments DNT bed mobility: pt seated OOB in dinning area upon arrival and at end of eval    Transfers   Sit to Stand 6  Modified independent   Additional items Armrests; Impulsive;Verbal cues   Stand to Sit 6  Modified independent   Additional items Armrests; Verbal cues   Functional Mobility   Functional Mobility 5  Supervision   Additional Comments Pt ambulated in hallway with no SOB  Pt grossly unsteady with multiple lateral LOBs  Pt demonstrates decreased safety awareness  Additional items   (Pt ambulated with no AD )   Balance   Static Sitting Good   Dynamic Sitting Fair +   Static Standing Fair +   Dynamic Standing Fair   Activity Tolerance   Activity Tolerance Patient limited by fatigue;Patient limited by pain   Medical Staff Made Aware Pt seen as a co-eval with PT Estrellita due to the patient's co-morbidities, clinically unstable presentation, and present impairments which are a regression from the patient's baseline  Nurse Made Aware RN made aware of outcomes    RUE Assessment   RUE Assessment WFL  (~90 degree shoulder flexion, 4-/5 MMT)   LUE Assessment   LUE Assessment WFL  (~120 degree shoulder flexion, 4-/5 MMT)   Hand Function   Gross Motor Coordination Functional   Fine Motor Coordination Functional   Sensation   Light Touch Partial deficits in the RUE;Partial deficits in the LUE  (numbness in B hands reported )   Cognition   Overall Cognitive Status [facility]   Arousal/Participation Alert; Responsive; Cooperative   Attention Attends with cues to redirect   Orientation Level Oriented X4   Memory Decreased recall of precautions   Following Commands Follows one step commands without difficulty   Comments Pt agreeable to OT eval    Assessment   Limitation Decreased ADL status; Decreased UE strength;Decreased Safe judgement during ADL;Decreased endurance;Decreased self-care trans;Decreased high-level ADLs; Decreased sensation   Prognosis Good   Assessment Patient is a 77 y o  female seen for OT evaluation s/p admit to [address] [place] [address]  on 6/15/2021 w/MDD (major depressive disorder), recurrent, severe, with psychosis ([facility] [address] )  Commorbidities affecting patient's functional performance at time of assessment include: CAD, HTN, OA of lumbar spine, and UTI  Orders placed for OT evaluation and treatment and up and OOB as tolerated   Performed at least two patient identifiers during session including name and wristband  Prior to admission, Patient reporting being independent with ADLs, ambulatory with no AD and lives alone in a one level apartment  Personal factors affecting patient at time of initial evaluation include: steps to enter, limited insight into deficits, difficulty performing ADLs and difficulty performing IADLs  Upon evaluation, patient requires supervision assist for UB ADLs, minimal  assist for LB ADLs, transfers and functional ambulation in room and bathroom with supervision assist, with the use of no AD  Patient is oriented x 4  Occupational performance is affected by the following deficits: impulsive behavior, limited active ROM, decreased muscle strength, degenerative arthritic joint changes, dynamic sit/ stand balance deficit with poor standing tolerance time for self care and functional mobility, decreased activity tolerance, impaired sensation, decreased safety awareness, increased pain, impaired interpersonal skills and delayed righting and equilibrium reactions  Patient to benefit from continued Occupational Therapy treatment while in the hospital to address deficits as defined above and maximize level of functional independence with ADLs and functional mobility  Occupational Performance areas to address include: grooming , bathing/ shower, dressing, toilet hygiene, transfer to all surfaces, functional ambulation, medication routine/ management, IADLS: Household maintenance, IADLs: safety procedures and IADLs: meal prep/ clean up  From OT standpoint, recommendation at time of d/c would be Home with home health rehabilitation       Goals   Patient Goals to get stronger    Plan   Treatment Interventions ADL retraining;Functional transfer training;UE strengthening/ROM; Endurance training;Patient/family training; Compensatory technique education; Activityengagement   Goal Expiration Date 07/06/21   OT Treatment Day 0   OT Frequency 1-2x/wk   Recommendation   OT Discharge Recommendation Home with home health rehabilitation   OT - OK to Discharge Yes  (Once medically cleared )   Additional Comments  The patient's raw score on the AM-PAC Daily Activity inpatient short form is 20, standardized score is 42 03, greater than 39 4  Patients at this level are likely to benefit from discharge to home  Please refer to the recommendation of the Occupational Therapist for safe discharge planning     AM-PAC Daily Activity Inpatient   Lower Body Dressing 3   Bathing 3   Toileting 3   Upper Body Dressing 3   Grooming 4   Eating 4   Daily Activity Raw Score 20   Daily Activity Standardized Score (Calc for Raw Score >=11) 42 03   -[facility] Applied Cognition Inpatient   Following a Speech/Presentation 4   Understanding Ordinary Conversation 4   Taking Medications 3   Remembering Where Things Are Placed or Put Away 3   Remembering List of 4-5 Errands 3   Taking Care of Complicated Tasks 3   Applied Cognition Raw Score 20   Applied Cognition Standardized Score 41 76     GOALS:    *ADL transfers with (I) for inc'd independence with ADLs/purposeful tasks    *UB ADL with (I) for inc'd independence with self cares    *LB ADL with (I) using AE prn for inc'd independence with self cares    *Toileting with (I) for clothing management and hygiene for return to PLOF with personal care    *Increase stand tolerance x5 m for inc'd tolerance with standing purposeful tasks    *Participate in 10m UE therex to increase overall stamina/activity tolerance for purposeful tasks    *Bed mobility- (I) for inc'd independence to manage own comfort and initiate EOB & OOB purposeful tasks    *Patient will verbalize 3 safety awareness/ principles to prevent falls in the home setting  *Patient will verbalize and demonstrate use of energy conservation/deep breathing techniques and work simplification skills during functional activities with no verbal cues  *Patient will increase OOB/sitting tolerance to 2-4 hours per day to increase participation in self-care and leisure tasks with no s/s of exertion  *Patient will engage in ongoing cognitive assessment to assist with safe discharge planning/recommendations        *Pt will demonstrate use of long handled AE during 100% of tx sessions for increased ADL safety and independence following D/C     Pedro Toure MS, OTR/L

## 2021-06-16 NOTE — H&P
Psychiatric Evaluation - Behavioral Health     Identification Data:Patricia Dyan Hammans 77 y o  female MRN: [id number]  Unit/Bed#: [facility]- Encounter: [id number]    Chief Complaint: depression, suicidal ideation, psychotic symptoms, auditory hallucinations, visual halluciantions, inability to care for self, opioid use and confusion    History of Present Illness     Joshua Fountain is a 77 y o  female with a history of depression versus bipolar disorder who was admitted to the inpatient older adult psychiatric unit on a voluntary 201 commitment basis due to severe worsening of depression, anxiety, unstable mood, psychotic symptoms, auditory hallucinations, visual hallucinations, delusional thoughts, confusion and inability to care for self  On evaluation in the inpatient psychiatric unit Lorne Thomas presents initially tearful, anxious and reports increased restlessness, difficulty maintaining sleep d/t nightmares of "demons," and visual hallucinations of living and  acquaintances and auditory hallucinations of hearing her daughter's voice  Reports visual hallucinations of demons started approximately one month ago after "Ceci's death  Patient also eports increasing depressive symptoms due to death of sister, friend and now terminal illness of best friend for 40 years  "I don't know how I will go on if she dies, and I can't stop her from dying " She denies current SI/HI, plan, intent or passive death wish and contracts for safety on the unit  She is limited historian and difficult to determine if she has been compliant with prescribed medications  Currently being treated for an UTI with Keflex  She is oriented to self, situation but not in date  She is  unable to name location and timelines are confused and incongruent and needs frequent redirection and refocusing is answering questions  She agrees to be compliant with medication and treatment plan      Psychiatric Review Of Systems:    Sleep changes: decreased, Reports "broken sleep" d/t repeated nightmares of demons   Appetite changes:no change  Weight changes: weight gain 10 lb  Energy: decreased, reports energy in "non-existant "  Interest/pleasure/: decreased, reports no energy to complete ADLs or activities of choice  Anhedonia: yes  Anxiety: yes, worrying about close friend with terminal illness and housing   Hodan: h/o mood lability  Guilt:  yes, feels guilty there is nothing she can do to stop her friend from dying  Hopeless:  no  Self injurious behavior/risky behavior: no  Suicidal ideation: yes, no plan  Homicidal ideation: no  Auditory hallucinations: yes, auditory hallucinations of daughter's voice in the next room  Visual hallucinations: yes, visual hallucinations of acquaintances both  and living, demons in black capes  Delusional thinking: yes, repeatedly spoke of a man kidnapping her many years ago   Eating disorder history: no  Obsessive/compulsive symptoms: no    Historical Information     Past Psychiatric History:     Past Inpatient Psychiatric Treatment:   Past inpatient psychiatric admissions at [facility] earlier in 2021  Unclear if previous past psychiatric hospitalizations d/t impaired memory and disorganization   Past Outpatient Psychiatric Treatment:    Was in outpatient psychiatric treatment in the past with a psychiatrist  Reports seeing Dr Lalito Roth at [facility] 10 years ago  Reports extensive history of psychotherapy     Past Suicide Attempts: no  Past Violent Behavior: Denies  Past Psychiatric Medication Trials: Effexor, Wellbutrin XL, Zyprexa and unclear about psychotropic medication in past     Substance Abuse History:    Social History     Tobacco History     Smoking Status  Former Smoker    Smokeless Tobacco Use  Never Used          Alcohol History     Alcohol Use Status  Yes Comment  social          Drug Use     Drug Use Status  No          Sexual Activity     Sexually Active  Not Asked Activities of Daily Living    Not Asked                 I have assessed this patient for substance use within the past 12 months    Alcohol use: was drinking heavily in the past: 1 time per day  Recreational drug use: denies    Family Psychiatric History: unknown    Social History:    Education: 9th grade  Marital History:   Children: 3 adult child adult son and 2 adult daughters years old  Living Arrangement: is presently homeless  Occupational History: unemployed  Functioning Relationships: good relationship with children  Legal History: none   History: None    Traumatic History:   Unclear as unable to appropriately answer questions if trauma or if it is delusional     Past Medical History:      Past Medical History:   Diagnosis Date    Cardiac disease     [facility]est[facility]     Chronic pain     [facility]     DJD (degenerative joint disease)     Gait abnormality     GERD (gastroesophageal reflux disease)     Hyperlipidemia     Hypertension     Insomnia      Past Surgical History:   Procedure Laterality Date    CARDIAC PACEMAKER PLACEMENT      CHOLECYSTECTOMY      CORONARY STENT PLACEMENT      GASTRIC BYPASS      HERNIA REPAIR      KNEE ARTHROPLASTY      LA SURG IMPLNT NEUROELECT,EPIDURAL N/A 10/25/2016    Procedure: INSERTION DORSAL COLUMN SPINAL CORD STIMULATOR WITH IPG ( IMPULSE) ; Surgeon: Jalil Gonsalez MD;  Location: [facility];  Service: Orthopedics    WRIST FUSION         Medical Review Of Systems:    Negative except HPI    Allergies: Allergies   Allergen Reactions    Latex     Tape  [Medical Tape]        Medications: All current active medications have been reviewed      OBJECTIVE:    Vital signs in last 24 hours:    Temp:  [97 6 °F (36 4 °C)-98 3 °F (36 8 °C)] 98 2 °F (36 8 °C)  HR:  [70-81] 73  Resp:  [16-18] 18  BP: (114-147)/(57-87) 130/74      Intake/Output Summary (Last 24 hours) at 6/16/2021 [address] filed at 6/15/2021 2000  Gross per 24 hour   Intake 585 ml   Output --   Net 585 ml        Mental Status Evaluation:    Appearance:  disheveled, overweight   Behavior:  pleasant, cooperative, somewhat redirectable, restless, responds to redirection   Speech:  fluent, coherent, increased rate, hypertalkative, circumstantial   Mood:  depressed, anxious   Affect:  labile   Language: repeating phrases   Thought Process:  coherent   Associations: circumstantial associations   Thought Content:  some paranoia   Perceptual Disturbances: appears preoccupied   Risk Potential: Suicidal ideation - None at present  Homicidal ideation - None  Potential for aggression - No   Sensorium:  oriented to person, situation and year   Memory:  recent memory mildly impaired   Consciousness:  alert and awake   Attention: attention span and concentration appear shorter than expected for age, decreased concentration and decreased attention span   Intellect: average   Fund of Knowledge: awareness of current events: limited  past history: limited   Insight:  impaired   Judgment: impaired   Muscle Strength Muscle Tone: normal  normal   Gait/Station: normal gait/station   Motor Activity: no abnormal movements       Laboratory Results:   I have personally reviewed all pertinent laboratory/tests results  Imaging Studies:   XR chest 2 views    Result Date: 6/15/2021  Narrative: CHEST INDICATION:   altered mental status  COMPARISON:  01/19/2015 EXAM PERFORMED/VIEWS:  XR CHEST PA & LATERAL  The frontal view was performed utilizing dual energy radiographic technique  Images: 4 FINDINGS: Cardiomediastinal silhouette appears unremarkable  A permanent pacemaker is present entering on the left  The pulmonary vessels are normal  The lungs are clear  No pneumothorax or pleural effusion  A dorsal column stimulator is present unchanged  Degenerative changes throughout the thoracic spine  Impression: No acute cardiopulmonary disease  Workstation performed: [id number]     CT head without contrast    Result Date: 6/14/2021  Narrative: CT BRAIN - WITHOUT CONTRAST INDICATION:   Altered mental status ams  COMPARISON:  CT dated 9/2/2016  Best Alfaro TECHNIQUE:  CT examination of the brain was performed  In addition to axial images, sagittal and coronal 2D reformatted images were created and submitted for interpretation  Radiation dose length product (DLP) for this visit:  854 mGy-cm   This examination, like all CT scans performed in the [facility], was performed utilizing techniques to minimize radiation dose exposure, including the use of iterative reconstruction and automated exposure control  IMAGE QUALITY:  Diagnostic  FINDINGS: PARENCHYMA: Decreased attenuation is noted in periventricular and subcortical white matter demonstrating an appearance that is statistically most likely to represent moderate to severe microangiopathic change  No CT signs of acute infarction  No intracranial mass, mass effect or midline shift  No acute parenchymal hemorrhage  VENTRICLES AND EXTRA-AXIAL SPACES:  Normal for the patient's age  VISUALIZED ORBITS AND PARANASAL SINUSES:  Unremarkable  CALVARIUM AND EXTRACRANIAL SOFT TISSUES:  Normal      Impression: No acute intracranial abnormality  Microangiopathic changes   Workstation performed: [id number]       Code Status: Level 1 - Full Code  Advance Directive and Living Will: <no information>    Assessment/Plan   Principal Problem:    MDD (major depressive disorder), recurrent, severe, with psychosis ([facility] )  Active Problems:    Essential hypertension    Chronic hand pain, right    Chronic low back pain    Osteoarthritis of lumbar spine with myelopathy    Vitamin D deficiency    CAD (coronary artery disease)    UTI (urinary tract infection)      Patient Strengths: average or above intelligence, cooperative, good support system, patient is on a voluntary commitment     Patient Barriers: chronic pain, financial instability, homeless, impaired cognition, lack of financial means, limited education, limited insight    Treatment Plan:     Planned Treatment and Medication Changes: All current active medications have been reviewed  Encourage group therapy, milieu therapy and occupational therapy  Behavioral Health checks every 7 minutes  Begin Wellbutrin, Effexor, Zyprexa  Risks / Benefits of Treatment:    Risks, benefits, and possible side effects of medications explained to patient and patient verbalizes understanding and agreement for treatment  Counseling / Coordination of Care:    Patient's presentation on admission and proposed treatment plan discussed with treatment team   Diagnosis, medication changes and treatment plan reviewed with patient  Events leading to admission reviewed with patient      Inpatient Psychiatric Certification:    Estimated length of stay: 13 midnights      Tiara Scruggs MD 06/16/21

## 2021-06-16 NOTE — NURSING NOTE
Patient is asleep at this time  She appears to be comfortable and in no acute distress  No further complaints of pain or difficulty sleeping voiced by patient  Will CTM via q7 minute safety checks

## 2021-06-16 NOTE — PROGRESS NOTES
Patient came with the followin (cat pic) blanket  1 pink cloth mask  1 purple pants  1 pr sandals  6 pr assorted underwear  6 prs assorted color socks  1 black bra  1 lt green shirt  1 pr grey shorts  1 pr grey sweats  1 grey t-shirt  1 pink brush  1 pr eyeglasses  Locked in patients contraband    1 green hoodie  1 pr grey/green pants  1 large hair clip  11 prs assorted socks  4 prs of assorted underwear  1 green back pack  1 red bag  1 pr green capris  1 pr green pants  1 pr white pants  3 black t-shirts  1 white shirt  2 green t-shirts    Locked in the safe  BAG#[id number]    20 $1 dollar bills        No cell phone, no wallet    Patient signed for all the above

## 2021-06-16 NOTE — PLAN OF CARE
Problem: PHYSICAL THERAPY ADULT  Goal: Performs mobility at highest level of function for planned discharge setting  See evaluation for individualized goals  Description: Treatment/Interventions: Functional transfer training, Elevations, Therapeutic exercise, Endurance training, LE strengthening/ROM, Patient/family training, Gait training, Spoke to nursing  Equipment Recommended:  (TBD pending progress)       See flowsheet documentation for full assessment, interventions and recommendations  6/16/2021 1127 by Keli Spence, PT  Note: Prognosis: Good  Problem List: Decreased strength, Decreased endurance, Impaired balance, Decreased mobility, Decreased safety awareness  Assessment: Pt is 77 y o  female seen for PT evaluation on 6/16/2021 s/p admit to [facility] on 6/15/2021 w/ MDD (major depressive disorder), recurrent, severe, with psychosis ([facility] )  PT consulted to assess pt's functional mobility and d/c needs  Order placed for PT eval and tx, w/ up and OOB as tolerated order  PT performed at least 2 patient identifiers during session: Name and wristband  Comorbidities affecting pt's physical performance at time of assessment include: HTN, dyslipidemia, CHF, AFib, CAD, neuropathy, GERD, DJD/OA, gait abnormality, acute UTI  PTA, pt was independent w/ all functional mobility w/ no AD, ambulates household distances, has 4 LG and lives alone in 1 level apartment  Personal factors affecting pt at time of IE include: inability to navigate level surfaces w/o external assistance, unable to perform dynamic tasks in community, limited home support, positive fall history, decreased initiation and engagement, impulsivity, unable to perform physical activity, limited insight into impairments, inability to perform IADLs and inability to perform ADLs   Please find objective findings from PT assessment regarding body systems outlined above with impairments and limitations including weakness, impaired balance, decreased endurance, decreased activity tolerance, decreased functional mobility tolerance, decreased safety awareness, fall risk and decreased cognition, as well as mobility assessment (need for cueing for mobility technique)  The following objective measures performed on IE also reveal limitations: AM-PAC 6-Clicks: 04/95  Pt's clinical presentation is currently unstable/unpredictable seen in pt's presentation of need for input for task focus and mobility technique and ongoing medical assessment  Pt to benefit from continued PT tx to address deficits as defined above and maximize level of functional independent mobility and consistency  From PT/mobility standpoint, recommendation at time of d/c would be home with home health rehabilitation pending progress in order to facilitate return to PLOF  Barriers to Discharge: Inaccessible home environment, Decreased caregiver support        PT Discharge Recommendation: Home with home health rehabilitation     PT - OK to Discharge: No    See flowsheet documentation for full assessment

## 2021-06-16 NOTE — NURSING NOTE
Patient was visible in the community this evening  She is pleasant and cooperative during staff interactions  Attended group and snack time  She denies any pain  Endorses anxiety with a rating of 5/10, states "I always have a lot of depression "  Endorses VH  Denies SI and HI at time of assessment  She was medication compliant at HS  No acute behaviors noted  Will CTM via q7 minute safety checks

## 2021-06-16 NOTE — PROGRESS NOTES
Pt up ad krish & gait is steady  Pt c/o depression 10/10 & anxiety 10/10  Q 7 min checks maintained to monitor pt's behavior & safety  Pt c/o visual hallucinations @ times & sees people that she knows both living & dead  Pt denies any suicidal or homicidal ideations  Pt is pleasant & cooperative  Pt is compliant with medications

## 2021-06-16 NOTE — PLAN OF CARE
Problem: OCCUPATIONAL THERAPY ADULT  Goal: Performs self-care activities at highest level of function for planned discharge setting  See evaluation for individualized goals  Description: Treatment Interventions: ADL retraining, Functional transfer training, UE strengthening/ROM, Endurance training, Patient/family training, Compensatory technique education, Activityengagement          See flowsheet documentation for full assessment, interventions and recommendations  6/16/2021 1151 by Debbie Fairbanks OT  Note: Limitation: Decreased ADL status, Decreased UE strength, Decreased Safe judgement during ADL, Decreased endurance, Decreased self-care trans, Decreased high-level ADLs, Decreased sensation  Prognosis: Good  Assessment: Patient is a 77 y o  female seen for OT evaluation s/p admit to [facility]  on 6/15/2021 w/MDD (major depressive disorder), recurrent, severe, with psychosis ([facility] )  Commorbidities affecting patient's functional performance at time of assessment include: CAD, HTN, OA of lumbar spine, and UTI  Orders placed for OT evaluation and treatment and up and OOB as tolerated   Performed at least two patient identifiers during session including name and wristband  Prior to admission, Patient reporting being independent with ADLs, ambulatory with no AD and lives alone in a one level apartment  Personal factors affecting patient at time of initial evaluation include: steps to enter, limited insight into deficits, difficulty performing ADLs and difficulty performing IADLs  Upon evaluation, patient requires supervision assist for UB ADLs, minimal  assist for LB ADLs, transfers and functional ambulation in room and bathroom with supervision assist, with the use of no AD  Patient is oriented x 4    Occupational performance is affected by the following deficits: impulsive behavior, limited active ROM, decreased muscle strength, degenerative arthritic joint changes, dynamic sit/ stand balance deficit with poor standing tolerance time for self care and functional mobility, decreased activity tolerance, impaired sensation, decreased safety awareness, increased pain, impaired interpersonal skills and delayed righting and equilibrium reactions  Patient to benefit from continued Occupational Therapy treatment while in the hospital to address deficits as defined above and maximize level of functional independence with ADLs and functional mobility  Occupational Performance areas to address include: grooming , bathing/ shower, dressing, toilet hygiene, transfer to all surfaces, functional ambulation, medication routine/ management, IADLS: Household maintenance, IADLs: safety procedures and IADLs: meal prep/ clean up  From OT standpoint, recommendation at time of d/c would be Home with home health rehabilitation  OT Discharge Recommendation: Home with home health rehabilitation  OT - OK to Discharge: Yes (Once medically cleared )    6/16/2021 1151 by Endy Lan OT  Note: Limitation: Decreased ADL status, Decreased UE strength, Decreased Safe judgement during ADL, Decreased endurance, Decreased self-care trans, Decreased high-level ADLs, Decreased sensation  Prognosis: Good  Assessment: Patient is a 77 y o  female seen for OT evaluation s/p admit to [address] [place] [address]  on 6/15/2021 w/MDD (major depressive disorder), recurrent, severe, with psychosis ([facility] )  Commorbidities affecting patient's functional performance at time of assessment include: CAD, HTN, OA of lumbar spine, and UTI  Orders placed for OT evaluation and treatment and up and OOB as tolerated   Performed at least two patient identifiers during session including name and wristband  Prior to admission, Patient reporting being independent with ADLs, ambulatory with no AD and lives alone in a one level apartment   Personal factors affecting patient at time of initial evaluation include: steps to enter, limited insight into deficits, difficulty performing ADLs and difficulty performing IADLs  Upon evaluation, patient requires supervision assist for UB ADLs, minimal  assist for LB ADLs, transfers and functional ambulation in room and bathroom with supervision assist, with the use of no AD  Patient is oriented x 4  Occupational performance is affected by the following deficits: impulsive behavior, limited active ROM, decreased muscle strength, degenerative arthritic joint changes, dynamic sit/ stand balance deficit with poor standing tolerance time for self care and functional mobility, decreased activity tolerance, impaired sensation, decreased safety awareness, increased pain, impaired interpersonal skills and delayed righting and equilibrium reactions  Patient to benefit from continued Occupational Therapy treatment while in the hospital to address deficits as defined above and maximize level of functional independence with ADLs and functional mobility  Occupational Performance areas to address include: grooming , bathing/ shower, dressing, toilet hygiene, transfer to all surfaces, functional ambulation, medication routine/ management, IADLS: Household maintenance, IADLs: safety procedures and IADLs: meal prep/ clean up  From OT standpoint, recommendation at time of d/c would be Home with home health rehabilitation         OT Discharge Recommendation: Home with home health rehabilitation  OT - OK to Discharge: Yes (Once medically cleared )     Endy Lan OT

## 2021-06-16 NOTE — PLAN OF CARE
Problem: DISCHARGE PLANNING - CARE MANAGEMENT  Goal: Discharge to post-acute care or home with appropriate resources  Description: INTERVENTIONS:  - Conduct assessment to determine patient/family and health care team treatment goals, and need for post-acute services based on payer coverage, community resources, and patient preferences, and barriers to discharge  - Address psychosocial, clinical, and financial barriers to discharge as identified in assessment in conjunction with the patient/family and health care team  - Arrange appropriate level of post-acute services according to patients   needs and preference and payer coverage in collaboration with the physician and health care team  - Communicate with and update the patient/family, physician, and health care team regarding progress on the discharge plan  - Arrange appropriate transportation to post-acute venues  Outcome: Progressing     Pt New to unit;  Initial Goal Added

## 2021-06-16 NOTE — TREATMENT PLAN
TREATMENT PLAN REVIEW - [facility] BRYNN Gonzalez 77 y o  1955 female MRN: [id number]    [facility]  Room / Bed: [place] [address] Encounter: [id number]          Admit Date/Time:  6/15/2021  4:13 PM    Treatment Team: Attending Provider: Anum Mejia MD; Consulting Physician: Alpa Calderon MD; Patient Care Technician: Kali Walton; Recreational Therapist: Trang Burns; : GAURI Estevez; Patient Care Assistant: Laura Guevara; Certified Nursing Assistant: Sheridan Rangel;  Care Manager: Amanda Lan, RN; Registered Nurse: Gay Carlson RN; Patient Care Assistant: Desire Buford Gottron    Diagnosis: Principal Problem:    MDD (major depressive disorder), recurrent, severe, with psychosis ([facility]  Active Problems:    Essential hypertension    Chronic hand pain, right    Chronic low back pain    Osteoarthritis of lumbar spine with myelopathy    Vitamin D deficiency    CAD (coronary artery disease)    UTI (urinary tract infection)      Patient Strengths/Assets: cooperative, negotiates basic needs, patient is on a voluntary commitment    Patient Barriers/Limitations: difficulty adapting, poor physical health, self-care deficit    Short Term Goals: decrease in depressive symptoms, decrease in anxiety symptoms, decrease in paranoid thoughts, decrease in level of agitation, ability to stay safe on the unit, ability to stay free of restraints, improvement in reasoning ability, improvement in self care, sleep improvement, improvement in appetite, mood stabilization, acceptance of need for psychiatric treatment, acceptance of psychiatric medications    Long Term Goals: improvement in depression, improvement in anxiety, stabilization of mood, free of suicidal thoughts, improvement in reality testing, improved insight, able to express basic needs, acceptance of need for psychiatric medications, acceptance of need for psychiatric treatment, acceptance of psychiatric diagnosis, adequate self care, adequate sleep, adequate appetite, appropriate interaction with peers    Progress Towards Goals: starting psychiatric medications as prescribed    Recommended Treatment: medication management, patient medication education, group therapy, milieu therapy, continued Behavioral Health psychiatric evaluation/assessment process, medical follow up with medical team    Treatment Frequency: daily medication monitoring, group and milieu therapy daily, monitoring through interdisciplinary rounds, monitoring through weekly patient care conferences    Expected Discharge Date: 13 midnights     Discharge Plan: will be determined    Treatment Plan Created/Updated By: Deborah Thompson MD

## 2021-06-16 NOTE — NURSING NOTE
Patient c/o having a nightmare and is unable to return to sleep  She requests PRN medication to help her with sleep  Administered PRN Trazodone 50mg as per order for insomnia  Will monitor for medication effectiveness

## 2021-06-16 NOTE — PROGRESS NOTES
06/16/21 1150   Team Meeting   Meeting Type Tx Team Meeting   Team Members Present   Team Members Present Physician;Nurse;;Occupational Therapist   Physician Team Member Dr Beatrice Gordon MD   Nursing Team Member Daisha Dillard, [facility]   Care Management Team Member Claudetta America, MS, [facility]   Patient/Family Present   Patient Present Yes   Patient's Family Present No   PT met with [address] [address] team, reviewed TX plan and goals, all in agreement and signed

## 2021-06-16 NOTE — PROGRESS NOTES
06/16/21    Team Meeting   Meeting Type Daily Rounds   Team Members Present   Team Members Present Physician;Nurse;;Occupational Therapist   Physician Team Member Dr Daniel Young MD; JOSE Telles   Nursing Team Member Brian Garcia RN   Care Management Team Member MS Renee, [facility]   OT Team Member [place], [place]   Patient/Family Present   Patient Present No   Patient's Family Present No   New admission  Readmission D/C last 6/10 from [address], homeless living with daughter  201  Readmit score 16  Depression and anxiety 10/10  VH at times of people she knows both living and dead  PRN  Nightmare  Broken sleep

## 2021-06-16 NOTE — NURSING NOTE
Patient complained of 10/10 generalized pain, restless anxiety, and difficulty sleeping  Gave 975 mg Tylenol and 1 mg Ativan as ordered for complaints of symptoms  Chappell scale is "25 " Will continue to monitor

## 2021-06-17 PROCEDURE — 99233 SBSQ HOSP IP/OBS HIGH 50: CPT | Performed by: PSYCHIATRY & NEUROLOGY

## 2021-06-17 RX ORDER — BUPROPION HYDROCHLORIDE 75 MG/1
75 TABLET ORAL DAILY
Status: DISCONTINUED | OUTPATIENT
Start: 2021-06-18 | End: 2021-06-18

## 2021-06-17 RX ORDER — PRAZOSIN HYDROCHLORIDE 1 MG/1
1 CAPSULE ORAL
Status: DISCONTINUED | OUTPATIENT
Start: 2021-06-17 | End: 2021-06-21

## 2021-06-17 RX ORDER — CEPHALEXIN 250 MG/1
500 CAPSULE ORAL EVERY 12 HOURS SCHEDULED
Status: COMPLETED | OUTPATIENT
Start: 2021-06-17 | End: 2021-06-26

## 2021-06-17 RX ORDER — LIDOCAINE 50 MG/G
1 PATCH TOPICAL DAILY
Status: DISCONTINUED | OUTPATIENT
Start: 2021-06-17 | End: 2021-06-19

## 2021-06-17 RX ADMIN — CEPHALEXIN 500 MG: 250 CAPSULE ORAL at 21:38

## 2021-06-17 RX ADMIN — ISOSORBIDE MONONITRATE 30 MG: 30 TABLET, EXTENDED RELEASE ORAL at 08:13

## 2021-06-17 RX ADMIN — GABAPENTIN 300 MG: 300 CAPSULE ORAL at 08:14

## 2021-06-17 RX ADMIN — ACETAMINOPHEN 975 MG: 325 TABLET ORAL at 07:40

## 2021-06-17 RX ADMIN — MELATONIN 3 MG: at 21:38

## 2021-06-17 RX ADMIN — PRAVASTATIN SODIUM 20 MG: 20 TABLET ORAL at 15:50

## 2021-06-17 RX ADMIN — OLANZAPINE 10 MG: 5 TABLET ORAL at 08:13

## 2021-06-17 RX ADMIN — BUPROPION HYDROCHLORIDE 150 MG: 150 TABLET, EXTENDED RELEASE ORAL at 08:13

## 2021-06-17 RX ADMIN — METOPROLOL TARTRATE 25 MG: 25 TABLET, FILM COATED ORAL at 08:13

## 2021-06-17 RX ADMIN — LORAZEPAM 0.5 MG: 0.5 TABLET ORAL at 21:01

## 2021-06-17 RX ADMIN — GABAPENTIN 300 MG: 300 CAPSULE ORAL at 15:50

## 2021-06-17 RX ADMIN — DICLOFENAC SODIUM 2 G: 10 GEL TOPICAL at 08:12

## 2021-06-17 RX ADMIN — APIXABAN 5 MG: 5 TABLET, FILM COATED ORAL at 08:13

## 2021-06-17 RX ADMIN — PHENYTOIN 1 MG: 125 SUSPENSION ORAL at 21:38

## 2021-06-17 RX ADMIN — CEPHALEXIN 500 MG: 250 CAPSULE ORAL at 08:13

## 2021-06-17 RX ADMIN — AMLODIPINE BESYLATE 5 MG: 5 TABLET ORAL at 08:13

## 2021-06-17 RX ADMIN — APIXABAN 5 MG: 5 TABLET, FILM COATED ORAL at 17:15

## 2021-06-17 RX ADMIN — DICLOFENAC SODIUM 2 G: 10 GEL TOPICAL at 11:26

## 2021-06-17 RX ADMIN — VENLAFAXINE HYDROCHLORIDE 150 MG: 150 CAPSULE, EXTENDED RELEASE ORAL at 08:13

## 2021-06-17 RX ADMIN — DICLOFENAC SODIUM 2 G: 10 GEL TOPICAL at 21:39

## 2021-06-17 RX ADMIN — GABAPENTIN 300 MG: 300 CAPSULE ORAL at 21:38

## 2021-06-17 RX ADMIN — DICLOFENAC SODIUM 2 G: 10 GEL TOPICAL at 17:15

## 2021-06-17 RX ADMIN — PANTOPRAZOLE SODIUM 40 MG: 40 TABLET, DELAYED RELEASE ORAL at 06:09

## 2021-06-17 RX ADMIN — LIDOCAINE 1 PATCH: 50 PATCH TOPICAL at 11:28

## 2021-06-17 NOTE — PROGRESS NOTES
Slept well during most q 7 minute safety checks  No respiratory distress or changes in medical condition  Sleep has been sound and undisturbed  No suicidal ideations noted  Safety maintained via clutter-free environment, ID band, and given non-skid socks  Will continue to monitor

## 2021-06-17 NOTE — PROGRESS NOTES
Progress Note - Behavioral Health   Abigail Simmons 77 y o  female MRN: [id number]  Unit/Bed#: [facility] 201-01 Encounter: [id number]    Assessment/Plan   Principal Problem:    MDD (major depressive disorder), recurrent, severe, with psychosis ([facility] )  Active Problems:    Essential hypertension    Chronic hand pain, right    Chronic low back pain    Osteoarthritis of lumbar spine with myelopathy    Vitamin D deficiency    CAD (coronary artery disease)    UTI (urinary tract infection)      Behavior over the last 24 hours:  unchanged  Sleep: poor, nightmares  Appetite: normal  Medication side effects: No  ROS: no complaints and all other systems negative for acute change     Derek Shipman was seen today for psychiatric follow-up  Patient presents as anxious and restless and reports I am not doing too good    She states that she left group due to having suicidal ideations and increasing anxiety  Patient was able to contract for safety on unit  She reports having a VH of a duck or a goose flying in the room  Also reports history of AH, with last being 3 days ago  AH are non commanding and patient describes as just voices    She went on to say that she has poor sleep and is afraid to sleep at night because of the nightmares I have    She describes her nightmares as seeing demons in close coming after her and reports having for the last 6 months  She identified losing her sister and friend over the last 6 months as a stressor in addition to her close friend being terminally ill    Patient was linear and logical in thought  She is in agreement to decrease Wellbutrin and add prazosin at HS an attempt to alleviate nightmares      Mental Status Evaluation:  Appearance:  disheveled   Behavior:  restless, cooperative   Speech:  normal pitch and normal volume   Mood:  anxious   Affect:  mood-congruent and redirectable   Thought Process:  goal directed, circumstantial   Thought Content:  no overt delusions   Perceptual Disturbances: Auditory hallucinations without commands and Visual hallucinations   Risk Potential: Suicidal Ideations yes, with no plan, able to contract for safety  Homicidal Ideations none  Potential for Aggression No   Sensorium:  person, place and time/date   Memory:  recent and remote memory grossly intact   Consciousness:  alert and awake    Attention: attention span and concentration were age appropriate   Insight:  limited   Judgment: limited   Gait/Station: normal gait/station and normal balance   Motor Activity: no abnormal movements     Progress Toward Goals:  No change  Will decrease Wellbutrin to 75mg QD due to suspicion of dopaminergic effects contributing to AVH  Will also add prazosin 1 mg at HS for nightmares and sleep disturbance  Otherwise, will continue with current psychotropic regimen  No discharge date at this time  Recommended Treatment: Continue with group therapy, milieu therapy and occupational therapy  Risks, benefits and possible side effects of Medications:   Risks, benefits, and possible side effects of medications explained to patient and patient verbalizes understanding  Medications: all current active meds have been reviewed and planned medication changes: Discontinue Wellbutrin XL 150mg QD  Start Wellbutrin 75mg PO QD  Start Prazosin 1mg PO QHS  Labs: I have personally reviewed all pertinent laboratory/tests results  Counseling / Coordination of Care  Total floor / unit time spent today 25 minutes  Greater than 50% of total time was spent with the patient and / or family counseling and / or coordination of care

## 2021-06-17 NOTE — NURSING NOTE
Patient social and out in the community  Denies any suicidal thoughts or hallucinations  Pleasant during assessment  Ongoing anxiety complaints continue  Compliant with medications and snacks  Maintained on q 7 minute checks  Will continue to monitor  Appears to be resting quietly in bed at present

## 2021-06-17 NOTE — PROGRESS NOTES
Pt medicated for generalized pain @ 0740 with Tylenol po as ordered by MD with mild relief obtained  Pt denies any homicidal ideations  Pt c/o visual hallucinations @ times & sees family & friends both living & dead  Pt c/o having 2 nightmares last night  Pt c/o to Dr Vo's student that she is having Passive SI to hang self, but feels safe here in the hospital  Pt c/o depression 6/10 & anxiety 6/10  Q 7 min checks maintained to monitor pt's behavior & safety  Pt is pleasant & socializes with other patients  Pt is cooperative &  compliant with medications  Pt up ad krish & gait is steady

## 2021-06-17 NOTE — PROGRESS NOTES
06/17/21    Team Meeting   Meeting Type Daily Rounds   Team Members Present   Team Members Present Physician;Nurse;Occupational Therapist;   Physician Team Member Dr Maura Pereira MD; JOSE Guerrero   Nursing Team Member Gary Barraza RN   Social Work Team Member [facility]   OT Team Member Sandy Forerst [place]   Patient/Family Present   Patient Present No   Patient's Family Present No     No DC date - will return home; c/o nightmares and VH last night; prns utilized for anxiety

## 2021-06-17 NOTE — PROGRESS NOTES
Progress Note - Aicha Pennington 77 y o  female MRN: [id number]    Unit/Bed#[facility] 201-01 Encounter: [id number]        Subjective:   Patient seen and examined at bedside after reviewing the chart and discussing the case with the caring staff  Patient examined at bedside  Patient is complaining of pain to her bilateral shoulders  Patient states that Voltaren gel relieves her back pain but did not help with the pain in the shoulder  On review of patient's labs it was found that patient's vitamin-D levels were low 19 1  Patient's vitamin B12 levels were in the low 116  I reviewed patient's urine cultures was found that there was no growth  Physical Exam   Vitals: Blood pressure 126/79, pulse 70, temperature 97 5 °F (36 4 °C), temperature source Temporal, resp  rate 18, height 5' 3" (1 6 m), weight 99 4 kg (219 lb 2 2 oz), SpO2 95 %  ,Body mass index is 38 82 kg/m²  Constitutional: He appears well-developed  HEENT: PERR, EOMI, MMM  Cardiovascular: Normal rate and regular rhythm  Pulmonary/Chest: Effort normal and breath sounds normal    Abdomen: Soft, + BS, NT    Assessment/Plan:  Aicha Pennington is a(n) 77y o  year old female with MDD with psychotic symptoms     1  Cardiac with history of hypertension, dyslipidemia, CHF, atrial fibrillation, coronary artery disease status post pacemaker placement  Patient will be continued on metoprolol 25 mg b i d , amlodipine 5 mg daily, Imdur 30 mg daily, Pravachol 20 mg daily and Eliquis 5 mg 2 times daily  2  Neuropathy  Patient is on gabapentin 200 mg 4 times daily  3  GERD  Patient is on Protonix 40 mg daily  4  DJD/osteoarthritis  Patient may continue to receive Tylenol on as needed basis  I will also put the patient on Voltaren gel to be applied over the affected area  I will put the patient on Lidoderm patch for shoulder pain  5  Gait abnormality  I will consult PT OT for further evaluation and treatment  6  Acute UTI   Patient's urine culture is negative for UTI I will discontinue the antibiotics  7  New vitamin-D deficiency  I will put the patient on vitamin-D bolus doses for 8 weeks followed by vitamin D3 1000 units daily  8  New vitamin B12 deficiency  I will put the patient on monthly B12 injections  The patient was discussed with Dr Sirisha Manning and he is in agreement with the above note

## 2021-06-18 PROCEDURE — 99232 SBSQ HOSP IP/OBS MODERATE 35: CPT | Performed by: NURSE PRACTITIONER

## 2021-06-18 RX ORDER — BUPROPION HYDROCHLORIDE 100 MG/1
50 TABLET ORAL DAILY
Status: COMPLETED | OUTPATIENT
Start: 2021-06-19 | End: 2021-06-19

## 2021-06-18 RX ADMIN — OLANZAPINE 10 MG: 5 TABLET ORAL at 08:23

## 2021-06-18 RX ADMIN — PRAVASTATIN SODIUM 20 MG: 20 TABLET ORAL at 15:56

## 2021-06-18 RX ADMIN — LIDOCAINE 1 PATCH: 50 PATCH TOPICAL at 08:25

## 2021-06-18 RX ADMIN — GABAPENTIN 300 MG: 300 CAPSULE ORAL at 21:26

## 2021-06-18 RX ADMIN — LORAZEPAM 1 MG: 1 TABLET ORAL at 11:40

## 2021-06-18 RX ADMIN — CEPHALEXIN 500 MG: 250 CAPSULE ORAL at 21:26

## 2021-06-18 RX ADMIN — CEPHALEXIN 500 MG: 250 CAPSULE ORAL at 08:23

## 2021-06-18 RX ADMIN — HYDROXYZINE HYDROCHLORIDE 25 MG: 25 TABLET, FILM COATED ORAL at 02:23

## 2021-06-18 RX ADMIN — METOPROLOL TARTRATE 25 MG: 25 TABLET, FILM COATED ORAL at 08:23

## 2021-06-18 RX ADMIN — ALUMINUM HYDROXIDE, MAGNESIUM HYDROXIDE, AND SIMETHICONE 30 ML: 200; 200; 20 SUSPENSION ORAL at 09:11

## 2021-06-18 RX ADMIN — LORAZEPAM 0.5 MG: 0.5 TABLET ORAL at 17:48

## 2021-06-18 RX ADMIN — APIXABAN 5 MG: 5 TABLET, FILM COATED ORAL at 08:23

## 2021-06-18 RX ADMIN — ACETAMINOPHEN 975 MG: 325 TABLET ORAL at 04:38

## 2021-06-18 RX ADMIN — DICLOFENAC SODIUM 2 G: 10 GEL TOPICAL at 21:29

## 2021-06-18 RX ADMIN — LORAZEPAM 1 MG: 1 TABLET ORAL at 04:38

## 2021-06-18 RX ADMIN — AMLODIPINE BESYLATE 5 MG: 5 TABLET ORAL at 08:23

## 2021-06-18 RX ADMIN — APIXABAN 5 MG: 5 TABLET, FILM COATED ORAL at 17:48

## 2021-06-18 RX ADMIN — DICLOFENAC SODIUM 2 G: 10 GEL TOPICAL at 11:40

## 2021-06-18 RX ADMIN — BUPROPION HYDROCHLORIDE 75 MG: 75 TABLET, FILM COATED ORAL at 08:23

## 2021-06-18 RX ADMIN — GABAPENTIN 300 MG: 300 CAPSULE ORAL at 08:23

## 2021-06-18 RX ADMIN — GABAPENTIN 300 MG: 300 CAPSULE ORAL at 15:56

## 2021-06-18 RX ADMIN — METOPROLOL TARTRATE 25 MG: 25 TABLET, FILM COATED ORAL at 21:26

## 2021-06-18 RX ADMIN — PHENYTOIN 1 MG: 125 SUSPENSION ORAL at 21:26

## 2021-06-18 RX ADMIN — VENLAFAXINE HYDROCHLORIDE 150 MG: 150 CAPSULE, EXTENDED RELEASE ORAL at 08:23

## 2021-06-18 RX ADMIN — ISOSORBIDE MONONITRATE 30 MG: 30 TABLET, EXTENDED RELEASE ORAL at 08:23

## 2021-06-18 RX ADMIN — PANTOPRAZOLE SODIUM 40 MG: 40 TABLET, DELAYED RELEASE ORAL at 04:39

## 2021-06-18 RX ADMIN — DICLOFENAC SODIUM 2 G: 10 GEL TOPICAL at 17:49

## 2021-06-18 RX ADMIN — DICLOFENAC SODIUM 2 G: 10 GEL TOPICAL at 08:26

## 2021-06-18 RX ADMIN — MELATONIN 3 MG: at 21:26

## 2021-06-18 NOTE — NURSING NOTE
Patient visible in the community  She frequently walks in hallways, sits in dining area w/ peers  She is minimally social  Her appetite is good- 100% of meals  Compliant with medications  She appears flat and depressed  Somatic  She complaints of severe anxiety rating it a 10/10- Chappell score of 33  PRN Ativan 1 mg administered at 1140- effective  She currently denies SI/HI  Some confusion noted  C/o GI upset  Will CTM  Q7 minute safety checks in progress

## 2021-06-18 NOTE — PROGRESS NOTES
Awake and to nursing station, complaining of severe anxiety and #8 right shoulder pain  Stated she had a nightmare  Given Ativan 1 mg PO at 0338  Chappell Scale 29  Tylenol 975 given for stated pain  Stated she had no further "A-Fib" symptoms  Requested to sit in a chair in hallway till she calmed down  Allowed to do so  Assurance and encouragement given  Sleep has been broken  Maintained on q 7 minute checks and in camera view for safety

## 2021-06-18 NOTE — PLAN OF CARE
Problem: Alteration in Thoughts and Perception  Goal: Treatment Goal: Gain control of psychotic behaviors/thinking, reduce/eliminate presenting symptoms and demonstrate improved reality functioning upon discharge  Outcome: Progressing  Goal: Refrain from acting on delusional thinking/internal stimuli  Description: Interventions:  - Monitor patient closely, per order   - Utilize least restrictive measures   - Set reasonable limits, give positive feedback for acceptable   - Administer medications as ordered and monitor of potential side effects  Outcome: Progressing  Goal: Agree to be compliant with medication regime, as prescribed and report medication side effects  Description: Interventions:  - Offer appropriate PRN medication and supervise ingestion; conduct AIMS, as needed   Outcome: Progressing     Problem: Ineffective Coping  Goal: Cooperates with admission process  Description: Interventions:   - Complete admission process  Outcome: Progressing  Goal: Identifies healthy coping skills  Outcome: Progressing  Goal: Participates in unit activities  Description: Interventions:  - Provide therapeutic environment   - Provide required programming   - Redirect inappropriate behaviors   Outcome: Progressing  Goal: Patient/Family participate in treatment and DC plans  Description: Interventions:  - Provide therapeutic environment  Outcome: Progressing     Problem: Depression  Goal: Treatment Goal: Demonstrate behavioral control of depressive symptoms, verbalize feelings of improved mood/affect, and adopt new coping skills prior to discharge  Outcome: Progressing  Goal: Refrain from harming self  Description: Interventions:  - Monitor patient closely, per order   - Supervise medication ingestion, monitor effects and side effects   Outcome: Progressing  Goal: Refrain from isolation  Description: Interventions:  - Develop a trusting relationship   - Encourage socialization   Outcome: Progressing  Goal: Refrain from self-neglect  Outcome: Progressing  Goal: Complete daily ADLs, including personal hygiene independently, as able  Description: Interventions:  - Observe, teach, and assist patient with ADLS  -  Monitor and promote a balance of rest/activity, with adequate nutrition and elimination   Outcome: Progressing     Problem: Anxiety  Goal: Anxiety is at manageable level  Description: Interventions:  - Assess and monitor patient's anxiety level  - Monitor for signs and symptoms (heart palpitations, chest pain, shortness of breath, headaches, nausea, feeling jumpy, restlessness, irritable, apprehensive)  - Collaborate with interdisciplinary team and initiate plan and interventions as ordered  - [place] patient to unit/surroundings  - Explain treatment plan  - Encourage participation in care  - Encourage verbalization of concerns/fears  - Identify coping mechanisms  - Assist in developing anxiety-reducing skills  - Administer/offer alternative therapies  - Limit or eliminate stimulants  Outcome: Progressing     Problem: Nutrition/Hydration-ADULT  Goal: Nutrient/Hydration intake appropriate for improving, restoring or maintaining nutritional needs  Description: Monitor and assess patient's nutrition/hydration status for malnutrition  Collaborate with interdisciplinary team and initiate plan and interventions as ordered  Monitor patient's weight and dietary intake as ordered or per policy  Utilize nutrition screening tool and intervene as necessary  Determine patient's food preferences and provide high-protein, high-caloric foods as appropriate       INTERVENTIONS:  - Monitor oral intake, urinary output, labs, and treatment plans  - Assess nutrition and hydration status and recommend course of action  - Evaluate amount of meals eaten  - Assist patient with eating if necessary   - Allow adequate time for meals  - Recommend/ encourage appropriate diets, oral nutritional supplements, and vitamin/mineral supplements  - Order, calculate, and assess calorie counts as needed  - Recommend, monitor, and adjust tube feedings and TPN/PPN based on assessed needs  - Assess need for intravenous fluids  - Provide specific nutrition/hydration education as appropriate  - Include patient/family/caregiver in decisions related to nutrition  Outcome: Progressing

## 2021-06-18 NOTE — PROGRESS NOTES
Progress Note - Richardson Lawrence 77 y o  female MRN: [id number]    Unit/Bed#[facility]-01 Encounter: [id number]        Subjective:   Patient seen and examined at bedside after reviewing the chart and discussing the case with the caring staff  Patient examined at bedside  Patient states that the Lidoderm patch is helping her shoulder pain and Voltaren gel is working for her back pain  Physical Exam   Vitals: Blood pressure 170/74, pulse 65, temperature 98 °F (36 7 °C), temperature source Temporal, resp  rate 18, height 5' 3" (1 6 m), weight 99 4 kg (219 lb 2 2 oz), SpO2 97 %  ,Body mass index is 38 82 kg/m²  Constitutional: He appears well-developed  HEENT: PERR, EOMI, MMM  Cardiovascular: Normal rate and regular rhythm  Pulmonary/Chest: Effort normal and breath sounds normal    Abdomen: Soft, + BS, NT    Assessment/Plan:  Richardson Lawrence is a(n) 77y o  year old female with MDD with psychotic symptoms     1  Cardiac with history of hypertension, dyslipidemia, CHF, atrial fibrillation, coronary artery disease status post pacemaker placement  Patient will be continued on metoprolol 25 mg b i d , amlodipine 5 mg daily, Imdur 30 mg daily, Pravachol 20 mg daily and Eliquis 5 mg 2 times daily  2  Neuropathy  Patient is on gabapentin 200 mg 4 times daily  3  GERD  Patient is on Protonix 40 mg daily  4  DJD/osteoarthritis  Patient may continue to receive Tylenol on as needed basis  I will also put the patient on Voltaren gel to be applied over the affected area  I will put the patient on Lidoderm patch for shoulder pain  5  Gait abnormality  I will consult PT OT for further evaluation and treatment  6  Acute UTI  Patient's urine culture is negative for UTI I will discontinue the antibiotics  7  New vitamin-D deficiency  I will put the patient on vitamin-D bolus doses for 8 weeks followed by vitamin D3 1000 units daily  8  New vitamin B12 deficiency    I will put the patient on monthly B12 injections  The patient was discussed with Dr Jamaica Malave and he is in agreement with the above note

## 2021-06-18 NOTE — NURSING NOTE
Patient is awake at this time  She c/o having a nightmare and is feeling anxious  She informs she feels like her heart is beating irregularly  This writer auscultated heart sounds with heart sounding regular  She informs she has a pacemaker in place  She denies dizziness, lightheadedness, chest pain and SOB  No obvious signs of distress or discomfort observed  She states, "I think it's just my anxiety " Performed a Chappell Scale rating with a result of 14  Administered PRN Atarax as per order for mild anxiety  Primary nurse made aware  Will monitor for medication effectiveness  Q7 minute safety checks in progress

## 2021-06-18 NOTE — PROGRESS NOTES
Controlled and visible in the community  Positive for medications and snacks  Denies any suicidal or homicidal ideations  No behavioral issues  Stated she had moderate Anxiety at 2030  RN was told he'd get an antianxiety medication in a few moments  After 15 minute stated she thinks she was having Afib, but then said it was just anxiety  Given Ativan 0 5 mg at 2103 for anxiety  Chappell Scale 24  No further complaints after medication  Medication effective  No overt change in medical condition  Maintained on q 7 minute checks  No aspiration risks noted  Will continue to monitor

## 2021-06-18 NOTE — PROGRESS NOTES
06/18/21    Team Meeting   Meeting Type Daily Rounds   Team Members Present   Team Members Present Physician;Nurse;;Occupational Therapist   Physician Team Member Dr Anup Ray MD; JOSE Chaudhary   Nursing Team Member Harvey Pizarro, [facility]   Care Management Team Member Keaton Merida, MS, [facility], [address] [facility]   OT Team Member Mica Liu, [place]   Patient/Family Present   Patient Present No   Patient's Family Present No   Complains of not feeling well, broken sleep, multiple prns  Nightmares, upset/frieghtened  Hallucinating on dayshift yesterday  Somatic  SI to hang self  Needs placement

## 2021-06-18 NOTE — PROGRESS NOTES
Progress Note - Behavioral Health   Jay Herron 77 y o  female MRN: [id number]  Unit/Bed#: [facility] 201-01 Encounter: [id number]    Assessment/Plan   Principal Problem:    MDD (major depressive disorder), recurrent, severe, with psychosis ([address] )  Active Problems:    Essential hypertension    Chronic hand pain, right    Chronic low back pain    Osteoarthritis of lumbar spine with myelopathy    Vitamin D deficiency    CAD (coronary artery disease)    UTI (urinary tract infection)      Behavior over the last 24 hours:  unchanged  Sleep:  Intermittent  Appetite: normal  Medication side effects: No  ROS: no complaints and All other systems negative for acute change     Juan Pablo Oconnor was seen today for psychiatric follow-up  Reports increased anxiety and depression;  rates 8/10  She feels she is adjusting well to unit, however had difficulty sleeping again last night  She reports she woke up 3 times due to nightmares  She was started on the prazosin at HS last night and will continue for nightmares  VH persist; reported seeing a rabbit jump out of the wall and run off  She remains focused on her recent losses and her friend's terminal illness  Today Juan Pablo Oconnor denies any suicidal ideations and states she feels safe  She has been compliant with medications and meals while on the unit and is often visible in the milieu      Mental Status Evaluation:  Appearance:  age appropriate, casually dressed and overweight   Behavior:  Cooperative   Speech:  normal pitch and normal volume   Mood:  anxious   Affect:  mood-congruent and redirectable   Thought Process:  circumstantial   Thought Content:  No overt delusions   Perceptual Disturbances: Visual hallucinations, intermittent, people and rabbits   Risk Potential: Suicidal Ideations none  Homicidal Ideations none  Potential for Aggression No   Sensorium:  person, place, time/date and situation   Memory:  recent memory mildly impaired   Consciousness:  alert and awake    Attention: attention span and concentration were age appropriate   Insight:  limited   Judgment: limited   Gait/Station: normal gait/station and normal balance   Motor Activity: no abnormal movements     Progress Toward Goals:  Patient continues to endorse poor sleep with nightmares, intermittent visual hallucinations, and episodes of increased anxiety  Will decrease Wellbutrin to 50mg QD x 1 day then discontinue due to suspicion of dopaminergic effects contributing to visual hallucinations  Prazosin added to HS regimen last night for nightmares  Will continue to monitor and plan to maximize Effexor for anxiety/depression after discontinuation of Wellbutrin  No discharge date at this time    Recommended Treatment: Continue with group therapy, milieu therapy and occupational therapy  Risks, benefits and possible side effects of Medications:   Risks, benefits, and possible side effects of medications explained to patient and patient verbalizes understanding  Medications: all current active meds have been reviewed and planned medication changes: Decrease Wellbutrin to 50 mg QD x 1 day, then discontinue  Labs: I have personally reviewed all pertinent laboratory/tests results  Counseling / Coordination of Care  Total floor / unit time spent today 25 minutes  Greater than 50% of total time was spent with the patient and / or family counseling and / or coordination of care

## 2021-06-18 NOTE — PROGRESS NOTES
06/18/21 [address]   Patient Information   Mental Status Alert   Primary Caregiver Self   Support System Immediate family   Quaker/Cultural Requests Rastafari   Legal Information   Tx Plan Signed Yes   Current Status: 201   Legal Issues denies current and hx   Advance Directives   (none)   Advance Directives Status Discussed - Patient/Family Declined   Activities of Daily Living Prior to Admission   Functional Status Independent   Assistive Device No device needed   Living Arrangement Homeless  (couch surfing)   Ambulation Independent   Access to Firearms   Access to Firearms No   Income [address] SSI/SSD  ($[id number] /SDI)   Means of Transportation   Means of Transport to Appts: Family

## 2021-06-18 NOTE — PROGRESS NOTES
Patient OOB walking around unit and social with select peers  Compliant with meds and meals  Appetite good  C/O anxiety as she referenced her friend who recently passed away  Did not rate her anxiety  [place] 14  0 5 Ativan given after dinner, patient stated " this is the smallest ativan I have ever seen"  Affect is flat  Denies SI and HI  Denied nausea  Voltaren gel given as ordered to back and shoulder for chronic pain  Q 7 minute safety checks maintained  Will continue to monitor
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
## 2021-06-18 NOTE — PLAN OF CARE
Problem: DISCHARGE PLANNING - CARE MANAGEMENT  Goal: Discharge to post-acute care or home with appropriate resources  Description: INTERVENTIONS:  - Conduct assessment to determine patient/family and health care team treatment goals, and need for post-acute services based on payer coverage, community resources, and patient preferences, and barriers to discharge  - Address psychosocial, clinical, and financial barriers to discharge as identified in assessment in conjunction with the patient/family and health care team  - Arrange appropriate level of post-acute services according to patients   needs and preference and payer coverage in collaboration with the physician and health care team  - Communicate with and update the patient/family, physician, and health care team regarding progress on the discharge plan  - Arrange appropriate transportation to post-acute venues  Outcome: Progressing     Pt progressing;  No DC date - [facility] placement upon stabilization

## 2021-06-19 PROCEDURE — 99232 SBSQ HOSP IP/OBS MODERATE 35: CPT | Performed by: PSYCHIATRY & NEUROLOGY

## 2021-06-19 RX ORDER — LIDOCAINE 50 MG/G
1 PATCH TOPICAL DAILY
Status: DISCONTINUED | OUTPATIENT
Start: 2021-06-20 | End: 2021-06-28

## 2021-06-19 RX ORDER — ONDANSETRON 4 MG/1
4 TABLET, ORALLY DISINTEGRATING ORAL EVERY 6 HOURS PRN
Status: DISCONTINUED | OUTPATIENT
Start: 2021-06-19 | End: 2021-07-22 | Stop reason: HOSPADM

## 2021-06-19 RX ADMIN — DICLOFENAC SODIUM 2 G: 10 GEL TOPICAL at 11:49

## 2021-06-19 RX ADMIN — TRAZODONE HYDROCHLORIDE 50 MG: 50 TABLET ORAL at 01:27

## 2021-06-19 RX ADMIN — CEPHALEXIN 500 MG: 250 CAPSULE ORAL at 21:57

## 2021-06-19 RX ADMIN — PANTOPRAZOLE SODIUM 40 MG: 40 TABLET, DELAYED RELEASE ORAL at 05:56

## 2021-06-19 RX ADMIN — VENLAFAXINE HYDROCHLORIDE 150 MG: 150 CAPSULE, EXTENDED RELEASE ORAL at 09:04

## 2021-06-19 RX ADMIN — LIDOCAINE 1 PATCH: 50 PATCH TOPICAL at 08:58

## 2021-06-19 RX ADMIN — GABAPENTIN 300 MG: 300 CAPSULE ORAL at 09:04

## 2021-06-19 RX ADMIN — DICLOFENAC SODIUM 2 G: 10 GEL TOPICAL at 22:01

## 2021-06-19 RX ADMIN — MELATONIN 3 MG: at 21:57

## 2021-06-19 RX ADMIN — METOPROLOL TARTRATE 25 MG: 25 TABLET, FILM COATED ORAL at 09:04

## 2021-06-19 RX ADMIN — CEPHALEXIN 500 MG: 250 CAPSULE ORAL at 09:04

## 2021-06-19 RX ADMIN — LORAZEPAM 0.5 MG: 0.5 TABLET ORAL at 10:04

## 2021-06-19 RX ADMIN — BUPROPION HYDROCHLORIDE 50 MG: 100 TABLET, FILM COATED ORAL at 09:03

## 2021-06-19 RX ADMIN — PRAVASTATIN SODIUM 20 MG: 20 TABLET ORAL at 15:51

## 2021-06-19 RX ADMIN — GABAPENTIN 300 MG: 300 CAPSULE ORAL at 21:57

## 2021-06-19 RX ADMIN — GABAPENTIN 300 MG: 300 CAPSULE ORAL at 15:51

## 2021-06-19 RX ADMIN — APIXABAN 5 MG: 5 TABLET, FILM COATED ORAL at 17:12

## 2021-06-19 RX ADMIN — ISOSORBIDE MONONITRATE 30 MG: 30 TABLET, EXTENDED RELEASE ORAL at 09:04

## 2021-06-19 RX ADMIN — OLANZAPINE 10 MG: 5 TABLET ORAL at 09:02

## 2021-06-19 RX ADMIN — DICLOFENAC SODIUM 2 G: 10 GEL TOPICAL at 17:14

## 2021-06-19 RX ADMIN — AMLODIPINE BESYLATE 5 MG: 5 TABLET ORAL at 09:02

## 2021-06-19 RX ADMIN — METOPROLOL TARTRATE 25 MG: 25 TABLET, FILM COATED ORAL at 21:57

## 2021-06-19 RX ADMIN — APIXABAN 5 MG: 5 TABLET, FILM COATED ORAL at 09:04

## 2021-06-19 RX ADMIN — PHENYTOIN 1 MG: 125 SUSPENSION ORAL at 21:57

## 2021-06-19 NOTE — PROGRESS NOTES
Progress Note - Gattis Phalen 77 y o  female MRN: [id number]    Unit/Bed#[facility]aley 201-01 Encounter: [id number]        Subjective:   Patient seen and examined at bedside after reviewing the chart and discussing the case with the caring staff  Patient examined at bedside  Patient states that the Lidoderm patch is helping her shoulder pain and is requesting another patch for her lower back pain  Physical Exam   Vitals: Blood pressure 153/67, pulse 65, temperature (!) 97 3 °F (36 3 °C), temperature source Temporal, resp  rate 18, height 5' 3" (1 6 m), weight 99 4 kg (219 lb 2 2 oz), SpO2 96 %  ,Body mass index is 38 82 kg/m²  Constitutional: He appears well-developed  HEENT: PERR, EOMI, MMM  Cardiovascular: Normal rate and regular rhythm  Pulmonary/Chest: Effort normal and breath sounds normal    Abdomen: Soft, + BS, NT    Assessment/Plan:  Gattis Phalen is a(n) 77y o  year old female with MDD with psychotic symptoms     1  Cardiac with history of hypertension, dyslipidemia, CHF, atrial fibrillation, coronary artery disease status post pacemaker placement  Patient will be continued on metoprolol 25 mg b i d , amlodipine 5 mg daily, Imdur 30 mg daily, Pravachol 20 mg daily and Eliquis 5 mg 2 times daily  2  Neuropathy  Patient is on gabapentin 200 mg 4 times daily  3  GERD  Patient is on Protonix 40 mg daily  4  DJD/osteoarthritis  Patient may continue to receive Tylenol on as needed basis  I will also put the patient on Voltaren gel to be applied over the affected area  I will put the patient on Lidoderm patch for shoulder pain and lower back pain  5  Gait abnormality  I will consult PT OT for further evaluation and treatment  6  Acute UTI  Patient's urine culture is negative for UTI I will discontinue the antibiotics  7  New vitamin-D deficiency  I will put the patient on vitamin-D bolus doses for 8 weeks followed by vitamin D3 1000 units daily  8  New vitamin B12 deficiency    I will put the patient on monthly B12 injections  The patient was discussed with Dr Carol Ann Orourke and he is in agreement with the above note

## 2021-06-19 NOTE — PROGRESS NOTES
Patient visible on the unit  Pleasant and cooperative  Social with peers  Denies SI, HI, hallucinations, and depression  Anxiety controlled  Compliant with meals and medications  Able to make needs known  Q 7 minute checks maintained  Will continue to monitor and access

## 2021-06-19 NOTE — NURSING NOTE
Pt present in milieu and social with peers  Pt states her nightmares were not so bad last night  Pt endorse visual hallucinations and states  " I talk to them and have a full conversation " Pt rates anxiety 5 this morning then requested ativan for increased anxiety later on  Prn ativan 0 5 mg given  Pt requested 30 minutes later for more ativan  Explained to pt about prn usage and frequency  Pt verbally understands  Pt denies SI/HI  Pt requested a walker for further stabilization  Pt appears stable on feet while ambuating halls prior to walker provided  Pt rates depression 9/10 due to the possible losses she may endure  Pt rates back, right shoulder, and bilateral hand pain 5/10  Lidoderm patch applied to right lower back and right shoulder  Q 7 min safety checks maintained

## 2021-06-19 NOTE — PROGRESS NOTES
Patient visible in milieu, pleasant and cooperative in interaction, social with staff and peers  Patient endorses anxiety and depression due to environmental stimuli, support provided  Denies SI/HI, hallucinations  Remains medication compliant and on 7" checks for safety and behaviors

## 2021-06-19 NOTE — NURSING NOTE
Assumed care of this patient from prior shift nurse at [phone]  PRN Trazodone given by prior shift at 0126 appears to have been effective as patient has been in bed sleeping  No further complaints voiced  No acute behaviors noted  She remains in bed sleeping at the current time  Will CTM via q7 minute safety checks

## 2021-06-19 NOTE — PROGRESS NOTES
Progress Note - Behavioral Health   Reema Urena 77 y o  female MRN: [id number]  Unit/Bed#: [facility] Butler 201-01 Encounter: [id number]    Assessment/Plan   Principal Problem:    MDD (major depressive disorder), recurrent, severe, with psychosis ([facility] [address] )  Active Problems:    Essential hypertension    Chronic hand pain, right    Chronic low back pain    Osteoarthritis of lumbar spine with myelopathy    Vitamin D deficiency    CAD (coronary artery disease)    UTI (urinary tract infection)      Behavior over the last 24 hours:  unchanged  Sleep: normal  Appetite: normal  Medication side effects: No  ROS: no complaints    Mental Status Evaluation:  Appearance:  casually dressed   Behavior:  psychomotor retardation   Speech:  soft   Mood:  depressed   Affect:  constricted   Thought Process:  goal directed   Thought Content:  obsessions   Perceptual Disturbances: None   Risk Potential: Suicidal Ideations none  Homicidal Ideations none  Potential for Aggression No   Sensorium:  person, place and time/date   Memory:  recent and remote memory grossly intact   Consciousness:  awake    Attention: attention span and concentration were age appropriate   Insight:  limited   Judgment: limited   Gait/Station: slow   Motor Activity: no abnormal movements     Progress Toward Goals: Pt seen this morning in individual session,quite somatic with multiple complaints including back pain,requesting to have Advil 600 mg  Pt reports a modest improvement in her mood ,she is trying to stay positive and is engaged in the unit milue  Staff report no behavioral issues  Recommended Treatment:   1-Cont current treatment  2-Continue with group therapy, milieu therapy and occupational therapy  Risks, benefits and possible side effects of Medications:   Risks, benefits, and possible side effects of medications explained to patient and patient verbalizes understanding        Medications:   current meds:   Current Facility-Administered Medications Medication Dose Route Frequency    acetaminophen (TYLENOL) tablet 650 mg  650 mg Oral Q4H PRN    acetaminophen (TYLENOL) tablet 650 mg  650 mg Oral Q4H PRN    acetaminophen (TYLENOL) tablet 975 mg  975 mg Oral Q6H PRN    aluminum-magnesium hydroxide-simethicone (MYLANTA) oral suspension 30 mL  30 mL Oral Q4H PRN    amLODIPine (NORVASC) tablet 5 mg  5 mg Oral Daily    apixaban (ELIQUIS) tablet 5 mg  5 mg Oral BID    cephalexin (KEFLEX) capsule 500 mg  500 mg Oral Q12H [facility]    [START ON 8/4/2021] cholecalciferol (VITAMIN D3) tablet 1,000 Units  1,000 Units Oral Daily    Diclofenac Sodium (VOLTAREN) 1 % topical gel 2 g  2 g Topical 4x Daily    ergocalciferol (VITAMIN D2) capsule 50,000 Units  50,000 Units Oral Weekly    gabapentin (NEURONTIN) capsule 300 mg  300 mg Oral TID    hydrOXYzine HCL (ATARAX) tablet 25 mg  25 mg Oral Q6H PRN Max 4/day    isosorbide mononitrate (IMDUR) 24 hr tablet 30 mg  30 mg Oral Daily    lidocaine (LIDODERM) 5 % patch 1 patch  1 patch Topical Daily    LORazepam (ATIVAN) injection 1 mg  1 mg Intramuscular Q6H PRN Max 3/day    LORazepam (ATIVAN) tablet 0 5 mg  0 5 mg Oral Q6H PRN Max 4/day    LORazepam (ATIVAN) tablet 1 mg  1 mg Oral Q6H PRN Max 3/day    melatonin tablet 3 mg  3 mg Oral HS    metoprolol tartrate (LOPRESSOR) tablet 25 mg  25 mg Oral Q12H ALISA    OLANZapine (ZyPREXA) IM injection 5 mg  5 mg Intramuscular Q6H PRN Max 4/day    OLANZapine (ZyPREXA) tablet 10 mg  10 mg Oral Daily    OLANZapine (ZyPREXA) tablet 2 5 mg  2 5 mg Oral Q6H PRN Max 4/day    OLANZapine (ZyPREXA) tablet 5 mg  5 mg Oral Q6H PRN Max 4/day    pantoprazole (PROTONIX) EC tablet 40 mg  40 mg Oral Early Morning    polyethylene glycol (MIRALAX) packet 17 g  17 g Oral Daily PRN    pravastatin (PRAVACHOL) tablet 20 mg  20 mg Oral Daily With Dinner    prazosin (MINIPRESS) capsule 1 mg  1 mg Oral HS    risperiDONE (RisperDAL M-TAB) disintegrating tablet 0 5 mg  0 5 mg Oral Q6H PRN Max 4/day  senna-docusate sodium (SENOKOT S) 8 6-50 mg per tablet 1 tablet  1 tablet Oral Daily PRN    traZODone (DESYREL) tablet 50 mg  50 mg Oral HS PRN    venlafaxine (EFFEXOR-XR) 24 hr capsule 150 mg  150 mg Oral Daily     Labs: I have personally reviewed all pertinent laboratory/tests results  Last Laboratory Results with Depakote and/or Tegretol levels:   Lab Results   Component Value Date    WBC 10 93 (H) 06/13/2021    RBC 3 91 06/13/2021    HGB 11 7 06/13/2021    HCT 38 1 06/13/2021     06/13/2021    RDW 13 5 06/13/2021    NEUTROABS 7 13 06/13/2021    SODIUM 147 (H) 06/13/2021    K 5 1 06/13/2021     (H) 06/13/2021    CO2 31 06/13/2021    BUN 24 06/13/2021    CREATININE 1 10 06/13/2021    GLUC 77 06/13/2021    CALCIUM 9 0 06/13/2021    AST 24 06/13/2021    ALT 36 06/13/2021    ALKPHOS 145 (H) 06/13/2021    TP 6 7 06/13/2021    ALB 3 2 (L) 06/13/2021    TBILI 0 20 06/13/2021       Counseling / Coordination of Care  Total floor / unit time spent today 25 minutes  Greater than 50% of total time was spent with the patient and / or family counseling and / or coordination of care   A description of the counseling / coordination of care:

## 2021-06-20 PROCEDURE — 99232 SBSQ HOSP IP/OBS MODERATE 35: CPT | Performed by: PSYCHIATRY & NEUROLOGY

## 2021-06-20 RX ADMIN — HYDROXYZINE HYDROCHLORIDE 25 MG: 25 TABLET, FILM COATED ORAL at 16:20

## 2021-06-20 RX ADMIN — GABAPENTIN 300 MG: 300 CAPSULE ORAL at 16:17

## 2021-06-20 RX ADMIN — CEPHALEXIN 500 MG: 250 CAPSULE ORAL at 21:52

## 2021-06-20 RX ADMIN — GABAPENTIN 300 MG: 300 CAPSULE ORAL at 21:52

## 2021-06-20 RX ADMIN — METOPROLOL TARTRATE 25 MG: 25 TABLET, FILM COATED ORAL at 08:02

## 2021-06-20 RX ADMIN — METOPROLOL TARTRATE 25 MG: 25 TABLET, FILM COATED ORAL at 21:51

## 2021-06-20 RX ADMIN — LIDOCAINE 1 PATCH: 50 PATCH TOPICAL at 08:02

## 2021-06-20 RX ADMIN — VENLAFAXINE HYDROCHLORIDE 150 MG: 150 CAPSULE, EXTENDED RELEASE ORAL at 08:02

## 2021-06-20 RX ADMIN — ACETAMINOPHEN 975 MG: 325 TABLET ORAL at 18:14

## 2021-06-20 RX ADMIN — PANTOPRAZOLE SODIUM 40 MG: 40 TABLET, DELAYED RELEASE ORAL at 06:41

## 2021-06-20 RX ADMIN — LORAZEPAM 0.5 MG: 0.5 TABLET ORAL at 11:45

## 2021-06-20 RX ADMIN — APIXABAN 5 MG: 5 TABLET, FILM COATED ORAL at 08:01

## 2021-06-20 RX ADMIN — APIXABAN 5 MG: 5 TABLET, FILM COATED ORAL at 17:35

## 2021-06-20 RX ADMIN — TRAZODONE HYDROCHLORIDE 50 MG: 50 TABLET ORAL at 00:46

## 2021-06-20 RX ADMIN — DICLOFENAC SODIUM 2 G: 10 GEL TOPICAL at 17:39

## 2021-06-20 RX ADMIN — GABAPENTIN 300 MG: 300 CAPSULE ORAL at 08:01

## 2021-06-20 RX ADMIN — CEPHALEXIN 500 MG: 250 CAPSULE ORAL at 08:01

## 2021-06-20 RX ADMIN — OLANZAPINE 10 MG: 5 TABLET ORAL at 08:02

## 2021-06-20 RX ADMIN — ISOSORBIDE MONONITRATE 30 MG: 30 TABLET, EXTENDED RELEASE ORAL at 08:01

## 2021-06-20 RX ADMIN — DICLOFENAC SODIUM 2 G: 10 GEL TOPICAL at 08:01

## 2021-06-20 RX ADMIN — MELATONIN 3 MG: at 21:52

## 2021-06-20 RX ADMIN — AMLODIPINE BESYLATE 5 MG: 5 TABLET ORAL at 08:00

## 2021-06-20 RX ADMIN — LORAZEPAM 0.5 MG: 0.5 TABLET ORAL at 19:49

## 2021-06-20 RX ADMIN — PRAVASTATIN SODIUM 20 MG: 20 TABLET ORAL at 16:17

## 2021-06-20 RX ADMIN — PHENYTOIN 1 MG: 125 SUSPENSION ORAL at 21:52

## 2021-06-20 NOTE — PROGRESS NOTES
Pt present in her room at time of assessment sleeping  Pt woke up to take medications  Pt reported that she was sleeping well, had a good day and has no anxiety or depression at this time  Pt denies ah, vh, si, hi  Pt reports no pain  Compliant with medications  No concerns or complaints noted  Continuous visual safety checks performed throughout the shift  Safety precautions maintained  Will continue to monitor

## 2021-06-20 NOTE — PROGRESS NOTES
Progress Note - Nikki Greer 77 y o  female MRN: [id number]    Unit/Bed#Delvin Merrill 201-01 Encounter: [id number]        Subjective:   Patient seen and examined at bedside after reviewing the chart and discussing the case with the caring staff  Patient examined at bedside  Patient has no acute complaints  Physical Exam   Vitals: Blood pressure 142/81, pulse 61, temperature 98 5 °F (36 9 °C), temperature source Temporal, resp  rate 18, height 5' 3" (1 6 m), weight 99 4 kg (219 lb 2 2 oz), SpO2 97 %  ,Body mass index is 38 82 kg/m²  Constitutional: He appears well-developed  HEENT: PERR, EOMI, MMM  Cardiovascular: Normal rate and regular rhythm  Pulmonary/Chest: Effort normal and breath sounds normal    Abdomen: Soft, + BS, NT    Assessment/Plan:  Nikki Greer is a(n) 77y o  year old female with MDD with psychotic symptoms     1  Cardiac with history of hypertension, dyslipidemia, CHF, atrial fibrillation, coronary artery disease status post pacemaker placement  Patient will be continued on metoprolol 25 mg b i d , amlodipine 5 mg daily, Imdur 30 mg daily, Pravachol 20 mg daily and Eliquis 5 mg 2 times daily  2  Neuropathy  Patient is on gabapentin 200 mg 4 times daily  3  GERD  Patient is on Protonix 40 mg daily  4  DJD/osteoarthritis  Patient may continue to receive Tylenol on as needed basis  I will also put the patient on Voltaren gel to be applied over the affected area  I will put the patient on Lidoderm patch for shoulder pain and lower back pain  5  Gait abnormality  I will consult PT OT for further evaluation and treatment  6  Acute UTI  Patient's urine culture is negative for UTI I will discontinue the antibiotics  7  New vitamin-D deficiency  I will put the patient on vitamin-D bolus doses for 8 weeks followed by vitamin D3 1000 units daily  8  New vitamin B12 deficiency  I will put the patient on monthly B12 injections      The patient was discussed with Dr Aron Castrejon and he is in agreement with the above note

## 2021-06-20 NOTE — PROGRESS NOTES
Pt woke up several times with nightmares during the night  Pt requested trazodone 50mg upon the first time waking up  Pt fell back to sleep each time  Pt wants to talk to provider in the morning about increasing minipress  Continuous visual safety checks performed throughout the night  No sign of distress or labored breathing  Safety precautions maintained  Will continue to monitor

## 2021-06-20 NOTE — PLAN OF CARE
Problem: Depression  Goal: Refrain from isolation  Description: Interventions:  - Develop a trusting relationship   - Encourage socialization   Outcome: Progressing     Problem: Depression  Goal: Refrain from self-neglect  Outcome: Progressing

## 2021-06-20 NOTE — NURSING NOTE
Patient observed in community early in shift  Alert, oriented, cooperative and receptive to approach  Reported sophia levels of anxiety/ depression, with rating 7 or 8 out of 10  Feels contributing factors related to increased nightmares and ongoing concern re: the terminal illness of a close friend  Endorsed A/V hallucinations of a "man sitting in a chair at my desk; he told me my friend was doing better "  Patient has been cooperative with med administration  Interactive with peers  Somatic at times, continuing to withdraw to room for extended periods  Declined participation in AM group  Will continue to monitor/assess/ support and encourage expression of thoughts/feelings and contributing stressors

## 2021-06-20 NOTE — PROGRESS NOTES
Patient visible in milieu, pleasant, cooperative in interaction  Social with staff and peers  Rates anxiety and depression at 9/10 citing environmental factors and having a "not so terrible" dream while napping  Denies SI/HI, hallucinations  Remains medication compliant and on 7" checks for safety and behaviors

## 2021-06-20 NOTE — PROGRESS NOTES
Progress Note - Behavioral Health   [place] Shana 77 y o  female MRN: [id number]  Unit/Bed#: [facility] 201-01 Encounter: [id number]    Assessment/Plan   Principal Problem:    MDD (major depressive disorder), recurrent, severe, with psychosis ([address] )  Active Problems:    Essential hypertension    Chronic hand pain, right    Chronic low back pain    Osteoarthritis of lumbar spine with myelopathy    Vitamin D deficiency    CAD (coronary artery disease)    UTI (urinary tract infection)      Behavior over the last 24 hours:  unchanged  Sleep: normal  Appetite: normal  Medication side effects: No  ROS: no complaints    Mental Status Evaluation:  Appearance:  casually dressed   Behavior:  psychomotor retardation   Speech:  soft   Mood:  depressed   Affect:  constricted   Thought Process:  goal directed   Thought Content:  obsessions   Perceptual Disturbances: None   Risk Potential: Suicidal Ideations none  Homicidal Ideations none  Potential for Aggression No   Sensorium:  person, place and time/date   Memory:  recent and remote memory grossly intact   Consciousness:  awake    Attention: attention span and concentration were age appropriate   Insight:  limited   Judgment: limited   Gait/Station: slow   Motor Activity: no abnormal movements     Progress Toward Goals: Pt remains quite quite somatic with multiple complaints including back pain  Seen asleep in bed this morning,staff however report that she remains engaged in the unit milue and social with select peers on the unit   Pt cont to endorse an overall improvement in her mood and remains medication compliant with no issues   Staff report no behavioral issues  Recommended Treatment:   1-Cont current treatment  2-Continue with group therapy, milieu therapy and occupational therapy  Risks, benefits and possible side effects of Medications:   Risks, benefits, and possible side effects of medications explained to patient and patient verbalizes understanding  Medications:   current meds:   Current Facility-Administered Medications   Medication Dose Route Frequency    acetaminophen (TYLENOL) tablet 650 mg  650 mg Oral Q4H PRN    acetaminophen (TYLENOL) tablet 650 mg  650 mg Oral Q4H PRN    acetaminophen (TYLENOL) tablet 975 mg  975 mg Oral Q6H PRN    aluminum-magnesium hydroxide-simethicone (MYLANTA) oral suspension 30 mL  30 mL Oral Q4H PRN    amLODIPine (NORVASC) tablet 5 mg  5 mg Oral Daily    apixaban (ELIQUIS) tablet 5 mg  5 mg Oral BID    cephalexin (KEFLEX) capsule 500 mg  500 mg Oral Q12H [address]    [START ON 8/4/2021] cholecalciferol (VITAMIN D3) tablet 1,000 Units  1,000 Units Oral Daily    Diclofenac Sodium (VOLTAREN) 1 % topical gel 2 g  2 g Topical 4x Daily    ergocalciferol (VITAMIN D2) capsule 50,000 Units  50,000 Units Oral Weekly    gabapentin (NEURONTIN) capsule 300 mg  300 mg Oral TID    hydrOXYzine HCL (ATARAX) tablet 25 mg  25 mg Oral Q6H PRN Max 4/day    isosorbide mononitrate (IMDUR) 24 hr tablet 30 mg  30 mg Oral Daily    lidocaine (LIDODERM) 5 % patch 1 patch  1 patch Topical Daily    LORazepam (ATIVAN) injection 1 mg  1 mg Intramuscular Q6H PRN Max 3/day    LORazepam (ATIVAN) tablet 0 5 mg  0 5 mg Oral Q6H PRN Max 4/day    LORazepam (ATIVAN) tablet 1 mg  1 mg Oral Q6H PRN Max 3/day    melatonin tablet 3 mg  3 mg Oral HS    metoprolol tartrate (LOPRESSOR) tablet 25 mg  25 mg Oral Q12H ALISA    OLANZapine (ZyPREXA) IM injection 5 mg  5 mg Intramuscular Q6H PRN Max 4/day    OLANZapine (ZyPREXA) tablet 10 mg  10 mg Oral Daily    OLANZapine (ZyPREXA) tablet 2 5 mg  2 5 mg Oral Q6H PRN Max 4/day    OLANZapine (ZyPREXA) tablet 5 mg  5 mg Oral Q6H PRN Max 4/day    ondansetron (ZOFRAN-ODT) dispersible tablet 4 mg  4 mg Oral Q6H PRN    pantoprazole (PROTONIX) EC tablet 40 mg  40 mg Oral Early Morning    polyethylene glycol (MIRALAX) packet 17 g  17 g Oral Daily PRN    pravastatin (PRAVACHOL) tablet 20 mg  20 mg Oral Daily With Dinner    prazosin (MINIPRESS) capsule 1 mg  1 mg Oral HS    risperiDONE (RisperDAL M-TAB) disintegrating tablet 0 5 mg  0 5 mg Oral Q6H PRN Max 4/day    senna-docusate sodium (SENOKOT S) 8 6-50 mg per tablet 1 tablet  1 tablet Oral Daily PRN    traZODone (DESYREL) tablet 50 mg  50 mg Oral HS PRN    venlafaxine (EFFEXOR-XR) 24 hr capsule 150 mg  150 mg Oral Daily     Labs: I have personally reviewed all pertinent laboratory/tests results  Last Laboratory Results with Depakote and/or Tegretol levels:   Lab Results   Component Value Date    WBC 10 93 (H) 06/13/2021    RBC 3 91 06/13/2021    HGB 11 7 06/13/2021    HCT 38 1 06/13/2021     06/13/2021    RDW 13 5 06/13/2021    NEUTROABS 7 13 06/13/2021    SODIUM 147 (H) 06/13/2021    K 5 1 06/13/2021     (H) 06/13/2021    CO2 31 06/13/2021    BUN 24 06/13/2021    CREATININE 1 10 06/13/2021    GLUC 77 06/13/2021    CALCIUM 9 0 06/13/2021    AST 24 06/13/2021    ALT 36 06/13/2021    ALKPHOS 145 (H) 06/13/2021    TP 6 7 06/13/2021    ALB 3 2 (L) 06/13/2021    TBILI 0 20 06/13/2021       Counseling / Coordination of Care  Total floor / unit time spent today 25 minutes  Greater than 50% of total time was spent with the patient and / or family counseling and / or coordination of care   A description of the counseling / coordination of care:

## 2021-06-21 PROCEDURE — 99232 SBSQ HOSP IP/OBS MODERATE 35: CPT | Performed by: NURSE PRACTITIONER

## 2021-06-21 RX ORDER — PRAZOSIN HYDROCHLORIDE 1 MG/1
2 CAPSULE ORAL
Status: DISCONTINUED | OUTPATIENT
Start: 2021-06-21 | End: 2021-07-22 | Stop reason: HOSPADM

## 2021-06-21 RX ORDER — TRAMADOL HYDROCHLORIDE 50 MG/1
50 TABLET ORAL EVERY 6 HOURS PRN
Status: DISCONTINUED | OUTPATIENT
Start: 2021-06-21 | End: 2021-07-05

## 2021-06-21 RX ADMIN — PHENYTOIN 2 MG: 125 SUSPENSION ORAL at 22:00

## 2021-06-21 RX ADMIN — TRAMADOL HYDROCHLORIDE 50 MG: 50 TABLET, FILM COATED ORAL at 16:51

## 2021-06-21 RX ADMIN — Medication 2 G: at 17:29

## 2021-06-21 RX ADMIN — APIXABAN 5 MG: 5 TABLET, FILM COATED ORAL at 08:24

## 2021-06-21 RX ADMIN — GABAPENTIN 300 MG: 300 CAPSULE ORAL at 08:24

## 2021-06-21 RX ADMIN — AMLODIPINE BESYLATE 5 MG: 5 TABLET ORAL at 08:23

## 2021-06-21 RX ADMIN — VENLAFAXINE HYDROCHLORIDE 150 MG: 150 CAPSULE, EXTENDED RELEASE ORAL at 08:23

## 2021-06-21 RX ADMIN — METOPROLOL TARTRATE 25 MG: 25 TABLET, FILM COATED ORAL at 22:00

## 2021-06-21 RX ADMIN — OLANZAPINE 10 MG: 5 TABLET ORAL at 08:23

## 2021-06-21 RX ADMIN — ACETAMINOPHEN 975 MG: 325 TABLET ORAL at 04:30

## 2021-06-21 RX ADMIN — PRAVASTATIN SODIUM 20 MG: 20 TABLET ORAL at 16:51

## 2021-06-21 RX ADMIN — GABAPENTIN 300 MG: 300 CAPSULE ORAL at 16:51

## 2021-06-21 RX ADMIN — PANTOPRAZOLE SODIUM 40 MG: 40 TABLET, DELAYED RELEASE ORAL at 06:42

## 2021-06-21 RX ADMIN — APIXABAN 5 MG: 5 TABLET, FILM COATED ORAL at 17:29

## 2021-06-21 RX ADMIN — Medication 2 G: at 12:21

## 2021-06-21 RX ADMIN — CEPHALEXIN 500 MG: 250 CAPSULE ORAL at 08:24

## 2021-06-21 RX ADMIN — METOPROLOL TARTRATE 25 MG: 25 TABLET, FILM COATED ORAL at 08:24

## 2021-06-21 RX ADMIN — DICLOFENAC SODIUM 2 G: 10 GEL TOPICAL at 08:23

## 2021-06-21 RX ADMIN — CEPHALEXIN 500 MG: 250 CAPSULE ORAL at 22:00

## 2021-06-21 RX ADMIN — LORAZEPAM 1 MG: 1 TABLET ORAL at 04:30

## 2021-06-21 RX ADMIN — ISOSORBIDE MONONITRATE 30 MG: 30 TABLET, EXTENDED RELEASE ORAL at 08:24

## 2021-06-21 RX ADMIN — LIDOCAINE 1 PATCH: 50 PATCH TOPICAL at 08:22

## 2021-06-21 RX ADMIN — MELATONIN 3 MG: at 22:05

## 2021-06-21 RX ADMIN — GABAPENTIN 300 MG: 300 CAPSULE ORAL at 22:00

## 2021-06-21 NOTE — PROGRESS NOTES
Progress Note - Behavioral Health   Elisabet Multani 77 y o  female MRN: [id number]  Unit/Bed#: Sheri Correia 201-01 Encounter: [id number]    Assessment/Plan   Principal Problem:    MDD (major depressive disorder), recurrent, severe, with psychosis ([facility] [address] )  Active Problems:    Essential hypertension    Chronic hand pain, right    Chronic low back pain    Osteoarthritis of lumbar spine with myelopathy    Vitamin D deficiency    CAD (coronary artery disease)    UTI (urinary tract infection)      Behavior over the last 24 hours:  unchanged  Sleep: intermittent, nightmares  Appetite: normal  Medication side effects: No  ROS: no complaints and all other systems negative for acute change     Yan Barrera was seen today for psychiatric follow-up  Patient continues to endorse anxiety along with intermittent AVH  She describes her last hallucination being a few days ago of a man sitting on top of the desk talking with her about her friend with the terminal illness  Patient elaborated that she only sees the demons and her nightmares  Nightmares have been ongoing since the weekend and patient reports poor sleep due to this  Yan Barrera is in agreement to increase prazosin at HS  She no longer endorses SI  Despite poor sleep and increased anxiety, patient has an adequate appetite and is social with peers in the milieu  MOCA completed today; score 21/30  Mental Status Evaluation:  Appearance:  age appropriate, casually dressed and overweight   Behavior:  calm and cooperative   Speech:  normal pitch and normal volume   Mood:  mildy anxious   Affect:  mood-congruent and redirectable   Thought Process:  goal directed   Thought Content:  no overt delusions   Perceptual Disturbances: Denies presently, but reports seeing and talking to a man sitting on her desk "a few days ago   He was telling me my friend is alright "    Risk Potential: Suicidal Ideations none  Homicidal Ideations none  Potential for Aggression No   Sensorium:  person, place, time/date and situation   Memory:  recent and remote memory grossly intact   Consciousness:  alert and awake    Attention: attention span and concentration were age appropriate   Insight:  limited   Judgment: limited   Gait/Station: normal gait/station and normal balance   Motor Activity: no abnormal movements     Progress Toward Goals:  No change  Patient continues to report intermittent AVH he in addition to persisting nightmares  Goal is to improve sleep in hopes that will decrease AVH and anxiety  Will increase prazosin to 2 mg QHS  Will continue remainder of psychotropic regimen as ordered  No discharge date at this time  Recommended Treatment: Continue with group therapy, milieu therapy and occupational therapy  Risks, benefits and possible side effects of Medications:   Risks, benefits, and possible side effects of medications explained to patient and patient verbalizes understanding  Medications: all current active meds have been reviewed and planned medication changes: Increase Prazosin 2mg PO QHS  Labs: I have personally reviewed all pertinent laboratory/tests results  Counseling / Coordination of Care  Total floor / unit time spent today 25 minutes  Greater than 50% of total time was spent with the patient and / or family counseling and / or coordination of care

## 2021-06-21 NOTE — PROGRESS NOTES
Progress Note - Madhuri Barraza 77 y o  female MRN: [id number]    Unit/Bed#Cyn[facility] 201-01 Encounter: [id number]        Subjective:   Patient seen and examined at bedside after reviewing the chart and discussing the case with the caring staff  Patient examined at bedside  Patient complains of pain to her hands and shoulder  Physical Exam   Vitals: Blood pressure 138/60, pulse 64, temperature (!) 97 3 °F (36 3 °C), temperature source Temporal, resp  rate 16, height 5' 3" (1 6 m), weight 99 4 kg (219 lb 2 2 oz), SpO2 96 %  ,Body mass index is 38 82 kg/m²  Constitutional: He appears well-developed  HEENT: PERR, EOMI, MMM  Cardiovascular: Normal rate and regular rhythm  Pulmonary/Chest: Effort normal and breath sounds normal    Abdomen: Soft, + BS, NT    Assessment/Plan:  Madhuri Barraza is a(n) 77y o  year old female with MDD with psychotic symptoms     1  Cardiac with history of hypertension, dyslipidemia, CHF, atrial fibrillation, coronary artery disease status post pacemaker placement  Patient will be continued on metoprolol 25 mg b i d , amlodipine 5 mg daily, Imdur 30 mg daily, Pravachol 20 mg daily and Eliquis 5 mg 2 times daily  2  Neuropathy  Patient is on gabapentin 200 mg 4 times daily  3  GERD  Patient is on Protonix 40 mg daily  4  DJD/osteoarthritis  I will also put the patient on Voltaren gel to be applied over the affected area  I will put the patient on Lidoderm patch for shoulder pain and lower back pain  Will start the patient on tramadol 50 mg every 6 hours as needed for severe pain  5  Gait abnormality  I will consult PT OT for further evaluation and treatment  6  Acute UTI  Patient's urine culture is negative for UTI I will discontinue the antibiotics  7  New vitamin-D deficiency  I will put the patient on vitamin-D bolus doses for 8 weeks followed by vitamin D3 1000 units daily  8  New vitamin B12 deficiency  I will put the patient on monthly B12 injections      The patient was discussed with Dr Tom Kimbrough and he is in agreement with the above note

## 2021-06-21 NOTE — PROGRESS NOTES
06/21/21    Team Meeting   Meeting Type Daily Rounds   Team Members Present   Team Members Present Physician;Nurse;   Physician Team Member Dr Anni Shah MD; JOSE Howell   Nursing Team Member Cha Sandoval, [facility]   Care Management Team Member MS Renee, [facility]   OT Team Member Susan Vidal, [place]   Patient/Family Present   Patient Present No   Patient's Family Present No   Nightmares  Awake during the night  PT reports VH  9/10 anxiety and depression  PRNS's  Medication adjustment  No D/C date at this time

## 2021-06-21 NOTE — NURSING NOTE
Pt present in milieu and group  Pt is social with peers  Pt states poor sleep due to her nightmares last night  Pt rates anxiety and depression 7/10  Pt denies SI/HI/AVH  Pt appears stable on feet while ambuating halls with walker provided  Pt rates back, right shoulder, and bilateral hand pain 5/10  Lidoderm patch applied to right shoulder  Voltaren gel applied to low back Q 7 min safety checks maintained

## 2021-06-21 NOTE — PROGRESS NOTES
Pt woke up once with a nightmare about her dead sister  Pt requested water, prn tylenol and was given PRN ativan for anxiety  Pt went back to sleep and fell asleep  Continuous visual safety checks performed throughout the night  No sign of distress or labored breathing  Safety precautions maintained  Will continue to monitor

## 2021-06-21 NOTE — PROGRESS NOTES
Pt present in the milieu and socializing with other patients at time in the hallway  Pt is cooperative, brightens on approach and is very complementary towards staff  Pt did endorse anxiety rated 7/10 and requested PRN ativan  Pt administered 0 5mg  Pt reported no depression, was social and had no concerns or complaints  Pt is supportive of other patients and displays sense of humor and brightens with interaction  Pt endorses ah, vh at times of shadows, demons and sometimes interacts with them  Pt hopes to sleep tonight without nightmares  Pt refused Voltaren gel at HS  Fell asleep early  Continuous visual safety checks performed throughout the shift  Safety precautions maintained  Will continue to monitor

## 2021-06-22 PROCEDURE — 99232 SBSQ HOSP IP/OBS MODERATE 35: CPT | Performed by: NURSE PRACTITIONER

## 2021-06-22 RX ORDER — OLANZAPINE 2.5 MG/1
2.5 TABLET ORAL
Status: DISCONTINUED | OUTPATIENT
Start: 2021-06-22 | End: 2021-06-25

## 2021-06-22 RX ADMIN — PHENYTOIN 2 MG: 125 SUSPENSION ORAL at 20:23

## 2021-06-22 RX ADMIN — PANTOPRAZOLE SODIUM 40 MG: 40 TABLET, DELAYED RELEASE ORAL at 05:26

## 2021-06-22 RX ADMIN — ISOSORBIDE MONONITRATE 30 MG: 30 TABLET, EXTENDED RELEASE ORAL at 08:39

## 2021-06-22 RX ADMIN — OLANZAPINE 10 MG: 5 TABLET ORAL at 08:40

## 2021-06-22 RX ADMIN — GABAPENTIN 300 MG: 300 CAPSULE ORAL at 20:24

## 2021-06-22 RX ADMIN — APIXABAN 5 MG: 5 TABLET, FILM COATED ORAL at 08:39

## 2021-06-22 RX ADMIN — APIXABAN 5 MG: 5 TABLET, FILM COATED ORAL at 17:15

## 2021-06-22 RX ADMIN — MELATONIN 3 MG: at 20:49

## 2021-06-22 RX ADMIN — PRAVASTATIN SODIUM 20 MG: 20 TABLET ORAL at 15:34

## 2021-06-22 RX ADMIN — Medication 2 G: at 08:43

## 2021-06-22 RX ADMIN — AMLODIPINE BESYLATE 5 MG: 5 TABLET ORAL at 08:39

## 2021-06-22 RX ADMIN — CEPHALEXIN 500 MG: 250 CAPSULE ORAL at 08:39

## 2021-06-22 RX ADMIN — OLANZAPINE 2.5 MG: 2.5 TABLET, FILM COATED ORAL at 20:23

## 2021-06-22 RX ADMIN — ACETAMINOPHEN 650 MG: 325 TABLET ORAL at 15:33

## 2021-06-22 RX ADMIN — METOPROLOL TARTRATE 25 MG: 25 TABLET, FILM COATED ORAL at 20:24

## 2021-06-22 RX ADMIN — LORAZEPAM 0.5 MG: 0.5 TABLET ORAL at 12:44

## 2021-06-22 RX ADMIN — TRAMADOL HYDROCHLORIDE 50 MG: 50 TABLET, FILM COATED ORAL at 20:49

## 2021-06-22 RX ADMIN — VENLAFAXINE HYDROCHLORIDE 150 MG: 150 CAPSULE, EXTENDED RELEASE ORAL at 08:38

## 2021-06-22 RX ADMIN — GABAPENTIN 300 MG: 300 CAPSULE ORAL at 15:34

## 2021-06-22 RX ADMIN — GABAPENTIN 300 MG: 300 CAPSULE ORAL at 08:39

## 2021-06-22 RX ADMIN — METOPROLOL TARTRATE 25 MG: 25 TABLET, FILM COATED ORAL at 08:38

## 2021-06-22 RX ADMIN — CEPHALEXIN 500 MG: 250 CAPSULE ORAL at 20:24

## 2021-06-22 RX ADMIN — LIDOCAINE 1 PATCH: 50 PATCH TOPICAL at 08:42

## 2021-06-22 RX ADMIN — TRAMADOL HYDROCHLORIDE 50 MG: 50 TABLET, FILM COATED ORAL at 05:26

## 2021-06-22 NOTE — PROGRESS NOTES
Pt present in the hallway socializing with the other girls for most of the shift  Pt brightens on approach, displays positive attitude but does report some underlying anxiety when asked  Pt worries a lot about sleeping as she is suffering from nightmares almost every time she sleeps and she wants them to stop  Pt is otherwise social with peers, pleasant, cooperative and expresses no complaints or concerns  Pt compliant with medications, refused scheduled Voltaren  Pt went to bed immediately after med administration  Continuous visual safety checks performed throughout the shift  Safety precautions maintained  Will continue to monitor

## 2021-06-22 NOTE — PLAN OF CARE
Problem: Alteration in Thoughts and Perception  Goal: Treatment Goal: Gain control of psychotic behaviors/thinking, reduce/eliminate presenting symptoms and demonstrate improved reality functioning upon discharge  Outcome: Progressing  Goal: Refrain from acting on delusional thinking/internal stimuli  Description: Interventions:  - Monitor patient closely, per order   - Utilize least restrictive measures   - Set reasonable limits, give positive feedback for acceptable   - Administer medications as ordered and monitor of potential side effects  Outcome: Progressing  Goal: Agree to be compliant with medication regime, as prescribed and report medication side effects  Description: Interventions:  - Offer appropriate PRN medication and supervise ingestion; conduct AIMS, as needed   Outcome: Progressing     Problem: Ineffective Coping  Goal: Cooperates with admission process  Description: Interventions:   - Complete admission process  Outcome: Progressing  Goal: Identifies healthy coping skills  Outcome: Progressing  Goal: Participates in unit activities  Description: Interventions:  - Provide therapeutic environment   - Provide required programming   - Redirect inappropriate behaviors   Outcome: Progressing  Goal: Patient/Family participate in treatment and DC plans  Description: Interventions:  - Provide therapeutic environment  Outcome: Progressing     Problem: Depression  Goal: Treatment Goal: Demonstrate behavioral control of depressive symptoms, verbalize feelings of improved mood/affect, and adopt new coping skills prior to discharge  Outcome: Progressing  Goal: Refrain from harming self  Description: Interventions:  - Monitor patient closely, per order   - Supervise medication ingestion, monitor effects and side effects   Outcome: Progressing  Goal: Refrain from isolation  Description: Interventions:  - Develop a trusting relationship   - Encourage socialization   Outcome: Progressing  Goal: Refrain from self-neglect  Outcome: Progressing  Goal: Complete daily ADLs, including personal hygiene independently, as able  Description: Interventions:  - Observe, teach, and assist patient with ADLS  -  Monitor and promote a balance of rest/activity, with adequate nutrition and elimination   Outcome: Progressing     Problem: Anxiety  Goal: Anxiety is at manageable level  Description: Interventions:  - Assess and monitor patient's anxiety level  - Monitor for signs and symptoms (heart palpitations, chest pain, shortness of breath, headaches, nausea, feeling jumpy, restlessness, irritable, apprehensive)  - Collaborate with interdisciplinary team and initiate plan and interventions as ordered  - Entiat patient to unit/surroundings  - Explain treatment plan  - Encourage participation in care  - Encourage verbalization of concerns/fears  - Identify coping mechanisms  - Assist in developing anxiety-reducing skills  - Administer/offer alternative therapies  - Limit or eliminate stimulants  Outcome: Progressing     Problem: Nutrition/Hydration-ADULT  Goal: Nutrient/Hydration intake appropriate for improving, restoring or maintaining nutritional needs  Description: Monitor and assess patient's nutrition/hydration status for malnutrition  Collaborate with interdisciplinary team and initiate plan and interventions as ordered  Monitor patient's weight and dietary intake as ordered or per policy  Utilize nutrition screening tool and intervene as necessary  Determine patient's food preferences and provide high-protein, high-caloric foods as appropriate       INTERVENTIONS:  - Monitor oral intake, urinary output, labs, and treatment plans  - Assess nutrition and hydration status and recommend course of action  - Evaluate amount of meals eaten  - Assist patient with eating if necessary   - Allow adequate time for meals  - Recommend/ encourage appropriate diets, oral nutritional supplements, and vitamin/mineral supplements  - Order, calculate, and assess calorie counts as needed  - Recommend, monitor, and adjust tube feedings and TPN/PPN based on assessed needs  - Assess need for intravenous fluids  - Provide specific nutrition/hydration education as appropriate  - Include patient/family/caregiver in decisions related to nutrition  Outcome: Progressing

## 2021-06-22 NOTE — PROGRESS NOTES
Pt woke up several times throughout the night with nightmares  Pt requested PRN tramadol for 8/10 "trigger finger" pain  Pt went back to sleep  Pt slept through the night without awakening  No sign of distress or labored breathing  Continuous visual checks performed q7 minutes throughout the night  Safety precautions maintained  Will continue to monitor

## 2021-06-22 NOTE — PROGRESS NOTES
PRN Ativan given per request for increased anxiety due to environmental stimulation  Remains on 7" checks for safety and behaviors

## 2021-06-22 NOTE — PROGRESS NOTES
06/22/21    Team Meeting   Meeting Type Daily Rounds   Team Members Present   Team Members Present Physician;Nurse;   Physician Team Member Dr Jacqueline Bolton MD; JOSE Sousa   Nursing Team Member Moe Carias RN   Care Management Team Member MS Renee, [facility]   OT Team Member Rishi [place], [place]   Patient/Family Present   Patient Present No   Patient's Family Present No   PT awaking with nightmares  Slept in 2 hour increments  PRNs  Denies AH/VH  PT reports some anxiety  On keflex until the 26th for uti  No D/C date at this time

## 2021-06-22 NOTE — PROGRESS NOTES
Patient visible in milieu, pleasant and cooperative in interaction  Social with staff and peers  Patient describes anxiety and depression as "not bad", denies SI/HI, hallucinations  Patient spoke of nightmares, recent loss of friends, terminally ill friend, support provided  Attended morning group  Remains medication compliant and on 7" checks for safety and behaviors

## 2021-06-22 NOTE — PROGRESS NOTES
Progress Note - Behavioral Health   Yanet Lund 77 y o  female MRN: [id number]  Unit/Bed#: Monda Seip 201-01 Encounter: [id number]    Assessment/Plan   Principal Problem:    MDD (major depressive disorder), recurrent, severe, with psychosis ([facility] [address] )  Active Problems:    Essential hypertension    Chronic hand pain, right    Chronic low back pain    Osteoarthritis of lumbar spine with myelopathy    Vitamin D deficiency    CAD (coronary artery disease)    UTI (urinary tract infection)      Behavior over the last 24 hours:  unchanged  Sleep: intermittent, nightmares  Appetite: normal  Medication side effects: No  ROS: no complaints and all other systems negative for acute change    Maira Jenkins was seen for psychiatric follow-up  Patient rates her anxiety as 8/10 and depression as 6/10  She no longer endorses AVH and denies SI  However, she still continues to report intermittent sleep with nightmares  She did state that her nightmares were a "little bit better" and did not contain demons  She then spoke about her increased anxiety and became suspicious of the nursing staff thinking, Gerry Postin are afraid to give it to me because they think I'll become addicted    Patient mentioned this multiple times and redirection was minimally effective  Medication education provided to patient  She agreed to increase her Zyprexa at HS  She is often visible in the milieu and pleasant and social with peers      Mental Status Evaluation:  Appearance:  age appropriate, casually dressed and overweight   Behavior:  calm, cooperative   Speech:  normal pitch and normal volume   Mood:  anxious and depressed   Affect:  mood-congruent and redirectable   Thought Process:  circumstantial   Thought Content:  some paranoia   Perceptual Disturbances: None   Risk Potential: Suicidal Ideations none  Homicidal Ideations none  Potential for Aggression No   Sensorium:  person, place and time/date   Memory:  recent and remote memory grossly intact Consciousness:  alert and awake    Attention: attention span and concentration were age appropriate   Insight:  limited   Judgment: limited   Gait/Station: slow and uses walker   Motor Activity: no abnormal movements     Progress Toward Goals: Some improvement  Patient reports slight improvement with nightmares and endorses no AVH or SI  Will add Zyprexa 2 5mg PO QHS for racing thoughts, slight paranoia, and depression  Prazosin increased 6/21/21  No discharge date at this time  Recommended Treatment: Continue with group therapy, milieu therapy and occupational therapy  Risks, benefits and possible side effects of Medications:   Risks, benefits, and possible side effects of medications explained to patient and patient verbalizes understanding  Medications: all current active meds have been reviewed, continue current psychiatric medications and planned medication changes: Add Zyprexa 2 5mg PO QHS  Labs: I have personally reviewed all pertinent laboratory/tests results  Counseling / Coordination of Care  Total floor / unit time spent today 30 minutes  Greater than 50% of total time was spent with the patient and / or family counseling and / or coordination of care   A description of the counseling / coordination of care: medication education, treatment plan, supportive therapy

## 2021-06-22 NOTE — PROGRESS NOTES
Progress Note - Yamileth Muñoz 77 y o  female MRN: [id number]    Unit/Bed#[facility] 201-01 Encounter: [id number]        Subjective:   Patient seen and examined at bedside after reviewing the chart and discussing the case with the caring staff  Patient examined at bedside  Patient has no acute complaints  Physical Exam   Vitals: Blood pressure 138/63, pulse 67, temperature (!) 97 4 °F (36 3 °C), temperature source Temporal, resp  rate 20, height 5' 3" (1 6 m), weight 99 4 kg (219 lb 2 2 oz), SpO2 97 %  ,Body mass index is 38 82 kg/m²  Constitutional: He appears well-developed  HEENT: PERR, EOMI, MMM  Cardiovascular: Normal rate and regular rhythm  Pulmonary/Chest: Effort normal and breath sounds normal    Abdomen: Soft, + BS, NT    Assessment/Plan:  Yamileth Muñoz is a(n) 77y o  year old female with MDD with psychotic symptoms     1  Cardiac with history of hypertension, dyslipidemia, CHF, atrial fibrillation, coronary artery disease status post pacemaker placement  Patient will be continued on metoprolol 25 mg b i d , amlodipine 5 mg daily, Imdur 30 mg daily, Pravachol 20 mg daily and Eliquis 5 mg 2 times daily  2  Neuropathy  Patient is on gabapentin 200 mg 4 times daily  3  GERD  Patient is on Protonix 40 mg daily  4  DJD/osteoarthritis  I will also put the patient on Voltaren gel to be applied over the affected area  I will put the patient on Lidoderm patch for shoulder pain and lower back pain  Will start the patient on tramadol 50 mg every 6 hours as needed for severe pain  5  Gait abnormality  I will consult PT OT for further evaluation and treatment  6  Acute UTI  Patient's urine culture is negative for UTI I will discontinue the antibiotics  7  New vitamin-D deficiency  I will put the patient on vitamin-D bolus doses for 8 weeks followed by vitamin D3 1000 units daily  8  New vitamin B12 deficiency  I will put the patient on monthly B12 injections      The patient was discussed with Dr Zoila Villegas and he is in agreement with the above note

## 2021-06-23 PROCEDURE — 99232 SBSQ HOSP IP/OBS MODERATE 35: CPT | Performed by: NURSE PRACTITIONER

## 2021-06-23 RX ADMIN — VENLAFAXINE HYDROCHLORIDE 150 MG: 150 CAPSULE, EXTENDED RELEASE ORAL at 08:18

## 2021-06-23 RX ADMIN — ACETAMINOPHEN 650 MG: 325 TABLET ORAL at 12:52

## 2021-06-23 RX ADMIN — CEPHALEXIN 500 MG: 250 CAPSULE ORAL at 21:15

## 2021-06-23 RX ADMIN — ISOSORBIDE MONONITRATE 30 MG: 30 TABLET, EXTENDED RELEASE ORAL at 08:18

## 2021-06-23 RX ADMIN — ERGOCALCIFEROL 50000 UNITS: 1.25 CAPSULE ORAL at 08:19

## 2021-06-23 RX ADMIN — CEPHALEXIN 500 MG: 250 CAPSULE ORAL at 08:18

## 2021-06-23 RX ADMIN — GABAPENTIN 300 MG: 300 CAPSULE ORAL at 21:15

## 2021-06-23 RX ADMIN — GABAPENTIN 300 MG: 300 CAPSULE ORAL at 15:54

## 2021-06-23 RX ADMIN — MELATONIN 3 MG: at 21:15

## 2021-06-23 RX ADMIN — METOPROLOL TARTRATE 25 MG: 25 TABLET, FILM COATED ORAL at 21:15

## 2021-06-23 RX ADMIN — LIDOCAINE 1 PATCH: 50 PATCH TOPICAL at 08:20

## 2021-06-23 RX ADMIN — APIXABAN 5 MG: 5 TABLET, FILM COATED ORAL at 17:21

## 2021-06-23 RX ADMIN — METOPROLOL TARTRATE 25 MG: 25 TABLET, FILM COATED ORAL at 08:18

## 2021-06-23 RX ADMIN — PANTOPRAZOLE SODIUM 40 MG: 40 TABLET, DELAYED RELEASE ORAL at 06:02

## 2021-06-23 RX ADMIN — PRAVASTATIN SODIUM 20 MG: 20 TABLET ORAL at 15:54

## 2021-06-23 RX ADMIN — PHENYTOIN 2 MG: 125 SUSPENSION ORAL at 21:15

## 2021-06-23 RX ADMIN — AMLODIPINE BESYLATE 5 MG: 5 TABLET ORAL at 08:18

## 2021-06-23 RX ADMIN — OLANZAPINE 2.5 MG: 2.5 TABLET, FILM COATED ORAL at 21:15

## 2021-06-23 RX ADMIN — GABAPENTIN 300 MG: 300 CAPSULE ORAL at 08:19

## 2021-06-23 RX ADMIN — OLANZAPINE 10 MG: 5 TABLET ORAL at 08:19

## 2021-06-23 RX ADMIN — APIXABAN 5 MG: 5 TABLET, FILM COATED ORAL at 08:19

## 2021-06-23 NOTE — PROGRESS NOTES
Patient resting quietly thus far into shift  No nightmares noted  Respirations easy on room air  Q 7 minute checks maintained  Will continue to monitor

## 2021-06-23 NOTE — PROGRESS NOTES
06/23/21   Team Meeting   Meeting Type Daily Rounds   Team Members Present   Team Members Present Physician;Nurse;;Occupational Therapist   Physician Team Member Dr Juanito Sewell MD; JOSE Marsh   Nursing Team Member José Manuel Moralez RN   Care Management Team Member Cintia Duran 85, [facility]   OT Team Member Bora [place], [place]   Patient/Family Present   Patient Present No   Patient's Family Present No   Slept better last night, PT reports anxiety and depression are not bad, denies thoughts, medication adjustment  No D/C date at this time  MOCA completed 21/30  Forgetful at times  PT needing assisted living

## 2021-06-23 NOTE — NURSING NOTE
[id number]  Pt found to be resting quietly on most of authors rounds  No acute behavioral issues noted  Q 7 min checks maintained  Fall protocol in place

## 2021-06-23 NOTE — PLAN OF CARE
Problem: Alteration in Thoughts and Perception  Goal: Treatment Goal: Gain control of psychotic behaviors/thinking, reduce/eliminate presenting symptoms and demonstrate improved reality functioning upon discharge  Outcome: Progressing  Goal: Refrain from acting on delusional thinking/internal stimuli  Description: Interventions:  - Monitor patient closely, per order   - Utilize least restrictive measures   - Set reasonable limits, give positive feedback for acceptable   - Administer medications as ordered and monitor of potential side effects  Outcome: Progressing  Goal: Agree to be compliant with medication regime, as prescribed and report medication side effects  Description: Interventions:  - Offer appropriate PRN medication and supervise ingestion; conduct AIMS, as needed   Outcome: Progressing     Problem: Ineffective Coping  Goal: Cooperates with admission process  Description: Interventions:   - Complete admission process  Outcome: Progressing  Goal: Identifies healthy coping skills  Outcome: Progressing  Goal: Participates in unit activities  Description: Interventions:  - Provide therapeutic environment   - Provide required programming   - Redirect inappropriate behaviors   Outcome: Progressing  Goal: Patient/Family participate in treatment and DC plans  Description: Interventions:  - Provide therapeutic environment  Outcome: Progressing     Problem: Depression  Goal: Treatment Goal: Demonstrate behavioral control of depressive symptoms, verbalize feelings of improved mood/affect, and adopt new coping skills prior to discharge  Outcome: Progressing  Goal: Refrain from harming self  Description: Interventions:  - Monitor patient closely, per order   - Supervise medication ingestion, monitor effects and side effects   Outcome: Progressing  Goal: Refrain from isolation  Description: Interventions:  - Develop a trusting relationship   - Encourage socialization   Outcome: Progressing  Goal: Refrain from self-neglect  Outcome: Progressing  Goal: Complete daily ADLs, including personal hygiene independently, as able  Description: Interventions:  - Observe, teach, and assist patient with ADLS  -  Monitor and promote a balance of rest/activity, with adequate nutrition and elimination   Outcome: Progressing     Problem: Anxiety  Goal: Anxiety is at manageable level  Description: Interventions:  - Assess and monitor patient's anxiety level  - Monitor for signs and symptoms (heart palpitations, chest pain, shortness of breath, headaches, nausea, feeling jumpy, restlessness, irritable, apprehensive)  - Collaborate with interdisciplinary team and initiate plan and interventions as ordered  - [place] patient to unit/surroundings  - Explain treatment plan  - Encourage participation in care  - Encourage verbalization of concerns/fears  - Identify coping mechanisms  - Assist in developing anxiety-reducing skills  - Administer/offer alternative therapies  - Limit or eliminate stimulants  Outcome: Progressing     Problem: Nutrition/Hydration-ADULT  Goal: Nutrient/Hydration intake appropriate for improving, restoring or maintaining nutritional needs  Description: Monitor and assess patient's nutrition/hydration status for malnutrition  Collaborate with interdisciplinary team and initiate plan and interventions as ordered  Monitor patient's weight and dietary intake as ordered or per policy  Utilize nutrition screening tool and intervene as necessary  Determine patient's food preferences and provide high-protein, high-caloric foods as appropriate       INTERVENTIONS:  - Monitor oral intake, urinary output, labs, and treatment plans  - Assess nutrition and hydration status and recommend course of action  - Evaluate amount of meals eaten  - Assist patient with eating if necessary   - Allow adequate time for meals  - Recommend/ encourage appropriate diets, oral nutritional supplements, and vitamin/mineral supplements  - Order, calculate, and assess calorie counts as needed  - Recommend, monitor, and adjust tube feedings and TPN/PPN based on assessed needs  - Assess need for intravenous fluids  - Provide specific nutrition/hydration education as appropriate  - Include patient/family/caregiver in decisions related to nutrition  Outcome: Progressing

## 2021-06-23 NOTE — PROGRESS NOTES
Progress Note - Christiane Bullard 77 y o  female MRN: [id number]    Unit/Bed#[facility] 201-01 Encounter: [id number]        Subjective:   Patient seen and examined at bedside after reviewing the chart and discussing the case with the caring staff  Patient examined at bedside  Patient is complaining of overgrown toenails  Physical Exam   Vitals: Blood pressure 121/73, pulse 69, temperature (!) 97 4 °F (36 3 °C), temperature source Temporal, resp  rate 16, height 5' 3" (1 6 m), weight 99 4 kg (219 lb 2 2 oz), SpO2 97 %  ,Body mass index is 38 82 kg/m²  Constitutional: He appears well-developed  HEENT: PERR, EOMI, MMM  Cardiovascular: Normal rate and regular rhythm  Pulmonary/Chest: Effort normal and breath sounds normal    Abdomen: Soft, + BS, NT    Assessment/Plan:  Christiane Bullard is a(n) 77y o  year old female with MDD with psychotic symptoms     1  Cardiac with history of hypertension, dyslipidemia, CHF, atrial fibrillation, coronary artery disease status post pacemaker placement  Patient will be continued on metoprolol 25 mg b i d , amlodipine 5 mg daily, Imdur 30 mg daily, Pravachol 20 mg daily and Eliquis 5 mg 2 times daily  2  Neuropathy  Patient is on gabapentin 200 mg 4 times daily  3  GERD  Patient is on Protonix 40 mg daily  4  DJD/osteoarthritis  I will also put the patient on Voltaren gel to be applied over the affected area  I will put the patient on Lidoderm patch for shoulder pain and lower back pain  Will start the patient on tramadol 50 mg every 6 hours as needed for severe pain  5  Gait abnormality  I will consult PT OT for further evaluation and treatment  6  Acute UTI  Patient's urine culture is negative for UTI I will discontinue the antibiotics  7  New vitamin-D deficiency  I will put the patient on vitamin-D bolus doses for 8 weeks followed by vitamin D3 1000 units daily  8  New vitamin B12 deficiency  I will put the patient on monthly B12 injections    9  Overgrown toenails  Podiatry has been consulted  The patient was discussed with Dr Heather Nicholson and he is in agreement with the above note

## 2021-06-23 NOTE — SOCIAL WORK
CM met and spoke with patient this afternoon for patient check in  Patient expressed no questions or concerns for this CM at this time but noted she does have some medical questions for the doctor when she see's them tomorrow  CM encouraged her to continue to follow the treatment team recommendations and attend groups on the unit  CM will continue to follow patient's progress and assist with discharge planning needs

## 2021-06-23 NOTE — PROGRESS NOTES
Progress Note - Behavioral Health   Magy Romero 77 y o  female MRN: [id number]  Unit/Bed#: [facility] 201-01 Encounter: [id number]    Assessment/Plan   Principal Problem:    MDD (major depressive disorder), recurrent, severe, with psychosis ([facility] [address] )  Active Problems:    Essential hypertension    Chronic hand pain, right    Chronic low back pain    Osteoarthritis of lumbar spine with myelopathy    Vitamin D deficiency    CAD (coronary artery disease)    UTI (urinary tract infection)      Behavior over the last 24 hours:  Improved  Sleep:  Improved  Appetite: normal  Medication side effects: No  ROS: Complain of pain in right hand due to trigger finger otherwise all other systems negative for acute change    Shahla Echeverria was seen today for psychiatric follow-up  Patient reports less anxiety and states that circumstantial to events on unit with other peers, "I know they can't help but scream and yell, but it does make me anxious " Presently, patient denies any anxiety, but rates depression as 6/10  She no longer endorses AVH and reports her last hallucinations being 2 days ago  Shahla Echeverria was able to sleep throughout the night last night and denied any nightmares  Her thoughts are also less racing and she was linear and goal-directed in thought  Denies SI/HI        Mental Status Evaluation:  Appearance:  age appropriate, casually dressed and overweight   Behavior:  Calm, pleasant, cooperative   Speech:  normal pitch and normal volume   Mood:  Less anxious, better   Affect:  mood-congruent   Thought Process:  goal directed and logical   Thought Content:  No overt delusions   Perceptual Disturbances: None   Risk Potential: Suicidal Ideations none  Homicidal Ideations none  Potential for Aggression No   Sensorium:  person, place, time/date and situation   Memory:  recent and remote memory grossly intact   Consciousness:  alert and awake    Attention: attention span and concentration were age appropriate   Insight:  limited Judgment: limited   Gait/Station: Slow, uses walker   Motor Activity: no abnormal movements     Progress Toward Goals:  Some improvement  Patient was able to sleep throughout the night and reports decrease in anxiety  Zyprexa added to HS regimen last evening  No longer experiencing AVH  Will continue with current psychotropic regimen  No discharge date at this time  Recommended Treatment: Continue with group therapy, milieu therapy and occupational therapy  Risks, benefits and possible side effects of Medications:   Risks, benefits, and possible side effects of medications explained to patient and patient verbalizes understanding        Medications:   all current active meds have been reviewed, continue current psychiatric medications and current meds:   Current Facility-Administered Medications   Medication Dose Route Frequency    acetaminophen (TYLENOL) tablet 650 mg  650 mg Oral Q4H PRN    acetaminophen (TYLENOL) tablet 650 mg  650 mg Oral Q4H PRN    aluminum-magnesium hydroxide-simethicone (MYLANTA) oral suspension 30 mL  30 mL Oral Q4H PRN    amLODIPine (NORVASC) tablet 5 mg  5 mg Oral Daily    apixaban (ELIQUIS) tablet 5 mg  5 mg Oral BID    cephalexin (KEFLEX) capsule 500 mg  500 mg Oral Q12H [address]    [START ON 8/4/2021] cholecalciferol (VITAMIN D3) tablet 1,000 Units  1,000 Units Oral Daily    Diclofenac Sodium (VOLTAREN) 1 % topical gel 2 g  2 g Topical 4x Daily PRN    ergocalciferol (VITAMIN D2) capsule 50,000 Units  50,000 Units Oral Weekly    gabapentin (NEURONTIN) capsule 300 mg  300 mg Oral TID    hydrOXYzine HCL (ATARAX) tablet 25 mg  25 mg Oral Q6H PRN Max 4/day    isosorbide mononitrate (IMDUR) 24 hr tablet 30 mg  30 mg Oral Daily    lidocaine (LIDODERM) 5 % patch 1 patch  1 patch Topical Daily    LORazepam (ATIVAN) injection 1 mg  1 mg Intramuscular Q6H PRN Max 3/day    LORazepam (ATIVAN) tablet 0 5 mg  0 5 mg Oral Q6H PRN Max 4/day    LORazepam (ATIVAN) tablet 1 mg  1 mg Oral Q6H PRN Max 3/day    melatonin tablet 3 mg  3 mg Oral HS    metoprolol tartrate (LOPRESSOR) tablet 25 mg  25 mg Oral Q12H [address]    OLANZapine (ZyPREXA) IM injection 5 mg  5 mg Intramuscular Q6H PRN Max 4/day    OLANZapine (ZyPREXA) tablet 10 mg  10 mg Oral Daily    OLANZapine (ZyPREXA) tablet 2 5 mg  2 5 mg Oral Q6H PRN Max 4/day    OLANZapine (ZyPREXA) tablet 2 5 mg  2 5 mg Oral HS    OLANZapine (ZyPREXA) tablet 5 mg  5 mg Oral Q6H PRN Max 4/day    ondansetron (ZOFRAN-ODT) dispersible tablet 4 mg  4 mg Oral Q6H PRN    pantoprazole (PROTONIX) EC tablet 40 mg  40 mg Oral Early Morning    polyethylene glycol (MIRALAX) packet 17 g  17 g Oral Daily PRN    pravastatin (PRAVACHOL) tablet 20 mg  20 mg Oral Daily With Dinner    prazosin (MINIPRESS) capsule 2 mg  2 mg Oral HS    risperiDONE (RisperDAL M-TAB) disintegrating tablet 0 5 mg  0 5 mg Oral Q6H PRN Max 4/day    senna-docusate sodium (SENOKOT S) 8 6-50 mg per tablet 1 tablet  1 tablet Oral Daily PRN    traMADol (ULTRAM) tablet 50 mg  50 mg Oral Q6H PRN    traZODone (DESYREL) tablet 50 mg  50 mg Oral HS PRN    venlafaxine (EFFEXOR-XR) 24 hr capsule 150 mg  150 mg Oral Daily     Labs: I have personally reviewed all pertinent laboratory/tests results  Counseling / Coordination of Care  Total floor / unit time spent today 25 minutes  Greater than 50% of total time was spent with the patient and / or family counseling and / or coordination of care

## 2021-06-23 NOTE — PROGRESS NOTES
Patient visible in milieu, pleasant and cooperative in interaction, social with staff and peers  Patient endorses depression 7/10 due to roommate's pending discharge, anxiety 5/10, support provided  Denies SI/HI, hallucinations  Per patient, she stated she slept well last night, no noted nightmares however does feel more tired today  Remains medication compliant and on 7" checks for safety and behaviors

## 2021-06-23 NOTE — PROGRESS NOTES
Patient ambulating in hallway throughout shift  OOB for meals and group  Social with peers and staff  Compliant with meds and meals  Denied any depression or anxiety  Denied SI and HI  Tramadol given with HS meds for c/o back pain  Good results noted  No references this evening of her lost friend  Q 7 minute safety checks maintained  Currently resting quietly in bed

## 2021-06-24 PROCEDURE — 99232 SBSQ HOSP IP/OBS MODERATE 35: CPT | Performed by: NURSE PRACTITIONER

## 2021-06-24 RX ADMIN — LORAZEPAM 0.5 MG: 0.5 TABLET ORAL at 15:33

## 2021-06-24 RX ADMIN — CEPHALEXIN 500 MG: 250 CAPSULE ORAL at 08:25

## 2021-06-24 RX ADMIN — PANTOPRAZOLE SODIUM 40 MG: 40 TABLET, DELAYED RELEASE ORAL at 05:55

## 2021-06-24 RX ADMIN — APIXABAN 5 MG: 5 TABLET, FILM COATED ORAL at 17:12

## 2021-06-24 RX ADMIN — OLANZAPINE 2.5 MG: 2.5 TABLET, FILM COATED ORAL at 21:16

## 2021-06-24 RX ADMIN — METOPROLOL TARTRATE 25 MG: 25 TABLET, FILM COATED ORAL at 21:15

## 2021-06-24 RX ADMIN — MELATONIN 3 MG: at 21:16

## 2021-06-24 RX ADMIN — GABAPENTIN 300 MG: 300 CAPSULE ORAL at 08:25

## 2021-06-24 RX ADMIN — VENLAFAXINE HYDROCHLORIDE 150 MG: 150 CAPSULE, EXTENDED RELEASE ORAL at 08:26

## 2021-06-24 RX ADMIN — METOPROLOL TARTRATE 25 MG: 25 TABLET, FILM COATED ORAL at 08:28

## 2021-06-24 RX ADMIN — TRAMADOL HYDROCHLORIDE 50 MG: 50 TABLET, FILM COATED ORAL at 06:51

## 2021-06-24 RX ADMIN — OLANZAPINE 10 MG: 5 TABLET ORAL at 08:25

## 2021-06-24 RX ADMIN — PHENYTOIN 2 MG: 125 SUSPENSION ORAL at 21:15

## 2021-06-24 RX ADMIN — APIXABAN 5 MG: 5 TABLET, FILM COATED ORAL at 08:25

## 2021-06-24 RX ADMIN — ISOSORBIDE MONONITRATE 30 MG: 30 TABLET, EXTENDED RELEASE ORAL at 08:28

## 2021-06-24 RX ADMIN — LIDOCAINE 1 PATCH: 50 PATCH TOPICAL at 08:29

## 2021-06-24 RX ADMIN — CEPHALEXIN 500 MG: 250 CAPSULE ORAL at 21:15

## 2021-06-24 RX ADMIN — GABAPENTIN 300 MG: 300 CAPSULE ORAL at 21:15

## 2021-06-24 RX ADMIN — LORAZEPAM 0.5 MG: 0.5 TABLET ORAL at 03:02

## 2021-06-24 RX ADMIN — AMLODIPINE BESYLATE 5 MG: 5 TABLET ORAL at 08:28

## 2021-06-24 RX ADMIN — PRAVASTATIN SODIUM 20 MG: 20 TABLET ORAL at 15:33

## 2021-06-24 RX ADMIN — GABAPENTIN 300 MG: 300 CAPSULE ORAL at 15:33

## 2021-06-24 NOTE — PROGRESS NOTES
Patient visible in milieu, pleasant and cooperative in interaction, social with staff and peers  Patient reports "high" anxiety/depression due to having a nightmare last night consisting of "people getting stabbed with blood everywhere", "I woke up screaming", support provided  Denies SI/HI, hallucinations  Remains medication compliant and on 7" checks for safety and behaviors

## 2021-06-24 NOTE — PROGRESS NOTES
Patient complained of nightmares and moderate anxiety  Ativan 0 5 mg given at 0302    Chappell Scale 22

## 2021-06-24 NOTE — PROGRESS NOTES
Progress Note - Lucrecia Reilly 77 y o  female MRN: [id number]    Unit/Bed#[facility] 201-01 Encounter: [id number]        Subjective:   Patient seen and examined at bedside after reviewing the chart and discussing the case with the caring staff  Patient examined at bedside  Patient is complaining of shoulder and hand pain, but states that the lidoderm patch and tramadol alleviate her pain significantly  She is also complaining of thickened overgrown toenails  I consulted Podiatry yesterday  Physical Exam   Vitals: Blood pressure 154/75, pulse 68, temperature (!) 96 8 °F (36 °C), temperature source Temporal, resp  rate 18, height 5' 3" (1 6 m), weight 99 1 kg (218 lb 7 6 oz), SpO2 97 %  ,Body mass index is 38 7 kg/m²  Constitutional: He appears well-developed  HEENT: PERR, EOMI, MMM  Cardiovascular: Normal rate and regular rhythm  Pulmonary/Chest: Effort normal and breath sounds normal    Abdomen: Soft, + BS, NT  Extremity:  Bilateral big toe nails thickened  Assessment/Plan:  Lucrecia Reilly is a(n) 77y o  year old female with MDD with psychotic symptoms     1  Cardiac with history of hypertension, dyslipidemia, CHF, atrial fibrillation, coronary artery disease status post pacemaker placement  Patient will be continued on metoprolol 25 mg b i d , amlodipine 5 mg daily, Imdur 30 mg daily, Pravachol 20 mg daily and Eliquis 5 mg 2 times daily  2  Neuropathy  Patient is on gabapentin 200 mg 4 times daily  3  GERD  Patient is on Protonix 40 mg daily  4  DJD/osteoarthritis  I will also put the patient on Voltaren gel to be applied over the affected area  I will put the patient on Lidoderm patch for shoulder pain and lower back pain  Will start the patient on tramadol 50 mg every 6 hours as needed for severe pain  5  Gait abnormality  I will consult PT OT for further evaluation and treatment  6  Acute UTI  Patient's urine culture is negative for UTI I will discontinue the antibiotics    7  New vitamin-D deficiency  I will put the patient on vitamin-D bolus doses for 8 weeks followed by vitamin D3 1000 units daily  8  New vitamin B12 deficiency  I will put the patient on monthly B12 injections  9  Overgrown toenails  Podiatry has been consulted  The patient was discussed with Dr Charo Lagunas and he is in agreement with the above note

## 2021-06-24 NOTE — PROGRESS NOTES
Patient visible on the unit  Pleasant and cooperative  Social with staff and peers  Appetite good  Compliant with medications  Some anxiety and depression  Denies SI, HI, AVH  Q 7 minute checks maintained

## 2021-06-24 NOTE — PROGRESS NOTES
Progress Note - Behavioral Health   [facility] 77 y o  female MRN: [id number]  Unit/Bed#: Tasha Robbins 201-01 Encounter: [id number]    Assessment/Plan   Principal Problem:    MDD (major depressive disorder), recurrent, severe, with psychosis ([address] )  Active Problems:    Essential hypertension    Chronic hand pain, right    Chronic low back pain    Osteoarthritis of lumbar spine with myelopathy    Vitamin D deficiency    CAD (coronary artery disease)    UTI (urinary tract infection)      Behavior over the last 24 hours:  unchanged  Sleep:  1 nightmare which interrupted sleep  Appetite: normal  Medication side effects: No  ROS: no complaints and All other systems negative for acute change     Lorne Thomas was seen today for psychiatric follow-up  Patient reports having a nightmare of people being "murdered with a knife " She reports disturbed sleep due to nightmare, however, was able to fall asleep afterwards  She continues to endorse moderate anxiety and depression and no longer has AVH/SI  Her mood was controlled throughout interview and she voiced no delusional content  Lorne Thomas was in agreement to increase her Effexor to assist with her anxiety and depression  She is often visible in the milieu and social with select peers      Mental Status Evaluation:  Appearance:  age appropriate, casually dressed and Laying in bed   Behavior:  Calm and cooperative   Speech:  normal pitch and normal volume   Mood:  anxious and depressed   Affect:  constricted and redirectable   Thought Process:  circumstantial   Thought Content:  No overt delusions   Perceptual Disturbances: None   Risk Potential: Suicidal Ideations none  Homicidal Ideations none  Potential for Aggression No   Sensorium:  person, place, time/date and situation   Memory:  recent and remote memory grossly intact   Consciousness:  alert and awake    Attention: attention span and concentration were age appropriate   Insight:  limited   Judgment: limited   Gait/Station: Uses walker   Motor Activity: no abnormal movements     Progress Toward Goals:  Some improvement  Patient having nightmares less frequently in no longer endorses AVH/SI  Will increase Effexor to 225 mg for anxiety and depression (BP stable)  Will continue remainder of psychotropic regimen as ordered  No discharge date at this time    Recommended Treatment: Continue with group therapy, milieu therapy and occupational therapy  Risks, benefits and possible side effects of Medications:   Risks, benefits, and possible side effects of medications explained to patient and patient verbalizes understanding  Medications: all current active meds have been reviewed, continue current psychiatric medications and planned medication changes: Increase Effexor 225mg PO QD  Labs: I have personally reviewed all pertinent laboratory/tests results  Counseling / Coordination of Care  Total floor / unit time spent today 25 minutes  Greater than 50% of total time was spent with the patient and / or family counseling and / or coordination of care

## 2021-06-24 NOTE — PROGRESS NOTES
06/24/21    Team Meeting   Meeting Type Daily Rounds   Team Members Present   Team Members Present Physician;Nurse;;Occupational Therapist   Physician Team Member Dr Chris Sparrow MD; JOSE Romero   Nursing Team Member Salbador Mckeon RN   Care Management Team Member MS Renee, [facility]   OT Team Member Mary [place], [place]   Patient/Family Present   Patient Present No   Patient's Family Present No   Nightmare last night, reports anxiety and depression are high today  No D/C date at this time  PT reports sleeping well outside of the nightmare

## 2021-06-24 NOTE — PLAN OF CARE
self-neglect  Outcome: Progressing  Goal: Complete daily ADLs, including personal hygiene independently, as able  Description: Interventions:  - Observe, teach, and assist patient with ADLS  -  Monitor and promote a balance of rest/activity, with adequate nutrition and elimination   Outcome: Progressing     Problem: Anxiety  Goal: Anxiety is at manageable level  Description: Interventions:  - Assess and monitor patient's anxiety level  - Monitor for signs and symptoms (heart palpitations, chest pain, shortness of breath, headaches, nausea, feeling jumpy, restlessness, irritable, apprehensive)  - Collaborate with interdisciplinary team and initiate plan and interventions as ordered  - [place] patient to unit/surroundings  - Explain treatment plan  - Encourage participation in care  - Encourage verbalization of concerns/fears  - Identify coping mechanisms  - Assist in developing anxiety-reducing skills  - Administer/offer alternative therapies  - Limit or eliminate stimulants  Outcome: Progressing     Problem: Nutrition/Hydration-ADULT  Goal: Nutrient/Hydration intake appropriate for improving, restoring or maintaining nutritional needs  Description: Monitor and assess patient's nutrition/hydration status for malnutrition  Collaborate with interdisciplinary team and initiate plan and interventions as ordered  Monitor patient's weight and dietary intake as ordered or per policy  Utilize nutrition screening tool and intervene as necessary  Determine patient's food preferences and provide high-protein, high-caloric foods as appropriate       INTERVENTIONS:  - Monitor oral intake, urinary output, labs, and treatment plans  - Assess nutrition and hydration status and recommend course of action  - Evaluate amount of meals eaten  - Assist patient with eating if necessary   - Allow adequate time for meals  - Recommend/ encourage appropriate diets, oral nutritional supplements, and vitamin/mineral supplements  - Order, calculate, and assess calorie counts as needed  - Recommend, monitor, and adjust tube feedings and TPN/PPN based on assessed needs  - Assess need for intravenous fluids  - Provide specific nutrition/hydration education as appropriate  - Include patient/family/caregiver in decisions related to nutrition  Outcome: Progressing

## 2021-06-25 PROCEDURE — 0HBRXZZ EXCISION OF TOE NAIL, EXTERNAL APPROACH: ICD-10-PCS | Performed by: PODIATRIST

## 2021-06-25 PROCEDURE — 99232 SBSQ HOSP IP/OBS MODERATE 35: CPT | Performed by: NURSE PRACTITIONER

## 2021-06-25 RX ORDER — OLANZAPINE 5 MG/1
5 TABLET ORAL
Status: DISCONTINUED | OUTPATIENT
Start: 2021-06-25 | End: 2021-06-29

## 2021-06-25 RX ORDER — LIDOCAINE 50 MG/G
1 PATCH TOPICAL DAILY
Status: DISCONTINUED | OUTPATIENT
Start: 2021-06-25 | End: 2021-06-28

## 2021-06-25 RX ADMIN — GABAPENTIN 300 MG: 300 CAPSULE ORAL at 09:08

## 2021-06-25 RX ADMIN — AMLODIPINE BESYLATE 5 MG: 5 TABLET ORAL at 09:07

## 2021-06-25 RX ADMIN — PRAVASTATIN SODIUM 20 MG: 20 TABLET ORAL at 16:15

## 2021-06-25 RX ADMIN — MELATONIN 3 MG: at 21:24

## 2021-06-25 RX ADMIN — OLANZAPINE 10 MG: 5 TABLET ORAL at 09:07

## 2021-06-25 RX ADMIN — PANTOPRAZOLE SODIUM 40 MG: 40 TABLET, DELAYED RELEASE ORAL at 06:07

## 2021-06-25 RX ADMIN — CEPHALEXIN 500 MG: 250 CAPSULE ORAL at 09:06

## 2021-06-25 RX ADMIN — GABAPENTIN 300 MG: 300 CAPSULE ORAL at 16:15

## 2021-06-25 RX ADMIN — OLANZAPINE 5 MG: 5 TABLET, FILM COATED ORAL at 21:25

## 2021-06-25 RX ADMIN — PHENYTOIN 2 MG: 125 SUSPENSION ORAL at 21:24

## 2021-06-25 RX ADMIN — METOPROLOL TARTRATE 25 MG: 25 TABLET, FILM COATED ORAL at 09:08

## 2021-06-25 RX ADMIN — LIDOCAINE 1 PATCH: 50 PATCH TOPICAL at 15:30

## 2021-06-25 RX ADMIN — LIDOCAINE 1 PATCH: 50 PATCH TOPICAL at 09:10

## 2021-06-25 RX ADMIN — ACETAMINOPHEN 650 MG: 325 TABLET ORAL at 06:07

## 2021-06-25 RX ADMIN — GABAPENTIN 300 MG: 300 CAPSULE ORAL at 21:24

## 2021-06-25 RX ADMIN — METOPROLOL TARTRATE 25 MG: 25 TABLET, FILM COATED ORAL at 21:24

## 2021-06-25 RX ADMIN — CEPHALEXIN 500 MG: 250 CAPSULE ORAL at 21:24

## 2021-06-25 RX ADMIN — TRAZODONE HYDROCHLORIDE 50 MG: 50 TABLET ORAL at 01:45

## 2021-06-25 RX ADMIN — TRAMADOL HYDROCHLORIDE 50 MG: 50 TABLET, FILM COATED ORAL at 11:41

## 2021-06-25 RX ADMIN — APIXABAN 5 MG: 5 TABLET, FILM COATED ORAL at 17:56

## 2021-06-25 RX ADMIN — LORAZEPAM 0.5 MG: 0.5 TABLET ORAL at 11:41

## 2021-06-25 RX ADMIN — ISOSORBIDE MONONITRATE 30 MG: 30 TABLET, EXTENDED RELEASE ORAL at 09:06

## 2021-06-25 RX ADMIN — VENLAFAXINE HYDROCHLORIDE 225 MG: 150 CAPSULE, EXTENDED RELEASE ORAL at 09:05

## 2021-06-25 RX ADMIN — APIXABAN 5 MG: 5 TABLET, FILM COATED ORAL at 09:08

## 2021-06-25 NOTE — PROGRESS NOTES
Patient visible on the unit  She attended group  With morning medication administration patient stated of high anxiety and depression  C/o pain in back #6  She stated she had hallucinations last night of seeing the devil  She denied SI,HI, or hallucinations currently  Q 7 minute safety checks maintained  At 11:45 am  Patient currently complaining of increased anxiety and requesting PRN medication  Ativan administered as requested for anxiety and PRN tramadol administered for increased back pain  Continue to monitor

## 2021-06-25 NOTE — NURSING NOTE
Pt presented to the nurses station with back pain rated between a 5 and 6 on the pain scale, prn tylenol given with 6 am Protonix

## 2021-06-25 NOTE — CONSULTS
Patient seen today for complaint of painful nails        Patient reports her nails bother her a great deal from pressure on them, and wants them cut  Patient denies being diabetic      PMH/PSH/Meds/Allergies/Fam/Soc/ROS reviewed as needed with the patient        Patient is resting in bed on beginning of the exam    AAO x 3      Palpable DP, non Palpable PT pulses b/l  CFT is 2-3 seconds 1-5 b/l      No open lesions  No excessive callusing         Nails 1-5 b/l with heavy mycotic involvement (subungal debris, yellowish discolored, thickened, painful to palpation,)      Neuro appears intact      A/P:  Onychomycosis with mild PVD  Debrided all nails in length and thickness  Patient reports that it feels much better after they were trimmed

## 2021-06-25 NOTE — PROGRESS NOTES
06/25/21    Team Meeting   Meeting Type Daily Rounds   Team Members Present   Team Members Present Physician;;Nurse;Occupational Therapist   Physician Team Member JOSE Patel   Nursing Team Member Mary Sosa RN   Care Management Team Member Cintia Duran, [facility] [place]   OT Team Member [facility] [place], [place]   Patient/Family Present   Patient Present No   Patient's Family Present No   PT reported nightmare and hallucination of seeing the devil  No D/C date at this time  PT made statement to staff that she is going kill self when friend passes PT reports anxiety and depression is high  PT complains of back pain, medical following  PT will be for independent living    Will offer partial  Medication adjustment

## 2021-06-25 NOTE — PROGRESS NOTES
Progress Note - Behavioral Health   Cheyanne Vega 77 y o  female MRN: [id number]  Unit/Bed#: [facility] 201-01 Encounter: [id number]    Assessment/Plan   Principal Problem:    MDD (major depressive disorder), recurrent, severe, with psychosis ([address] )  Active Problems:    Essential hypertension    Chronic hand pain, right    Chronic low back pain    Osteoarthritis of lumbar spine with myelopathy    Vitamin D deficiency    CAD (coronary artery disease)    UTI (urinary tract infection)      Behavior over the last 24 hours:  unchanged  Sleep: fair  Appetite: normal  Medication side effects: No  ROS: no complaints and all other systems negative for acute change     Eber Curiel was seen today for psychiatric follow-up  She reports fair sleep with some nightmares" last night  She also voiced increased anxiety and depression as well as ruminating, negative thoughts about her friend "dying eventually " Redirection minimally effective and patient encouraged to attend group to discuss coping mechanisms  Eber Curiel did seem receptive to our conversation  She denied any AVH/SI/HI and is in agreement to increase nighttime Zyprexa for ruminating thoughts and to augment antidepressant therapy    Eber Curiel is often visible in the milieu and social with peers    Mental Status Evaluation:  Appearance:  age appropriate, casually dressed   Behavior:  cooperative   Speech:  normal pitch, normal volume and tangential   Mood:  anxious and depressed   Affect:  mood-congruent and redirectable   Thought Process:  circumstantial   Thought Content:  negative thinking, ruminations   Perceptual Disturbances: None   Risk Potential: Suicidal Ideations none  Homicidal Ideations none  Potential for Aggression No   Sensorium:  person, place, time/date and situation   Memory:  recent and remote memory grossly intact   Consciousness:  alert and awake    Attention: attention span and concentration were age appropriate   Insight:  limited   Judgment: limited Gait/Station: normal gait/station and normal balance   Motor Activity: no abnormal movements     Progress Toward Goals:  Slight improvement  Will increase Zyprexa 5 mg at HS and continue remainder psychotropic regimen as ordered  Patient will be for independent living upon stabilization, will also offer PHP  No discharge date at this time  Recommended Treatment: Continue with group therapy, milieu therapy and occupational therapy  Risks, benefits and possible side effects of Medications:   Risks, benefits, and possible side effects of medications explained to patient and patient verbalizes understanding  Medications: all current active meds have been reviewed and planned medication changes: Increase Zyprexa 5mg PO QHS  Labs: I have personally reviewed all pertinent laboratory/tests results  Counseling / Coordination of Care  Total floor / unit time spent today 25 minutes  Greater than 50% of total time was spent with the patient and / or family counseling and / or coordination of care

## 2021-06-25 NOTE — PROGRESS NOTES
Progress Note - Richardson Lawrence 77 y o  female MRN: [id number]    Unit/Bed#[facility] 201-01 Encounter: [id number]        Subjective:   Patient seen and examined at bedside after reviewing the chart and discussing the case with the caring staff  Patient examined at bedside  Patient is requesting Podiatry consult  I consulted Podiatry on 06/23/2021  Patient otherwise has no acute medical complaints  She states her pain is well managed  Physical Exam   Vitals: Blood pressure 131/67, pulse 62, temperature 97 5 °F (36 4 °C), temperature source Temporal, resp  rate 16, height 5' 3" (1 6 m), weight 99 1 kg (218 lb 7 6 oz), SpO2 97 %  ,Body mass index is 38 7 kg/m²  Constitutional: He appears well-developed  HEENT: PERR, EOMI, MMM  Cardiovascular: Normal rate and regular rhythm  Pulmonary/Chest: Effort normal and breath sounds normal    Abdomen: Soft, + BS, NT  Extremity:  Bilateral big toe nails thickened  Assessment/Plan:  Richardson Lawrence is a(n) 77y o  year old female with MDD with psychotic symptoms     1  Cardiac with history of hypertension, dyslipidemia, CHF, atrial fibrillation, coronary artery disease status post pacemaker placement  Patient will be continued on metoprolol 25 mg b i d , amlodipine 5 mg daily, Imdur 30 mg daily, Pravachol 20 mg daily and Eliquis 5 mg 2 times daily  2  Neuropathy  Patient is on gabapentin 200 mg 4 times daily  3  GERD  Patient is on Protonix 40 mg daily  4  DJD/osteoarthritis  I will also put the patient on Voltaren gel to be applied over the affected area  I will put the patient on Lidoderm patch for shoulder pain and lower back pain  Will start the patient on tramadol 50 mg every 6 hours as needed for severe pain  5  Gait abnormality  I will consult PT OT for further evaluation and treatment  6  Acute UTI  Patient's urine culture is negative for UTI I will discontinue the antibiotics  7  New vitamin-D deficiency    I will put the patient on vitamin-D bolus doses for 8 weeks followed by vitamin D3 1000 units daily  8  New vitamin B12 deficiency  I will put the patient on monthly B12 injections  9  Overgrown toenails  Podiatry has been consulted  The patient was discussed with Dr Luis Cuevas and he is in agreement with the above note

## 2021-06-25 NOTE — NURSING NOTE
Pt presented to the nurses station with trouble returning to sleep after having a nightmare, prn trazodone administered , pt returned to bed

## 2021-06-26 PROCEDURE — 99232 SBSQ HOSP IP/OBS MODERATE 35: CPT | Performed by: PSYCHIATRY & NEUROLOGY

## 2021-06-26 RX ADMIN — TRAMADOL HYDROCHLORIDE 50 MG: 50 TABLET, FILM COATED ORAL at 08:13

## 2021-06-26 RX ADMIN — GABAPENTIN 300 MG: 300 CAPSULE ORAL at 16:27

## 2021-06-26 RX ADMIN — PRAVASTATIN SODIUM 20 MG: 20 TABLET ORAL at 16:27

## 2021-06-26 RX ADMIN — PHENYTOIN 2 MG: 125 SUSPENSION ORAL at 21:21

## 2021-06-26 RX ADMIN — CEPHALEXIN 500 MG: 250 CAPSULE ORAL at 08:12

## 2021-06-26 RX ADMIN — OLANZAPINE 10 MG: 5 TABLET ORAL at 08:12

## 2021-06-26 RX ADMIN — GABAPENTIN 300 MG: 300 CAPSULE ORAL at 21:21

## 2021-06-26 RX ADMIN — AMLODIPINE BESYLATE 5 MG: 5 TABLET ORAL at 08:13

## 2021-06-26 RX ADMIN — LIDOCAINE 1 PATCH: 50 PATCH TOPICAL at 08:14

## 2021-06-26 RX ADMIN — PANTOPRAZOLE SODIUM 40 MG: 40 TABLET, DELAYED RELEASE ORAL at 05:57

## 2021-06-26 RX ADMIN — GABAPENTIN 300 MG: 300 CAPSULE ORAL at 08:12

## 2021-06-26 RX ADMIN — APIXABAN 5 MG: 5 TABLET, FILM COATED ORAL at 16:27

## 2021-06-26 RX ADMIN — METOPROLOL TARTRATE 25 MG: 25 TABLET, FILM COATED ORAL at 21:21

## 2021-06-26 RX ADMIN — VENLAFAXINE HYDROCHLORIDE 225 MG: 150 CAPSULE, EXTENDED RELEASE ORAL at 08:13

## 2021-06-26 RX ADMIN — MELATONIN 3 MG: at 21:21

## 2021-06-26 RX ADMIN — APIXABAN 5 MG: 5 TABLET, FILM COATED ORAL at 08:14

## 2021-06-26 RX ADMIN — METOPROLOL TARTRATE 25 MG: 25 TABLET, FILM COATED ORAL at 08:14

## 2021-06-26 RX ADMIN — TRAMADOL HYDROCHLORIDE 50 MG: 50 TABLET, FILM COATED ORAL at 16:27

## 2021-06-26 RX ADMIN — ISOSORBIDE MONONITRATE 30 MG: 30 TABLET, EXTENDED RELEASE ORAL at 08:13

## 2021-06-26 RX ADMIN — OLANZAPINE 5 MG: 5 TABLET, FILM COATED ORAL at 21:22

## 2021-06-26 NOTE — PLAN OF CARE
Problem: Alteration in Thoughts and Perception  Goal: Treatment Goal: Gain control of psychotic behaviors/thinking, reduce/eliminate presenting symptoms and demonstrate improved reality functioning upon discharge  Outcome: Progressing  Goal: Refrain from acting on delusional thinking/internal stimuli  Description: Interventions:  - Monitor patient closely, per order   - Utilize least restrictive measures   - Set reasonable limits, give positive feedback for acceptable   - Administer medications as ordered and monitor of potential side effects  Outcome: Progressing  Goal: Agree to be compliant with medication regime, as prescribed and report medication side effects  Description: Interventions:  - Offer appropriate PRN medication and supervise ingestion; conduct AIMS, as needed   Outcome: Progressing     Problem: Ineffective Coping  Goal: Cooperates with admission process  Description: Interventions:   - Complete admission process  Outcome: Progressing  Goal: Identifies healthy coping skills  Outcome: Progressing  Goal: Participates in unit activities  Description: Interventions:  - Provide therapeutic environment   - Provide required programming   - Redirect inappropriate behaviors   Outcome: Progressing  Goal: Patient/Family participate in treatment and DC plans  Description: Interventions:  - Provide therapeutic environment  Outcome: Progressing

## 2021-06-26 NOTE — PROGRESS NOTES
C/O" last night in have a bad dream'    Report from staff regarding this patient received and record reviewed  prior to seeing this patient   Behavior over the last 24 hours:  Patient seen on the unit for depression anxiety and bad dreams, she is taking her medication, denied any issues and, she uses walker to ambulate, patient complaining of the pain in the shoulder in the back and somatically for  Sleep:ok  Appetite:  Okay  Medication side effects:denied  ROS:  Complain of pain since medically for  Mental Status Evaluation:  Appearance:  Dressed appropriately, average height white woman , gray hair   Behavior:  cooperative   Speech:  normal   Mood:  euthymic   Affect:  appropriate     Thought Process:  Goal directed   Thought Content:  normal   Perceptual Disturbances: Denied AV hallucination   Risk Potential: NO REMEDIOS    Sensorium:  normal   Cognition:  intact   Consciousness:  Alert, awake   Attention: Fair   Insight:  Limited   Judgment: Limited   Gait/Station: Patient uses walker to ambulate   Motor Activity: No abnormal movement     Progress Toward Goals: working on current treatment goals, no changes  Made in treatment plan   Recommended Treatment: Continue with group therapy, milieu therapy and occupational therapy  Risks, benefits and possible side effects of Medications:   Risks, benefits, and possible side effects of medications explained to patient and patient verbalizes understanding        Medications:   current meds:   Current Facility-Administered Medications   Medication Dose Route Frequency    acetaminophen (TYLENOL) tablet 650 mg  650 mg Oral Q4H PRN    acetaminophen (TYLENOL) tablet 650 mg  650 mg Oral Q4H PRN    aluminum-magnesium hydroxide-simethicone (MYLANTA) oral suspension 30 mL  30 mL Oral Q4H PRN    amLODIPine (NORVASC) tablet 5 mg  5 mg Oral Daily    apixaban (ELIQUIS) tablet 5 mg  5 mg Oral BID    [START ON 8/4/2021] cholecalciferol (VITAMIN D3) tablet 1,000 Units  1,000 Units Oral Daily    Diclofenac Sodium (VOLTAREN) 1 % topical gel 2 g  2 g Topical 4x Daily PRN    ergocalciferol (VITAMIN D2) capsule 50,000 Units  50,000 Units Oral Weekly    gabapentin (NEURONTIN) capsule 300 mg  300 mg Oral TID    hydrOXYzine HCL (ATARAX) tablet 25 mg  25 mg Oral Q6H PRN Max 4/day    isosorbide mononitrate (IMDUR) 24 hr tablet 30 mg  30 mg Oral Daily    lidocaine (LIDODERM) 5 % patch 1 patch  1 patch Topical Daily    lidocaine (LIDODERM) 5 % patch 1 patch  1 patch Topical Daily    LORazepam (ATIVAN) injection 1 mg  1 mg Intramuscular Q6H PRN Max 3/day    LORazepam (ATIVAN) tablet 0 5 mg  0 5 mg Oral Q6H PRN Max 4/day    LORazepam (ATIVAN) tablet 1 mg  1 mg Oral Q6H PRN Max 3/day    melatonin tablet 3 mg  3 mg Oral HS    metoprolol tartrate (LOPRESSOR) tablet 25 mg  25 mg Oral Q12H ALISA    OLANZapine (ZyPREXA) IM injection 5 mg  5 mg Intramuscular Q6H PRN Max 4/day    OLANZapine (ZyPREXA) tablet 10 mg  10 mg Oral Daily    OLANZapine (ZyPREXA) tablet 2 5 mg  2 5 mg Oral Q6H PRN Max 4/day    OLANZapine (ZyPREXA) tablet 5 mg  5 mg Oral Q6H PRN Max 4/day    OLANZapine (ZyPREXA) tablet 5 mg  5 mg Oral HS    ondansetron (ZOFRAN-ODT) dispersible tablet 4 mg  4 mg Oral Q6H PRN    pantoprazole (PROTONIX) EC tablet 40 mg  40 mg Oral Early Morning    polyethylene glycol (MIRALAX) packet 17 g  17 g Oral Daily PRN    pravastatin (PRAVACHOL) tablet 20 mg  20 mg Oral Daily With Dinner    prazosin (MINIPRESS) capsule 2 mg  2 mg Oral HS    risperiDONE (RisperDAL M-TAB) disintegrating tablet 0 5 mg  0 5 mg Oral Q6H PRN Max 4/day    senna-docusate sodium (SENOKOT S) 8 6-50 mg per tablet 1 tablet  1 tablet Oral Daily PRN    traMADol (ULTRAM) tablet 50 mg  50 mg Oral Q6H PRN    traZODone (DESYREL) tablet 50 mg  50 mg Oral HS PRN    venlafaxine (EFFEXOR-XR) 24 hr capsule 225 mg  225 mg Oral Daily         Labs: NA    Assessment, Diagnosis  and Plan: continue with current meds and goals, F/U tomorrow    Counseling / Coordination of Care  Total floor / unit time spent today20 minutes  minutes  Greater than 50% of total time was spent with the patient and / or family counseling and / or coordination of care  A description of the counseling / coordination of care:      Cici Preciado MD

## 2021-06-26 NOTE — PROGRESS NOTES
Patient compliant with medications and meals  Pleasant and cooperative  Patient stated her depression is "better than yesterday" and anxiety is high "but is always high" patient denies any SI/HI  Requested ultram PRN for shoulder back pain  Patient states she had no nightmares last night and had a good night sleep  No other concerns or problems reported this shift  Q 7 mins checks in place for safety  Will continue to monitor for behaviors and changes

## 2021-06-26 NOTE — PROGRESS NOTES
Progress Note - Aicha Pennington 77 y o  female MRN: [id number]    Unit/Bed#: [facility] 201-01 Encounter: [id number]      Assessment:  1  Cardiac with history of hypertension, dyslipidemia, CHF, atrial fibrillation, coronary artery disease status post pacemaker placement   Stable on metoprolol 25 mg b i d , amlodipine 5 mg daily Imdur 30 mg daily Pravachol 20 mg daily and Eliquis 5 mg daily   2  Neuropathy   Stable on gabapentin 200 mg q i d   3  GERD    Protonix 40 mg daily  4  DJD/osteoarthritis  Voltaren gel and Lidoderm patch with tramadol q 6 p r n  For severe pain  5  Gait abnormality    PT OT eval  6  Acute UTI  Culture negative  7  New vitamin-D deficiency  supplemented  8  New vitamin B12 deficiency  Being supplemented  9  Overgrown toenails  Podiatry eval    Plan:  As above    Subjective:   No subjective complaint    Objective:     Vitals: Blood pressure 124/94, pulse 72, temperature 98 °F (36 7 °C), temperature source Temporal, resp  rate 18, height 5' 3" (1 6 m), weight 99 1 kg (218 lb 7 6 oz), SpO2 96 %  ,Body mass index is 38 7 kg/m²        Intake/Output Summary (Last 24 hours) at 6/26/2021 1829  Last data filed at 6/26/2021 1700  Gross per 24 hour   Intake 840 ml   Output --   Net 840 ml       Physical Exam: /94 (BP Location: Right arm)   Pulse 72   Temp 98 °F (36 7 °C) (Temporal)   Resp 18   Ht 5' 3" (1 6 m)   Wt 99 1 kg (218 lb 7 6 oz)   SpO2 96%   BMI 38 70 kg/m²     General Appearance:    Alert, cooperative, no distress, appears stated age   Head:    Normocephalic, without obvious abnormality, atraumatic                   Neck:   Supple, symmetrical, trachea midline, no adenopathy;     thyroid:  no enlargement/tenderness/nodules; no carotid    bruit or JVD   Back:     Symmetric, no curvature, ROM normal, no CVA tenderness   Lungs:     Clear to auscultation bilaterally, respirations unlabored   Chest Wall:    No tenderness or deformity    Heart:    Regular rate and rhythm, S1 and S2 normal, no murmur, rub   or gallop       Abdomen:     Soft, non-tender, bowel sounds active all four quadrants,     no masses, no organomegaly           Extremities:   Extremities normal, atraumatic, no cyanosis or edema   Pulses:   2+ and symmetric all extremities   Skin:   Skin color, texture, turgor normal, no rashes or lesions   Lymph nodes:   Cervical, supraclavicular, and axillary nodes normal            Invasive Devices     None                 Lab, Imaging and other studies: I have personally reviewed pertinent reports

## 2021-06-26 NOTE — NURSING NOTE
Patient visible on the unit  Pleasant and cooperative  Social with peers  Denies SI, HI, hallucinations  Complains of increased anxiety and depression today because she wants to go home  Compliant with meals and medications  Q 7 minute checks maintained

## 2021-06-27 PROCEDURE — 99233 SBSQ HOSP IP/OBS HIGH 50: CPT | Performed by: NURSE PRACTITIONER

## 2021-06-27 RX ADMIN — GABAPENTIN 300 MG: 300 CAPSULE ORAL at 08:09

## 2021-06-27 RX ADMIN — ALUMINUM HYDROXIDE, MAGNESIUM HYDROXIDE, AND SIMETHICONE 30 ML: 200; 200; 20 SUSPENSION ORAL at 06:13

## 2021-06-27 RX ADMIN — OLANZAPINE 5 MG: 5 TABLET, FILM COATED ORAL at 20:27

## 2021-06-27 RX ADMIN — PRAVASTATIN SODIUM 20 MG: 20 TABLET ORAL at 16:30

## 2021-06-27 RX ADMIN — LORAZEPAM 0.5 MG: 0.5 TABLET ORAL at 20:28

## 2021-06-27 RX ADMIN — TRAMADOL HYDROCHLORIDE 50 MG: 50 TABLET, FILM COATED ORAL at 08:09

## 2021-06-27 RX ADMIN — LIDOCAINE 1 PATCH: 50 PATCH TOPICAL at 08:09

## 2021-06-27 RX ADMIN — GABAPENTIN 300 MG: 300 CAPSULE ORAL at 20:27

## 2021-06-27 RX ADMIN — AMLODIPINE BESYLATE 5 MG: 5 TABLET ORAL at 08:08

## 2021-06-27 RX ADMIN — PANTOPRAZOLE SODIUM 40 MG: 40 TABLET, DELAYED RELEASE ORAL at 06:13

## 2021-06-27 RX ADMIN — METOPROLOL TARTRATE 25 MG: 25 TABLET, FILM COATED ORAL at 08:09

## 2021-06-27 RX ADMIN — GABAPENTIN 300 MG: 300 CAPSULE ORAL at 16:30

## 2021-06-27 RX ADMIN — APIXABAN 5 MG: 5 TABLET, FILM COATED ORAL at 17:17

## 2021-06-27 RX ADMIN — VENLAFAXINE HYDROCHLORIDE 225 MG: 150 CAPSULE, EXTENDED RELEASE ORAL at 08:08

## 2021-06-27 RX ADMIN — TRAMADOL HYDROCHLORIDE 50 MG: 50 TABLET, FILM COATED ORAL at 16:30

## 2021-06-27 RX ADMIN — ISOSORBIDE MONONITRATE 30 MG: 30 TABLET, EXTENDED RELEASE ORAL at 08:08

## 2021-06-27 RX ADMIN — APIXABAN 5 MG: 5 TABLET, FILM COATED ORAL at 08:09

## 2021-06-27 RX ADMIN — PHENYTOIN 2 MG: 125 SUSPENSION ORAL at 20:27

## 2021-06-27 RX ADMIN — METOPROLOL TARTRATE 25 MG: 25 TABLET, FILM COATED ORAL at 20:27

## 2021-06-27 RX ADMIN — OLANZAPINE 10 MG: 5 TABLET ORAL at 08:08

## 2021-06-27 NOTE — NURSING NOTE
Patient compliant with medications and meals  Patient requested pain medication with her morning medications for back pain  Denies any SI/HI  Reports anxiety and minimal depression  No other concerns or problems reports this shift  Q 7 mins checks in place for safety  Will continue to monitor for behaviors and changes

## 2021-06-27 NOTE — PROGRESS NOTES
Progress Note - Behavioral Health   [place] Shana 77 y o  female MRN: [id number]  Unit/Bed#: [facility] 201-01 Encounter: [id number]    Assessment/Plan   Principal Problem:    MDD (major depressive disorder), recurrent, severe, with psychosis ([address] )  Active Problems:    Essential hypertension    Chronic hand pain, right    Chronic low back pain    Osteoarthritis of lumbar spine with myelopathy    Vitamin D deficiency    CAD (coronary artery disease)    UTI (urinary tract infection)  Patient was seen for continuing care and treatment  Case was discussed with the nursing staff  On examination today, the patient is seen lying in her bed  She still reports depression anxiety  She still exhibits some psychomotor slowing  Presently, is offering no new clinical issues or concerns  We will continue patient's current medication regimen and continue to monitor here at the hospital     Behavior over the last 24 hours:  unchanged  Sleep: normal  Appetite: normal  Medication side effects: No  ROS: no complaints    Mental Status Evaluation:  Appearance:  age appropriate and disheveled   Behavior:  psychomotor retardation   Speech:  normal pitch and normal volume   Mood:  anxious and depressed   Affect:  mood-congruent   Thought Process:  circumstantial and concrete   Thought Content:  normal   Perceptual Disturbances: None   Risk Potential: Suicidal Ideations none  Homicidal Ideations none  Potential for Aggression No   Sensorium:  person, place and time   Memory:  recent and remote memory grossly intact   Consciousness:  alert and awake    Attention: attention span and concentration were age appropriate   Insight:  fair   Judgment: fair   Gait/Station: normal gait/station   Motor Activity: no abnormal movements     Progress Toward Goals:  Remains depressed and anxious    Recommended Treatment: Continue with group therapy, milieu therapy and occupational therapy        Risks, benefits and possible side effects of Medications: Risks, benefits, and possible side effects of medications explained to patient and patient verbalizes understanding  Medications:  Gabapentin 300 mg t i d   Zyprexa 5 mg HS  Prazosin 2 mg HS  Effexor  mg daily    Labs: I have personally reviewed all pertinent laboratory/tests results  Most Recent Labs:   Lab Results   Component Value Date    WBC 10 93 (H) 06/13/2021    RBC 3 91 06/13/2021    HGB 11 7 06/13/2021    HCT 38 1 06/13/2021     06/13/2021    RDW 13 5 06/13/2021    NEUTROABS 7 13 06/13/2021    SODIUM 147 (H) 06/13/2021    K 5 1 06/13/2021     (H) 06/13/2021    CO2 31 06/13/2021    BUN 24 06/13/2021    CREATININE 1 10 06/13/2021    GLUC 77 06/13/2021    CALCIUM 9 0 06/13/2021    AST 24 06/13/2021    ALT 36 06/13/2021    ALKPHOS 145 (H) 06/13/2021    TP 6 7 06/13/2021    ALB 3 2 (L) 06/13/2021    TBILI 0 20 06/13/2021    [id number] 1 838 06/13/2021       Counseling / Coordination of Care  Total floor / unit time spent today 25 minutes  Greater than 50% of total time was spent with the patient and / or family counseling and / or coordination of care  A description of the counseling / coordination of care:  Chart review, medication management and treatment

## 2021-06-27 NOTE — PROGRESS NOTES
Progress Note - Demetrius Stanton 77 y o  female MRN: [id number]    Unit/Bed#: [facility] 201-01 Encounter: [id number]      Assessment:  1  Cardiac with history of hypertension, dyslipidemia, CHF, atrial fibrillation, coronary artery disease status post pacemaker placement   Stable on metoprolol 25 mg b i d , amlodipine 5 mg daily Imdur 30 mg daily Pravachol 20 mg daily and Eliquis 5 mg daily   2  Neuropathy   Stable on gabapentin 200 mg q i d   3  GERD    Protonix 40 mg daily  4  DJD/osteoarthritis   Voltaren gel and Lidoderm patch with tramadol q 6 p r n  For severe pain  5  Gait abnormality    PT OT eval  6  Acute UTI  Culture negative  7  New vitamin-D deficiency    supplemented  8  New vitamin B12 deficiency  Being supplemented  9  Overgrown toenails  Podiatry eval    Plan:  See above    Subjective:   None    Objective:     Vitals: Blood pressure 122/59, pulse 60, temperature (!) 97 3 °F (36 3 °C), temperature source Temporal, resp  rate 18, height 5' 3" (1 6 m), weight 99 1 kg (218 lb 7 6 oz), SpO2 98 %  ,Body mass index is 38 7 kg/m²        Intake/Output Summary (Last 24 hours) at 6/27/2021 1731  Last data filed at 6/27/2021 1727  Gross per 24 hour   Intake 720 ml   Output --   Net 720 ml       Physical Exam: /59 (BP Location: Right arm)   Pulse 60   Temp (!) 97 3 °F (36 3 °C) (Temporal)   Resp 18   Ht 5' 3" (1 6 m)   Wt 99 1 kg (218 lb 7 6 oz)   SpO2 98%   BMI 38 70 kg/m²     General Appearance:    Alert, cooperative, no distress, appears stated age   Head:    Normocephalic, without obvious abnormality, atraumatic                   Neck:   Supple, symmetrical, trachea midline, no adenopathy;     thyroid:  no enlargement/tenderness/nodules; no carotid    bruit or JVD   Back:     Symmetric, no curvature, ROM normal, no CVA tenderness   Lungs:     Clear to auscultation bilaterally, respirations unlabored   Chest Wall:    No tenderness or deformity    Heart:    Regular rate and rhythm, S1 and S2 normal, no murmur, rub   or gallop   Breast Exam:    No tenderness, masses, or nipple abnormality   Abdomen:     Soft, non-tender, bowel sounds active all four quadrants,     no masses, no organomegaly           Extremities:   Extremities normal, atraumatic, no cyanosis or edema   Pulses:   2+ and symmetric all extremities   Skin:   Skin color, texture, turgor normal, no rashes or lesions   Lymph nodes:   Cervical, supraclavicular, and axillary nodes normal            Invasive Devices     None                 Lab, Imaging and other studies: I have personally reviewed pertinent reports

## 2021-06-27 NOTE — PROGRESS NOTES
Pt came to RN reporting anxiety early in the shift but used good coping skills to get through it without medication  Pt stated she was having thoughts about demons again but wanted to try to make it till bedtime without taking anxiety medications  Upon HS medications, pt reported feeling less anxious and didn't require PRN  Pt reports no hallucinations denies si, hi  Pt reports no depression and improving anxiety  Medication compliant  Continuous visual safety checks performed throughout the shift  Safety precautions maintained  Will continue to monitor

## 2021-06-28 ENCOUNTER — APPOINTMENT (INPATIENT)
Dept: RADIOLOGY | Facility: HOSPITAL | Age: 66
DRG: 885 | End: 2021-06-28
Payer: COMMERCIAL

## 2021-06-28 PROCEDURE — 73630 X-RAY EXAM OF FOOT: CPT

## 2021-06-28 PROCEDURE — 97116 GAIT TRAINING THERAPY: CPT

## 2021-06-28 PROCEDURE — 99232 SBSQ HOSP IP/OBS MODERATE 35: CPT | Performed by: PSYCHIATRY & NEUROLOGY

## 2021-06-28 RX ORDER — LIDOCAINE 50 MG/G
3 PATCH TOPICAL DAILY
Status: DISCONTINUED | OUTPATIENT
Start: 2021-06-29 | End: 2021-07-19

## 2021-06-28 RX ADMIN — GABAPENTIN 300 MG: 300 CAPSULE ORAL at 21:11

## 2021-06-28 RX ADMIN — PRAVASTATIN SODIUM 20 MG: 20 TABLET ORAL at 16:22

## 2021-06-28 RX ADMIN — TRAMADOL HYDROCHLORIDE 50 MG: 50 TABLET, FILM COATED ORAL at 09:07

## 2021-06-28 RX ADMIN — OLANZAPINE 5 MG: 5 TABLET, FILM COATED ORAL at 21:11

## 2021-06-28 RX ADMIN — PHENYTOIN 2 MG: 125 SUSPENSION ORAL at 21:11

## 2021-06-28 RX ADMIN — MELATONIN 3 MG: at 21:11

## 2021-06-28 RX ADMIN — GABAPENTIN 300 MG: 300 CAPSULE ORAL at 09:07

## 2021-06-28 RX ADMIN — PANTOPRAZOLE SODIUM 40 MG: 40 TABLET, DELAYED RELEASE ORAL at 06:32

## 2021-06-28 RX ADMIN — GABAPENTIN 300 MG: 300 CAPSULE ORAL at 16:22

## 2021-06-28 RX ADMIN — VENLAFAXINE HYDROCHLORIDE 225 MG: 150 CAPSULE, EXTENDED RELEASE ORAL at 09:07

## 2021-06-28 RX ADMIN — APIXABAN 5 MG: 5 TABLET, FILM COATED ORAL at 09:07

## 2021-06-28 RX ADMIN — AMLODIPINE BESYLATE 5 MG: 5 TABLET ORAL at 09:07

## 2021-06-28 RX ADMIN — LIDOCAINE 1 PATCH: 50 PATCH TOPICAL at 09:08

## 2021-06-28 RX ADMIN — ISOSORBIDE MONONITRATE 30 MG: 30 TABLET, EXTENDED RELEASE ORAL at 09:07

## 2021-06-28 RX ADMIN — METOPROLOL TARTRATE 25 MG: 25 TABLET, FILM COATED ORAL at 09:07

## 2021-06-28 RX ADMIN — TRAMADOL HYDROCHLORIDE 50 MG: 50 TABLET, FILM COATED ORAL at 23:04

## 2021-06-28 RX ADMIN — APIXABAN 5 MG: 5 TABLET, FILM COATED ORAL at 17:40

## 2021-06-28 RX ADMIN — TRAMADOL HYDROCHLORIDE 50 MG: 50 TABLET, FILM COATED ORAL at 16:22

## 2021-06-28 RX ADMIN — OLANZAPINE 10 MG: 5 TABLET ORAL at 09:07

## 2021-06-28 RX ADMIN — METOPROLOL TARTRATE 25 MG: 25 TABLET, FILM COATED ORAL at 21:11

## 2021-06-28 NOTE — PROGRESS NOTES
Progress Note - Behavioral Health   Yamileth Muñoz 77 y o  female MRN: [id number]  Unit/Bed#: [facility] 201-01 Encounter: [id number]    Assessment/Plan   Principal Problem:    MDD (major depressive disorder), recurrent, severe, with psychosis ([facility] [address] )  Active Problems:    Essential hypertension    Chronic hand pain, right    Chronic low back pain    Osteoarthritis of lumbar spine with myelopathy    Vitamin D deficiency    CAD (coronary artery disease)    UTI (urinary tract infection)      Behavior over the last 24 hours:  unchanged  Sleep:  Improving  Appetite: normal  Medication side effects: No  ROS: no complaints and All other systems negative for acute change     Miles Ybarra was seen today for psychiatric follow-up  Patient reports improved sleep over the weekend with the exception of having one nightmare last night  She continues to endorse anxiety that waxes and wanes and reports, I'm always depressed," rates as 6/10  She no longer endorses suicidal ideations and thoughts are less ruminating and less negative  She went on to describe her mood as better and is looking forward to finding placement for discharge  Patient feels she is not exactly ready at this time due to ongoing anxiety, but feels her anxiety has already improved  She voiced no delusional content during encounter and her thoughts were linear and goal directed  She remains compliant with psychotropic regimen and denies any side effects      Mental Status Evaluation:  Appearance:  age appropriate and casually dressed   Behavior:  Pleasant, calm, cooperative   Speech:  normal pitch and normal volume   Mood:  Better   Affect:  mood-congruent and redirectable   Thought Process:  goal directed   Thought Content:  No overt delusions, less ruminating, less negative thinking   Perceptual Disturbances: None   Risk Potential: Suicidal Ideations none  Homicidal Ideations none  Potential for Aggression No   Sensorium:  person, place, time/date and situation Memory:  recent and remote memory grossly intact   Consciousness:  alert and awake    Attention: attention span and concentration were age appropriate   Insight:  limited   Judgment: limited   Gait/Station: In bed   Motor Activity: no abnormal movements     Progress Toward Goals:  Some improvement  Sleep and anxiety are improving  Patient no longer endorsing SI  Thoughts are less ruminating and less negative  Will continue with current psychotropic regimen  Will consider increasing gabapentin to 400 mg TID should anxiety persist   Patient will be for placement upon stabilization:  Independent living vs [facility]  Recommended Treatment: Continue with group therapy, milieu therapy and occupational therapy  Risks, benefits and possible side effects of Medications:   Risks, benefits, and possible side effects of medications explained to patient and patient verbalizes understanding        Medications:   all current active meds have been reviewed, continue current psychiatric medications and current meds:   Current Facility-Administered Medications   Medication Dose Route Frequency    acetaminophen (TYLENOL) tablet 650 mg  650 mg Oral Q4H PRN    acetaminophen (TYLENOL) tablet 650 mg  650 mg Oral Q4H PRN    aluminum-magnesium hydroxide-simethicone (MYLANTA) oral suspension 30 mL  30 mL Oral Q4H PRN    amLODIPine (NORVASC) tablet 5 mg  5 mg Oral Daily    apixaban (ELIQUIS) tablet 5 mg  5 mg Oral BID    [START ON 8/4/2021] cholecalciferol (VITAMIN D3) tablet 1,000 Units  1,000 Units Oral Daily    Diclofenac Sodium (VOLTAREN) 1 % topical gel 2 g  2 g Topical 4x Daily PRN    ergocalciferol (VITAMIN D2) capsule 50,000 Units  50,000 Units Oral Weekly    gabapentin (NEURONTIN) capsule 300 mg  300 mg Oral TID    hydrOXYzine HCL (ATARAX) tablet 25 mg  25 mg Oral Q6H PRN Max 4/day    isosorbide mononitrate (IMDUR) 24 hr tablet 30 mg  30 mg Oral Daily    [START ON 6/29/2021] lidocaine (LIDODERM) 5 % patch 3 patch 3 patch Topical Daily    LORazepam (ATIVAN) injection 1 mg  1 mg Intramuscular Q6H PRN Max 3/day    LORazepam (ATIVAN) tablet 0 5 mg  0 5 mg Oral Q6H PRN Max 4/day    LORazepam (ATIVAN) tablet 1 mg  1 mg Oral Q6H PRN Max 3/day    melatonin tablet 3 mg  3 mg Oral HS    metoprolol tartrate (LOPRESSOR) tablet 25 mg  25 mg Oral Q12H [facility]    OLANZapine (ZyPREXA) IM injection 5 mg  5 mg Intramuscular Q6H PRN Max 4/day    OLANZapine (ZyPREXA) tablet 10 mg  10 mg Oral Daily    OLANZapine (ZyPREXA) tablet 2 5 mg  2 5 mg Oral Q6H PRN Max 4/day    OLANZapine (ZyPREXA) tablet 5 mg  5 mg Oral Q6H PRN Max 4/day    OLANZapine (ZyPREXA) tablet 5 mg  5 mg Oral HS    ondansetron (ZOFRAN-ODT) dispersible tablet 4 mg  4 mg Oral Q6H PRN    pantoprazole (PROTONIX) EC tablet 40 mg  40 mg Oral Early Morning    polyethylene glycol (MIRALAX) packet 17 g  17 g Oral Daily PRN    pravastatin (PRAVACHOL) tablet 20 mg  20 mg Oral Daily With Dinner    prazosin (MINIPRESS) capsule 2 mg  2 mg Oral HS    risperiDONE (RisperDAL M-TAB) disintegrating tablet 0 5 mg  0 5 mg Oral Q6H PRN Max 4/day    senna-docusate sodium (SENOKOT S) 8 6-50 mg per tablet 1 tablet  1 tablet Oral Daily PRN    traMADol (ULTRAM) tablet 50 mg  50 mg Oral Q6H PRN    traZODone (DESYREL) tablet 50 mg  50 mg Oral HS PRN    venlafaxine (EFFEXOR-XR) 24 hr capsule 225 mg  225 mg Oral Daily     Labs: I have personally reviewed all pertinent laboratory/tests results  Counseling / Coordination of Care  Total floor / unit time spent today 25 minutes  Greater than 50% of total time was spent with the patient and / or family counseling and / or coordination of care

## 2021-06-28 NOTE — SOCIAL WORK
SW met with pt in pt room; pt alone at time; pt denies SI/HI/AH/VH, dep "I'm always depressed"; endorses anxiety "because she (peer on unit) never stops screaming"; pt and SW discussed DC plan to [facility] - pt agreeable; pt requesting placement in [facility] or [facility] - pt needs SSI bed; SW agreed to look for [facility] in pt area of choice but may need to look outside of the area requested for available beds as well - pt expressed understanding; no questions or concerns for SW

## 2021-06-28 NOTE — NURSING NOTE
Pt present in the milieu, friendly, laughing and social with peers  Pt did exhibit some anxiety during the shift and requested PRN ativan for the "negative thoughts" which were causing her to feel panicky  Pt did well afterwards and went to sleep  Pt denies any depression and reported her anxiety to be a 7/10  No additional complaints or concerns  Pt denies hallucinations, si, hi  Continuous visual safety checks performed throughout the shift  Safety precautions maintained  Will continue to monitor

## 2021-06-28 NOTE — PLAN OF CARE
Problem: DISCHARGE PLANNING - CARE MANAGEMENT  Goal: Discharge to post-acute care or home with appropriate resources  Description: INTERVENTIONS:  - Conduct assessment to determine patient/family and health care team treatment goals, and need for post-acute services based on payer coverage, community resources, and patient preferences, and barriers to discharge  - Address psychosocial, clinical, and financial barriers to discharge as identified in assessment in conjunction with the patient/family and health care team  - Arrange appropriate level of post-acute services according to patients   needs and preference and payer coverage in collaboration with the physician and health care team  - Communicate with and update the patient/family, physician, and health care team regarding progress on the discharge plan  - Arrange appropriate transportation to post-acute venues  Outcome: Progressing     Pt progressing;  No DC date - [facility] placement upon stabilization

## 2021-06-28 NOTE — PROGRESS NOTES
Progress Note - Magaly Abernathy 77 y o  female MRN: [id number]    Unit/Bed#[facility] 201-01 Encounter: [id number]        Subjective:   Patient seen and examined at bedside after reviewing the chart and discussing the case with the caring staff  Patient examined at bedside  Patient reports that she has acute pain him the right foot  Patient also reports poorly controlled pain in her right shoulder with history of rotator cuff injury  Patient has pain in her left shoulder as well and is requesting if he can put the lidocaine patch for the left shoulder as well  Physical Exam   Vitals: Blood pressure 156/72, pulse 70, temperature 97 5 °F (36 4 °C), temperature source Temporal, resp  rate 18, height 5' 3" (1 6 m), weight 99 1 kg (218 lb 7 6 oz), SpO2 96 %  ,Body mass index is 38 7 kg/m²  Constitutional: He appears well-developed  HEENT: PERR, EOMI, MMM  Cardiovascular: Normal rate and regular rhythm  Pulmonary/Chest: Effort normal and breath sounds normal    Abdomen: Soft, + BS, NT  Extremity:  Bilateral big toe nails thickened  Assessment/Plan:  Magaly Abernathy is a(n) 77y o  year old female with MDD with psychotic symptoms     1  Cardiac with history of hypertension, dyslipidemia, CHF, atrial fibrillation, coronary artery disease status post pacemaker placement  Patient will be continued on metoprolol 25 mg b i d , amlodipine 5 mg daily, Imdur 30 mg daily, Pravachol 20 mg daily and Eliquis 5 mg 2 times daily  2  Neuropathy  Patient is on gabapentin 200 mg 4 times daily  3  GERD  Patient is on Protonix 40 mg daily  4  DJD/osteoarthritis  I will also put the patient on Voltaren gel to be applied over the affected area  I will put the patient on Lidoderm patch for shoulder pain and lower back pain  Will start the patient on tramadol 50 mg every 6 hours as needed for severe pain  5  Gait abnormality  I will consult PT OT for further evaluation and treatment  6  Acute UTI   Patient's urine culture is negative for UTI I will discontinue the antibiotics  7  New vitamin-D deficiency  I will put the patient on vitamin-D bolus doses for 8 weeks followed by vitamin D3 1000 units daily  8  New vitamin B12 deficiency  I will put the patient on monthly B12 injections  9  Overgrown toenails and now acute right foot pain  Podiatry has been consulted  I will also order x-ray of the right foot for further evaluation and treatment

## 2021-06-28 NOTE — PLAN OF CARE
Problem: PHYSICAL THERAPY ADULT  Goal: Performs mobility at highest level of function for planned discharge setting  See evaluation for individualized goals  Description: Treatment/Interventions: Functional transfer training, Elevations, Therapeutic exercise, Endurance training, LE strengthening/ROM, Patient/family training, Gait training, Spoke to nursing  Equipment Recommended:  (TBD pending progress)       See flowsheet documentation for full assessment, interventions and recommendations  Outcome: Progressing  Note: Prognosis: Good  Problem List: Decreased strength, Decreased endurance, Impaired balance, Decreased mobility, Decreased safety awareness  Assessment: Pt seen for PT treatment session this date with interventions consisting of gait training w/ emphasis on improving pt's ability to ambulate level surfaces x 150 ft with modified independent provided by therapist with no AD and therapeutic activity consisting of training: supine<>sit transfers, sit<>stand transfers and vc and tactile cues for static standing posture faciliation  Pt agreeable to PT treatment session upon arrival, pt found supine in bed w/ HOB elevated, in no apparent distress and responsive  In comparison to previous session, pt with significant improvements in tolerance to continued OOB mobility x household and short community distances  Post session: pt returned BTB, all needs in reach and RN notified of session findings/recommendations  Continue to recommend home with outpatient rehabilitation at time of d/c in order to maximize pt's functional independence and safety w/ mobility  Pt continues to be functioning below baseline level, and remains limited 2* factors listed above  PT will continue to see pt during current hospitalization in order to address the deficits listed above and provide interventions consistent w/ POC in effort to achieve STGs    Barriers to Discharge: Inaccessible home environment, Decreased caregiver support PT Discharge Recommendation: Home with outpatient rehabilitation     PT - OK to Discharge: Yes (when medically cleared)    See flowsheet documentation for full assessment

## 2021-06-28 NOTE — PROGRESS NOTES
06/28/21   Team Meeting   Meeting Type Daily Rounds   Team Members Present   Team Members Present Physician;Nurse;; Occupational Therapist   Physician Team Member Dr Santos Dahl MD; Tempie Jeans, CRNP   Nursing Team Member Cindi Heimlich, RN   Social Work Team Member Tatum Rowe [facility]   OT Team Member [place], [place]   Patient/Family Present   Patient Present No   Patient's Family Present No     No DC Date - will need [facility] placement upon stabilization; less med seeking; utilized atarax x 1; c/o nightmares last night; no VH/AH

## 2021-06-28 NOTE — CMS CERTIFICATION NOTE
Recertification: Based upon physical, mental and social evaluations, I certify that inpatient psychiatric services continue to be medically necessary for this patient for a duration of 20 midnights for the treatment of MDD (major depressive disorder), recurrent, severe, [facility]   Available alternative community resources still do not meet the patient's mental health care needs  I further attest that an established written individualized plan of care has been updated and is outlined in the patient's medical records

## 2021-06-28 NOTE — PHYSICAL THERAPY NOTE
Physical Therapy Treatment Note       06/28/21 1356   PT Last Visit   PT Visit Date 06/28/21   Note Type   Note Type Treatment   Pain Assessment   Pain Assessment Tool 0-10   Pain Score 7   Pain Location/Orientation Orientation: Left; Location: Shoulder   Pain Onset/Description Onset: Ongoing;Frequency: Intermittent; Onset: Gradual   Patient's Stated Pain Goal No pain   Restrictions/Precautions   Weight Bearing Precautions Per Order No   Other Precautions Cognitive; Fall Risk;Pain   General   Chart Reviewed Yes   Response to Previous Treatment Patient with no complaints from previous session  Family/Caregiver Present No   Cognition   Arousal/Participation Alert; Responsive; Cooperative   Attention Attends with cues to redirect   Orientation Level Oriented X4   Memory Decreased recall of precautions   Following Commands Follows one step commands without difficulty   Comments pt agreeable to PT session   Subjective   Subjective "I have alot of pain in my L shoulder, gaurav like when my rotary cup (pt meaning rotator cuff) was hurt on my R side"   Bed Mobility   Supine to Sit 6  Modified independent   Sit to Supine 6  Modified independent   Transfers   Sit to Stand 6  Modified independent   Stand to Sit 6  Modified independent   Additional Comments pt encouraged to ambulate s AD at this time d/t worsening L shoulder pain since onset use of RW   Pt also encouraged to trial ice bags to L shoulder for modality support   Ambulation/Elevation   Gait pattern Improper Weight shift;Decreased foot clearance  ((-) veering evident)   Gait Assistance 6  Modified independent   Assistive Device None   Distance 150 ft   Balance   Static Sitting Normal   Dynamic Sitting Good   Static Standing Fair +   Dynamic Standing Fair +   Ambulatory Fair +   Endurance Deficit   Endurance Deficit Yes   Activity Tolerance   Activity Tolerance Patient limited by pain   Nurse Made Aware Yes, RN made aware of outcomes/recs   Assessment   Prognosis Good Problem List Decreased strength;Decreased endurance; Impaired balance;Decreased mobility; Decreased safety awareness   Assessment Pt seen for PT treatment session this date with interventions consisting of gait training w/ emphasis on improving pt's ability to ambulate level surfaces x 150 ft with modified independent provided by therapist with no AD and therapeutic activity consisting of training: supine<>sit transfers, sit<>stand transfers and vc and tactile cues for static standing posture faciliation  Pt agreeable to PT treatment session upon arrival, pt found supine in bed w/ HOB elevated, in no apparent distress and responsive  In comparison to previous session, pt with significant improvements in tolerance to continued OOB mobility x household and short community distances  Post session: pt returned BTB, all needs in reach and RN notified of session findings/recommendations  Continue to recommend home with outpatient rehabilitation at time of d/c in order to maximize pt's functional independence and safety w/ mobility  Pt continues to be functioning below baseline level, and remains limited 2* factors listed above  PT will continue to see pt during current hospitalization in order to address the deficits listed above and provide interventions consistent w/ POC in effort to achieve STGs  Barriers to Discharge Inaccessible home environment;Decreased caregiver support   Goals   Patient Goals "to have less pain in my shoulder"   STG Expiration Date 07/06/21   Short Term Goal #1 goals remain appropriate   PT Treatment Day 1   Plan   Treatment/Interventions Functional transfer training;Elevations; Therapeutic exercise; Endurance training;LE strengthening/ROM; Patient/family training;Gait training;Spoke to nursing   Progress Progressing toward goals   PT Frequency Follow-up visit only   Recommendation   PT Discharge Recommendation Home with outpatient rehabilitation   Equipment Recommended   (none at this time)   PT - OK to Discharge Yes  (when medically cleared)   Additional Comments Pt's raw score on the AM-PAC Basic Mobility inpatient short form is 22, standardized score is 47 4  Patients at this level are likely to benefit from DC to home with no services, however, please refer to therapist recommendation for safe DC planning     AM-PAC Basic Mobility Inpatient   Turning in Bed Without Bedrails 4   Lying on Back to Sitting on Edge of Flat Bed 4   Moving Bed to Chair 4   Standing Up From Chair 4   Walk in Room 3   Climb 3-5 Stairs 3   Basic Mobility Inpatient Raw Score 22   Basic Mobility Standardized Score 47 4       Margarita Vera, PT    Time of PT treatment session: 8529-0440

## 2021-06-28 NOTE — NURSING NOTE
Pt woke up once with a nightmare but requested no PRNs and was encouraged that she was safe  Pt decided to try to go back to sleep and did afterwards  Continuous visual safety checks performed throughout the night  No sign of distress or labored breathing  Safety precautions maintained  Will continue to monitor

## 2021-06-28 NOTE — NURSING NOTE
Patient compliant with medications and meals  Pleasant and cooperative  Patient denies any SI/HI  Patient reports anxiety this shift  Requested PRN ultram with morning medications for shoulder and back pain  Positive for groups  No other concerns or problems reported  Q 7 mins checks in place for safety  Will continue to monitor for behaviors and changes

## 2021-06-29 PROCEDURE — 99232 SBSQ HOSP IP/OBS MODERATE 35: CPT | Performed by: PSYCHIATRY & NEUROLOGY

## 2021-06-29 RX ADMIN — PHENYTOIN 2 MG: 125 SUSPENSION ORAL at 21:27

## 2021-06-29 RX ADMIN — ISOSORBIDE MONONITRATE 30 MG: 30 TABLET, EXTENDED RELEASE ORAL at 08:40

## 2021-06-29 RX ADMIN — GABAPENTIN 300 MG: 300 CAPSULE ORAL at 21:27

## 2021-06-29 RX ADMIN — METOPROLOL TARTRATE 25 MG: 25 TABLET, FILM COATED ORAL at 21:27

## 2021-06-29 RX ADMIN — PRAVASTATIN SODIUM 20 MG: 20 TABLET ORAL at 17:07

## 2021-06-29 RX ADMIN — GABAPENTIN 300 MG: 300 CAPSULE ORAL at 17:08

## 2021-06-29 RX ADMIN — METOPROLOL TARTRATE 25 MG: 25 TABLET, FILM COATED ORAL at 08:40

## 2021-06-29 RX ADMIN — ACETAMINOPHEN 650 MG: 325 TABLET ORAL at 08:37

## 2021-06-29 RX ADMIN — APIXABAN 5 MG: 5 TABLET, FILM COATED ORAL at 17:07

## 2021-06-29 RX ADMIN — APIXABAN 5 MG: 5 TABLET, FILM COATED ORAL at 08:40

## 2021-06-29 RX ADMIN — LIDOCAINE 3 PATCH: 50 PATCH TOPICAL at 08:01

## 2021-06-29 RX ADMIN — OLANZAPINE 7.5 MG: 5 TABLET, FILM COATED ORAL at 21:27

## 2021-06-29 RX ADMIN — OLANZAPINE 10 MG: 5 TABLET ORAL at 08:40

## 2021-06-29 RX ADMIN — AMLODIPINE BESYLATE 5 MG: 5 TABLET ORAL at 08:40

## 2021-06-29 RX ADMIN — MELATONIN 3 MG: at 21:28

## 2021-06-29 RX ADMIN — LORAZEPAM 0.5 MG: 0.5 TABLET ORAL at 07:59

## 2021-06-29 RX ADMIN — GABAPENTIN 300 MG: 300 CAPSULE ORAL at 08:39

## 2021-06-29 RX ADMIN — PANTOPRAZOLE SODIUM 40 MG: 40 TABLET, DELAYED RELEASE ORAL at 06:28

## 2021-06-29 RX ADMIN — VENLAFAXINE HYDROCHLORIDE 225 MG: 150 CAPSULE, EXTENDED RELEASE ORAL at 08:40

## 2021-06-29 NOTE — PLAN OF CARE
Problem: DISCHARGE PLANNING - CARE MANAGEMENT  Goal: Discharge to post-acute care or home with appropriate resources  Description: INTERVENTIONS:  - Conduct assessment to determine patient/family and health care team treatment goals, and need for post-acute services based on payer coverage, community resources, and patient preferences, and barriers to discharge  - Address psychosocial, clinical, and financial barriers to discharge as identified in assessment in conjunction with the patient/family and health care team  - Arrange appropriate level of post-acute services according to patients   needs and preference and payer coverage in collaboration with the physician and health care team  - Communicate with and update the patient/family, physician, and health care team regarding progress on the discharge plan  - Arrange appropriate transportation to post-acute venues  Outcome: Progressing     Pt progressing; DC upon [facility] placement

## 2021-06-29 NOTE — PROGRESS NOTES
Patient visible on the unit  She is pleasant and cooperative  Medication compliant  C/o increased anxiety this morning with other patients on the unit screaming  PRN ativan administered at 8 am for high anxiety  Patient stated later that ativan was effective  PRN tylenol also administered and was effective for pain #5 in bilateral hands  She stated she did not have any nightmares last night  She actually had a good dream of her daughter graduating  Denied depression,SI,HI, or hallucinations  Social with peers  Q 7 minute safety checks maintained

## 2021-06-29 NOTE — SOCIAL WORK
SW sent email to in-network CM to seek assistance in [facility] options that offer SSI beds     [facility], [address], [place], [place]  [address]  Cinthya Hinkle,  Ph: [phone] - [facility] Level of [address] [facility], [address]  Elvagarbiellafiordalizaalisha Deven,  Ph:  [phone] - [address], [address] [facility]  [address] [facility], [place], [address], [phone] - Independent Living    SW placed phone calls to [facility] (no available SSI beds) and [facility] (left message - awaiting response)    SW also researched PCHs in [facility]:   [place] on the [employer] [place] - [address] pay only  [employer] of [place] - [phone] - private pay [facility] - [phone] - left message for admissions - awaiting response   [facility], [facility] - [phone] - left message for Lora Brunner ([phone] cell)  [place]ina Martinez at [facility] - [phone] - private pay only   [facility] [place] - [phone] - private pay only   [place] - [phone] - private pay only   [facility] [place] [facility] [address]  - private pay only   [facility] [place] [facility] [place] - [phone] - private pay only   [facility] - [phone] - left message for admissions - awaiting response   [address] pay only

## 2021-06-29 NOTE — PROGRESS NOTES
06/29/21    Team Meeting   Meeting Type Daily Rounds   Team Members Present   Team Members Present Physician;Nurse;;Occupational Therapist   Physician Team Member Dr Anni Shah MD, [address]   Nursing Team Member Rachel Larsen, [facility]   Care Management Team Member MS Renee, [facility]   OT Team Member Susan Vidal, [place]   Patient/Family Present   Patient Present No   Patient's Family Present No   PRN this am  PT reports anxiety is high  PT denies nightmares  Denies depression, SI/HI/AH/VH, social  Looking for assisted living  Will D/C once placement identified

## 2021-06-29 NOTE — PROGRESS NOTES
Progress Note - Behavioral Health   Magalie Garcia 77 y o  female MRN: [id number]  Unit/Bed#: Louis Bright 201-01 Encounter: [id number]    Assessment/Plan   Principal Problem:    MDD (major depressive disorder), recurrent, severe, with psychosis ([facility] [address] )  Active Problems:    Essential hypertension    Chronic hand pain, right    Chronic low back pain    Osteoarthritis of lumbar spine with myelopathy    Vitamin D deficiency    CAD (coronary artery disease)    UTI (urinary tract infection)    Behavior over the last 24 hours:  unchanged  Sleep: slept better  Appetite: normal  Medication side effects: No  ROS: no complaints, all other systems are negative for acute changes    Mental Status Evaluation:  Appearance:  age appropriate and well dressed   Behavior:  cooperative, pleasant    Speech:  talkative, increased rate   Mood:  anxious mild   Affect:  blunted   Thought Process:  circumstantial   Thought Content:  ruminations   Perceptual Disturbances: None   Risk Potential: Suicidal Ideations none  Homicidal Ideations none  Potential for Aggression No   Sensorium:  person, place and time/date   Memory:  recent and remote memory grossly intact   Consciousness:  alert and awake    Attention: attention span appeared shorter than expected for age   Insight:  limited   Judgment: limited   Gait/Station: normal gait/station   Motor Activity: no abnormal movements     Progress Toward Goals: Patient reports ongoing episodes of high anxiety and restlessness, requiring p r n  medications  She has been participating and interacting with peers and staff appropriately  Patient has been compliant with medication and denies any current side effects    Recommended Treatment: Continue with group therapy, milieu therapy and occupational therapy  Risks, benefits and possible side effects of Medications:   Risks, benefits, and possible side effects of medications explained to patient and patient verbalizes understanding        Medications: all current active meds have been reviewed  Labs: I have personally reviewed all pertinent laboratory/tests results  Most Recent Labs:   Lab Results   Component Value Date    WBC 10 93 (H) 06/13/2021    RBC 3 91 06/13/2021    HGB 11 7 06/13/2021    HCT 38 1 06/13/2021     06/13/2021    RDW 13 5 06/13/2021    NEUTROABS 7 13 06/13/2021    SODIUM 147 (H) 06/13/2021    K 5 1 06/13/2021     (H) 06/13/2021    CO2 31 06/13/2021    BUN 24 06/13/2021    CREATININE 1 10 06/13/2021    GLUC 77 06/13/2021    CALCIUM 9 0 06/13/2021    AST 24 06/13/2021    ALT 36 06/13/2021    ALKPHOS 145 (H) 06/13/2021    TP 6 7 06/13/2021    ALB 3 2 (L) 06/13/2021    TBILI 0 20 06/13/2021    OBH9XBOWGRRT 1 838 06/13/2021       Counseling / Coordination of Care  Total floor / unit time spent today 20 minutes  Greater than 50% of total time was spent with the patient and / or family counseling and / or coordination of care

## 2021-06-29 NOTE — NURSING NOTE
Patient out for HS snack, social with peers  Compliant with HS medications  Ultram given with 1700 medications  No behaviors noted this part of the shift  Patient resting in her room  Q 7 mins checks in place for safety  Will continue to monitor for behaviors and changes

## 2021-06-29 NOTE — PROGRESS NOTES
Progress Note - Nikki Greer 77 y o  female MRN: [id number]    Unit/Bed#Delvin Merrill 201-01 Encounter: [id number]        Subjective:   Patient seen and examined at bedside after reviewing the chart and discussing the case with the caring staff  Patient examined at bedside  Patient reports no acute issues  Patient's x-ray of the foot which was done yesterday has not been read by radiology yet  Physical Exam   Vitals: Blood pressure 138/64, pulse 60, temperature (!) 97 4 °F (36 3 °C), temperature source Temporal, resp  rate 18, height 5' 3" (1 6 m), weight 99 1 kg (218 lb 7 6 oz), SpO2 96 %  ,Body mass index is 38 7 kg/m²  Constitutional: He appears well-developed  HEENT: PERR, EOMI, MMM  Cardiovascular: Normal rate and regular rhythm  Pulmonary/Chest: Effort normal and breath sounds normal    Abdomen: Soft, + BS, NT  Extremity:  Bilateral big toe nails thickened  Assessment/Plan:  Nikki Greer is a(n) 77y o  year old female with MDD with psychotic symptoms     1  Cardiac with history of hypertension, dyslipidemia, CHF, atrial fibrillation, coronary artery disease status post pacemaker placement  Patient will be continued on metoprolol 25 mg b i d , amlodipine 5 mg daily, Imdur 30 mg daily, Pravachol 20 mg daily and Eliquis 5 mg 2 times daily  2  Neuropathy  Patient is on gabapentin 200 mg 4 times daily  3  GERD  Patient is on Protonix 40 mg daily  4  DJD/osteoarthritis  I will also put the patient on Voltaren gel to be applied over the affected area  I will put the patient on Lidoderm patch for shoulder pain and lower back pain  Will start the patient on tramadol 50 mg every 6 hours as needed for severe pain  5  Gait abnormality  I will consult PT OT for further evaluation and treatment  6  Acute UTI  Patient's urine culture is negative for UTI I will discontinue the antibiotics  7  New vitamin-D deficiency    I will put the patient on vitamin-D bolus doses for 8 weeks followed by vitamin D3 1000 units daily  8  New vitamin B12 deficiency  I will put the patient on monthly B12 injections  9  Overgrown toenails and now acute right foot pain  Podiatry has been consulted  I will also order x-ray of the right foot for further evaluation and treatment

## 2021-06-30 PROCEDURE — 99232 SBSQ HOSP IP/OBS MODERATE 35: CPT | Performed by: NURSE PRACTITIONER

## 2021-06-30 RX ADMIN — OLANZAPINE 7.5 MG: 5 TABLET, FILM COATED ORAL at 21:24

## 2021-06-30 RX ADMIN — AMLODIPINE BESYLATE 5 MG: 5 TABLET ORAL at 08:38

## 2021-06-30 RX ADMIN — APIXABAN 5 MG: 5 TABLET, FILM COATED ORAL at 08:39

## 2021-06-30 RX ADMIN — PRAVASTATIN SODIUM 20 MG: 20 TABLET ORAL at 17:17

## 2021-06-30 RX ADMIN — GABAPENTIN 300 MG: 300 CAPSULE ORAL at 08:39

## 2021-06-30 RX ADMIN — TRAMADOL HYDROCHLORIDE 50 MG: 50 TABLET, FILM COATED ORAL at 21:24

## 2021-06-30 RX ADMIN — PANTOPRAZOLE SODIUM 40 MG: 40 TABLET, DELAYED RELEASE ORAL at 05:04

## 2021-06-30 RX ADMIN — LIDOCAINE 3 PATCH: 50 PATCH TOPICAL at 08:40

## 2021-06-30 RX ADMIN — ISOSORBIDE MONONITRATE 30 MG: 30 TABLET, EXTENDED RELEASE ORAL at 08:39

## 2021-06-30 RX ADMIN — METOPROLOL TARTRATE 25 MG: 25 TABLET, FILM COATED ORAL at 08:38

## 2021-06-30 RX ADMIN — GABAPENTIN 300 MG: 300 CAPSULE ORAL at 17:17

## 2021-06-30 RX ADMIN — METOPROLOL TARTRATE 25 MG: 25 TABLET, FILM COATED ORAL at 21:24

## 2021-06-30 RX ADMIN — OLANZAPINE 7.5 MG: 5 TABLET, FILM COATED ORAL at 08:37

## 2021-06-30 RX ADMIN — APIXABAN 5 MG: 5 TABLET, FILM COATED ORAL at 17:17

## 2021-06-30 RX ADMIN — VENLAFAXINE HYDROCHLORIDE 225 MG: 150 CAPSULE, EXTENDED RELEASE ORAL at 08:38

## 2021-06-30 RX ADMIN — MELATONIN 3 MG: at 21:24

## 2021-06-30 RX ADMIN — ACETAMINOPHEN 650 MG: 325 TABLET ORAL at 14:40

## 2021-06-30 RX ADMIN — PHENYTOIN 2 MG: 125 SUSPENSION ORAL at 21:23

## 2021-06-30 RX ADMIN — LORAZEPAM 0.5 MG: 0.5 TABLET ORAL at 14:42

## 2021-06-30 RX ADMIN — ERGOCALCIFEROL 50000 UNITS: 1.25 CAPSULE ORAL at 08:39

## 2021-06-30 RX ADMIN — GABAPENTIN 300 MG: 300 CAPSULE ORAL at 21:24

## 2021-06-30 NOTE — PROGRESS NOTES
Progress Note - Behavioral Health   Jose Vidal 77 y o  female MRN: [id number]  Unit/Bed#: Lindsey Rhoades 201-01 Encounter: [id number]    Assessment/Plan   Principal Problem:    MDD (major depressive disorder), recurrent, severe, with psychosis ([address] )  Active Problems:    Essential hypertension    Chronic hand pain, right    Chronic low back pain    Osteoarthritis of lumbar spine with myelopathy    Vitamin D deficiency    CAD (coronary artery disease)    UTI (urinary tract infection)      Behavior over the last 24 hours:  unchanged  Sleep: normal  Appetite: normal  Medication side effects: No  ROS: no complaints and All other systems negative for acute change     Sarahnicole Rushing was seen today for psychiatric follow-up  Patient reports mild anxiety and depression, however, she states it has significantly improved since admission  She no longer is reporting nightmares and is sleeping well throughout the night  Her thoughts are also less ruminating and less negative  Nathaneil Romance also no longer endorses AVH and denies SI/HI  She spoke about her readiness for discharge and moving onto the next step    Spoke with Ragini Rushing that referrals have been sent by case management for [facility] placement      Mental Status Evaluation:  Appearance:  age appropriate and casually dressed   Behavior:  Pleasant cooperative   Speech:  normal pitch and normal volume   Mood:  Mildly anxious   Affect:  mood-congruent and redirectable   Thought Process:  goal directed   Thought Content:  No overt delusions   Perceptual Disturbances: None   Risk Potential: Suicidal Ideations none  Homicidal Ideations none  Potential for Aggression No   Sensorium:  person, place, time/date and situation   Memory:  recent and remote memory grossly intact   Consciousness:  alert and awake    Attention: attention span and concentration were age appropriate   Insight:  limited   Judgment: limited   Gait/Station: normal gait/station   Motor Activity: no abnormal movements Progress Toward Goals:  Some improvement  Patient reports significant improvement with sleep and denies having any nightmares for the past 4 nights  Her anxiety is also somewhat improved and she is starting to voice readiness for discharge  Patient is aware that case management began sending referrals for [facility] placement- awaiting responses  No discharge date at this time  Will continue with current psychotropic regimen  Recommended Treatment: Continue with group therapy, milieu therapy and occupational therapy  Risks, benefits and possible side effects of Medications:   Risks, benefits, and possible side effects of medications explained to patient and patient verbalizes understanding        Medications:   all current active meds have been reviewed, continue current psychiatric medications and current meds:   Current Facility-Administered Medications   Medication Dose Route Frequency    acetaminophen (TYLENOL) tablet 650 mg  650 mg Oral Q4H PRN    acetaminophen (TYLENOL) tablet 650 mg  650 mg Oral Q4H PRN    aluminum-magnesium hydroxide-simethicone (MYLANTA) oral suspension 30 mL  30 mL Oral Q4H PRN    amLODIPine (NORVASC) tablet 5 mg  5 mg Oral Daily    apixaban (ELIQUIS) tablet 5 mg  5 mg Oral BID    [START ON 8/4/2021] cholecalciferol (VITAMIN D3) tablet 1,000 Units  1,000 Units Oral Daily    Diclofenac Sodium (VOLTAREN) 1 % topical gel 2 g  2 g Topical 4x Daily PRN    ergocalciferol (VITAMIN D2) capsule 50,000 Units  50,000 Units Oral Weekly    gabapentin (NEURONTIN) capsule 300 mg  300 mg Oral TID    hydrOXYzine HCL (ATARAX) tablet 25 mg  25 mg Oral Q6H PRN Max 4/day    isosorbide mononitrate (IMDUR) 24 hr tablet 30 mg  30 mg Oral Daily    lidocaine (LIDODERM) 5 % patch 3 patch  3 patch Topical Daily    LORazepam (ATIVAN) injection 1 mg  1 mg Intramuscular Q6H PRN Max 3/day    LORazepam (ATIVAN) tablet 0 5 mg  0 5 mg Oral Q6H PRN Max 4/day    LORazepam (ATIVAN) tablet 1 mg  1 mg Oral Q6H PRN Max 3/day    melatonin tablet 3 mg  3 mg Oral HS    metoprolol tartrate (LOPRESSOR) tablet 25 mg  25 mg Oral Q12H [facility]    OLANZapine (ZyPREXA) IM injection 5 mg  5 mg Intramuscular Q6H PRN Max 4/day    OLANZapine (ZyPREXA) tablet 2 5 mg  2 5 mg Oral Q6H PRN Max 4/day    OLANZapine (ZyPREXA) tablet 5 mg  5 mg Oral Q6H PRN Max 4/day    OLANZapine (ZyPREXA) tablet 7 5 mg  7 5 mg Oral Daily    OLANZapine (ZyPREXA) tablet 7 5 mg  7 5 mg Oral HS    ondansetron (ZOFRAN-ODT) dispersible tablet 4 mg  4 mg Oral Q6H PRN    pantoprazole (PROTONIX) EC tablet 40 mg  40 mg Oral Early Morning    polyethylene glycol (MIRALAX) packet 17 g  17 g Oral Daily PRN    pravastatin (PRAVACHOL) tablet 20 mg  20 mg Oral Daily With Dinner    prazosin (MINIPRESS) capsule 2 mg  2 mg Oral HS    risperiDONE (RisperDAL M-TAB) disintegrating tablet 0 5 mg  0 5 mg Oral Q6H PRN Max 4/day    senna-docusate sodium (SENOKOT S) 8 6-50 mg per tablet 1 tablet  1 tablet Oral Daily PRN    traMADol (ULTRAM) tablet 50 mg  50 mg Oral Q6H PRN    traZODone (DESYREL) tablet 50 mg  50 mg Oral HS PRN    venlafaxine (EFFEXOR-XR) 24 hr capsule 225 mg  225 mg Oral Daily     Labs: I have personally reviewed all pertinent laboratory/tests results  Counseling / Coordination of Care  Total floor / unit time spent today 25 minutes  Greater than 50% of total time was spent with the patient and / or family counseling and / or coordination of care

## 2021-06-30 NOTE — SOCIAL WORK
SW completed application for [facility] with patient and emailed back to [employer] ([email or web address]) - awaiting response

## 2021-06-30 NOTE — PROGRESS NOTES
Progress Note - Gattis Phalen 77 y o  female MRN: [id number]    Unit/Bed#Lily Haley 201-01 Encounter: [id number]        Subjective:   Patient seen and examined at bedside after reviewing the chart and discussing the case with the caring staff  Patient examined at bedside  Patient reports no acute issues  Patient's x-ray of the foot completed on 06/28/2021 showed no acute osseous abnormalities  Results were reviewed with patient  Physical Exam   Vitals: Blood pressure (!) 171/74, pulse 82, temperature (!) 97 1 °F (36 2 °C), temperature source Temporal, resp  rate 18, height 5' 3" (1 6 m), weight 101 kg (221 lb 12 5 oz), SpO2 95 %  ,Body mass index is 39 29 kg/m²  Constitutional: He appears well-developed  HEENT: PERR, EOMI, MMM  Cardiovascular: Normal rate and regular rhythm  Pulmonary/Chest: Effort normal and breath sounds normal    Abdomen: Soft, + BS, NT  Extremity:  Bilateral big toe nails thickened  Assessment/Plan:  Gattis Phalen is a(n) 77y o  year old female with MDD with psychotic symptoms     1  Cardiac with history of hypertension, dyslipidemia, CHF, atrial fibrillation, coronary artery disease status post pacemaker placement  Patient will be continued on metoprolol 25 mg b i d , amlodipine 5 mg daily, Imdur 30 mg daily, Pravachol 20 mg daily and Eliquis 5 mg 2 times daily  2  Neuropathy  Patient is on gabapentin 200 mg 4 times daily  3  GERD  Patient is on Protonix 40 mg daily  4  DJD/osteoarthritis  I will also put the patient on Voltaren gel to be applied over the affected area  I will put the patient on Lidoderm patch for shoulder pain and lower back pain  Will start the patient on tramadol 50 mg every 6 hours as needed for severe pain  5  Gait abnormality  I will consult PT OT for further evaluation and treatment  6  Acute UTI  Patient's urine culture is negative for UTI I will discontinue the antibiotics  7  New vitamin-D deficiency    I will put the patient on vitamin-D bolus doses for 8 weeks followed by vitamin D3 1000 units daily  8  New vitamin B12 deficiency  I will put the patient on monthly B12 injections  9  Overgrown toenails and now acute right foot pain  Podiatry has been consulted  Patient's x-ray of the right foot completed on 06/28/2021 showed no acute osseous abnormalities  The patient was discussed with Dr Julianne Cheung and he is in agreement with the above note

## 2021-06-30 NOTE — SOCIAL WORK
CARTER placed phone call to the following PCHs in [facility]:     [facility] - [phone] - left message on voicemail     [facility] - [phone] - spoke to Dr Lan Colin; female SSI bed available; Dr Lan Colin requested SW e-mail him at [email or web address] and he will forward application for pt to complete; Dr Lan Colin also requests SW review with patient that facility will take all of SSI payment and provide her with $85/mo allowance - CARTER agreed to review with patient; Dr Lan Colin states he will review application and supporting documents and if agreeable, a TEAMS video conference will need to be scheduled - CARTER agreeable; awaiting referral packet     CARTER met with patient to advise of potential bed in [place] - pt agreeable and understands allowance amount

## 2021-06-30 NOTE — PROGRESS NOTES
06/30/21   Team Meeting   Meeting Type Daily Rounds   Team Members Present   Team Members Present Physician;Nurse;; Occupational Therapist   Physician Team Member Dr Maura Pereira MD; JOSE Guerrero   Nursing Team Member Nohemi Garcia RN   Social Work Team Member Vinayak Anthony [place]   OT Team Member Sandy Forrest [place]   Patient/Family Present   Patient Present No   Patient's Family Present No     DC end of week if [facility] found; endorses dep/anx; intrusive, loud; helpful with other peers; slept

## 2021-06-30 NOTE — NURSING NOTE
Patient appears to have slept through the majority of the overnight hours  No acute behaviors noted  No complaints voiced  She is awake at this time however remains quietly in her room  Early AM medication administered as per order  Will CTM via q7 minute safety checks

## 2021-06-30 NOTE — NURSING NOTE
Patient was visible in the community this evening  Attended group and snack time  She is pleasant and cooperative during staff interactions  Social with peers  Endorses mild anxiety, denies depression  Denies SI, HI and hallucinations  Patient was medication compliant at HS  Lidoderm patches were removed and discarded as per protocol  Will CTM via q7 minute safety checks

## 2021-06-30 NOTE — SOCIAL WORK
SW received voicemail from [employer] pay only - $[phone] all inclusive     SW received voicemail from [facility] ([phone] - cell) requesting return phone call to discuss possible referral

## 2021-06-30 NOTE — PLAN OF CARE
Problem: Alteration in Thoughts and Perception  Goal: Treatment Goal: Gain control of psychotic behaviors/thinking, reduce/eliminate presenting symptoms and demonstrate improved reality functioning upon discharge  Outcome: Progressing  Goal: Refrain from acting on delusional thinking/internal stimuli  Description: Interventions:  - Monitor patient closely, per order   - Utilize least restrictive measures   - Set reasonable limits, give positive feedback for acceptable   - Administer medications as ordered and monitor of potential side effects  Outcome: Progressing  Goal: Agree to be compliant with medication regime, as prescribed and report medication side effects  Description: Interventions:  - Offer appropriate PRN medication and supervise ingestion; conduct AIMS, as needed   Outcome: Progressing     Problem: Ineffective Coping  Goal: Cooperates with admission process  Description: Interventions:   - Complete admission process  Outcome: Progressing  Goal: Identifies healthy coping skills  Outcome: Progressing  Goal: Participates in unit activities  Description: Interventions:  - Provide therapeutic environment   - Provide required programming   - Redirect inappropriate behaviors   Outcome: Progressing  Goal: Patient/Family participate in treatment and DC plans  Description: Interventions:  - Provide therapeutic environment  Outcome: Progressing     Problem: Depression  Goal: Treatment Goal: Demonstrate behavioral control of depressive symptoms, verbalize feelings of improved mood/affect, and adopt new coping skills prior to discharge  Outcome: Progressing  Goal: Refrain from harming self  Description: Interventions:  - Monitor patient closely, per order   - Supervise medication ingestion, monitor effects and side effects   Outcome: Progressing  Goal: Refrain from isolation  Description: Interventions:  - Develop a trusting relationship   - Encourage socialization   Outcome: Progressing  Goal: Refrain from self-neglect  Outcome: Progressing  Goal: Complete daily ADLs, including personal hygiene independently, as able  Description: Interventions:  - Observe, teach, and assist patient with ADLS  -  Monitor and promote a balance of rest/activity, with adequate nutrition and elimination   Outcome: Progressing     Problem: Anxiety  Goal: Anxiety is at manageable level  Description: Interventions:  - Assess and monitor patient's anxiety level  - Monitor for signs and symptoms (heart palpitations, chest pain, shortness of breath, headaches, nausea, feeling jumpy, restlessness, irritable, apprehensive)  - Collaborate with interdisciplinary team and initiate plan and interventions as ordered  - [place] patient to unit/surroundings  - Explain treatment plan  - Encourage participation in care  - Encourage verbalization of concerns/fears  - Identify coping mechanisms  - Assist in developing anxiety-reducing skills  - Administer/offer alternative therapies  - Limit or eliminate stimulants  Outcome: Progressing     Problem: Nutrition/Hydration-ADULT  Goal: Nutrient/Hydration intake appropriate for improving, restoring or maintaining nutritional needs  Description: Monitor and assess patient's nutrition/hydration status for malnutrition  Collaborate with interdisciplinary team and initiate plan and interventions as ordered  Monitor patient's weight and dietary intake as ordered or per policy  Utilize nutrition screening tool and intervene as necessary  Determine patient's food preferences and provide high-protein, high-caloric foods as appropriate       INTERVENTIONS:  - Monitor oral intake, urinary output, labs, and treatment plans  - Assess nutrition and hydration status and recommend course of action  - Evaluate amount of meals eaten  - Assist patient with eating if necessary   - Allow adequate time for meals  - Recommend/ encourage appropriate diets, oral nutritional supplements, and vitamin/mineral supplements  - Order, calculate, and assess calorie counts as needed  - Recommend, monitor, and adjust tube feedings and TPN/PPN based on assessed needs  - Assess need for intravenous fluids  - Provide specific nutrition/hydration education as appropriate  - Include patient/family/caregiver in decisions related to nutrition  Outcome: Progressing

## 2021-06-30 NOTE — NURSING NOTE
Patient visible in milieu, pleasant and cooperative in interaction  Social with staff and peers  Rates anxiety/depression at 5/10, denies SI/HI, hallucinations  Denied nightmares  Remains medication compliant and on 7" checks for safety and behaviors

## 2021-06-30 NOTE — SOCIAL WORK
SW met with patient in [facility]; pt denies SI/HI/AH/VH, dep "I'm always depressed", anx "mild"; pt and SW discussed possible DC by end of week if placement found - pt expressed anxiety re: DC planning stating "I thought it would take 2-3 months to find me a place?"; SW explained SW has started making phone calls to [facility] in [address] as requested but has, so far, been unsuccessful in finding one that accepts SSI payments; Sw explained SW may have to expand search to other counties - pt states "I don't want to be too far from my family"; SW explained pt cannot remain in hospital once deemed appropriate for discharge and if discharged to a [facility] that accepts SSI, pt and daughter can continue to look for closer alternatives - pt expressed understanding; pt then states "Listen, I don't want to go live in some crap hole   You had better find me a nice place"; SW explained SW has no way of knowing what the facilities look like or who the other residents may be - pt expressed understanding; SW inquired about pt positive UDS for Opiods - pt states she is prescribed Tylenol 3 by PCP for shoulder pain; no additional questions/concerns for SW at this time; SW will continue to meet with patient as needed for tx and dc planning

## 2021-06-30 NOTE — PLAN OF CARE
Problem: DISCHARGE PLANNING - CARE MANAGEMENT  Goal: Discharge to post-acute care or home with appropriate resources  Description: INTERVENTIONS:  - Conduct assessment to determine patient/family and health care team treatment goals, and need for post-acute services based on payer coverage, community resources, and patient preferences, and barriers to discharge  - Address psychosocial, clinical, and financial barriers to discharge as identified in assessment in conjunction with the patient/family and health care team  - Arrange appropriate level of post-acute services according to patients   needs and preference and payer coverage in collaboration with the physician and health care team  - Communicate with and update the patient/family, physician, and health care team regarding progress on the discharge plan  - Arrange appropriate transportation to post-acute venues  Outcome: Progressing     Pt progressing; DC to [facility] placement upon stabilization

## 2021-06-30 NOTE — SOCIAL WORK
SW placed phone call to [facility] [phone]; no female beds available at this time; ind living $700/mo; [facility] level $1200; private [facility] level $1500

## 2021-07-01 PROCEDURE — 99232 SBSQ HOSP IP/OBS MODERATE 35: CPT | Performed by: NURSE PRACTITIONER

## 2021-07-01 RX ORDER — HYDROXYZINE HYDROCHLORIDE 25 MG/1
50 TABLET, FILM COATED ORAL EVERY 6 HOURS PRN
Status: DISCONTINUED | OUTPATIENT
Start: 2021-07-01 | End: 2021-07-22 | Stop reason: HOSPADM

## 2021-07-01 RX ORDER — LORAZEPAM 0.5 MG/1
0.5 TABLET ORAL EVERY 8 HOURS PRN
Status: DISCONTINUED | OUTPATIENT
Start: 2021-07-01 | End: 2021-07-22 | Stop reason: HOSPADM

## 2021-07-01 RX ADMIN — MELATONIN 3 MG: at 21:18

## 2021-07-01 RX ADMIN — VENLAFAXINE HYDROCHLORIDE 225 MG: 150 CAPSULE, EXTENDED RELEASE ORAL at 08:31

## 2021-07-01 RX ADMIN — PRAVASTATIN SODIUM 20 MG: 20 TABLET ORAL at 16:38

## 2021-07-01 RX ADMIN — OLANZAPINE 7.5 MG: 5 TABLET, FILM COATED ORAL at 08:30

## 2021-07-01 RX ADMIN — ACETAMINOPHEN 650 MG: 325 TABLET ORAL at 05:17

## 2021-07-01 RX ADMIN — PANTOPRAZOLE SODIUM 40 MG: 40 TABLET, DELAYED RELEASE ORAL at 05:18

## 2021-07-01 RX ADMIN — APIXABAN 5 MG: 5 TABLET, FILM COATED ORAL at 08:31

## 2021-07-01 RX ADMIN — PHENYTOIN 2 MG: 125 SUSPENSION ORAL at 21:18

## 2021-07-01 RX ADMIN — ACETAMINOPHEN 650 MG: 325 TABLET ORAL at 10:40

## 2021-07-01 RX ADMIN — LIDOCAINE 3 PATCH: 50 PATCH TOPICAL at 08:33

## 2021-07-01 RX ADMIN — GABAPENTIN 300 MG: 300 CAPSULE ORAL at 21:19

## 2021-07-01 RX ADMIN — GABAPENTIN 300 MG: 300 CAPSULE ORAL at 16:38

## 2021-07-01 RX ADMIN — AMLODIPINE BESYLATE 5 MG: 5 TABLET ORAL at 08:30

## 2021-07-01 RX ADMIN — GABAPENTIN 300 MG: 300 CAPSULE ORAL at 08:30

## 2021-07-01 RX ADMIN — ISOSORBIDE MONONITRATE 30 MG: 30 TABLET, EXTENDED RELEASE ORAL at 08:30

## 2021-07-01 RX ADMIN — METOPROLOL TARTRATE 25 MG: 25 TABLET, FILM COATED ORAL at 08:31

## 2021-07-01 RX ADMIN — LORAZEPAM 0.5 MG: 0.5 TABLET ORAL at 08:34

## 2021-07-01 RX ADMIN — APIXABAN 5 MG: 5 TABLET, FILM COATED ORAL at 17:12

## 2021-07-01 RX ADMIN — OLANZAPINE 7.5 MG: 5 TABLET, FILM COATED ORAL at 21:18

## 2021-07-01 RX ADMIN — METOPROLOL TARTRATE 25 MG: 25 TABLET, FILM COATED ORAL at 21:18

## 2021-07-01 NOTE — NURSING NOTE
Patient was able to sleep through the majority of the overnight period, waking once to utilize the bathroom then returning to sleep  She is awake at this time and c/o b/l hand pain  Requested and received PRN Tylenol as per order for moderate pain  She informs she slept well and did not have any nightmares  Will CTM  Q7 minute safety checks in progress

## 2021-07-01 NOTE — PLAN OF CARE
Problem: Alteration in Thoughts and Perception  Goal: Treatment Goal: Gain control of psychotic behaviors/thinking, reduce/eliminate presenting symptoms and demonstrate improved reality functioning upon discharge  Outcome: Progressing  Goal: Refrain from acting on delusional thinking/internal stimuli  Description: Interventions:  - Monitor patient closely, per order   - Utilize least restrictive measures   - Set reasonable limits, give positive feedback for acceptable   - Administer medications as ordered and monitor of potential side effects  Outcome: Progressing  Goal: Agree to be compliant with medication regime, as prescribed and report medication side effects  Description: Interventions:  - Offer appropriate PRN medication and supervise ingestion; conduct AIMS, as needed   Outcome: Progressing     Problem: Ineffective Coping  Goal: Cooperates with admission process  Description: Interventions:   - Complete admission process  Outcome: Progressing  Goal: Identifies healthy coping skills  Outcome: Progressing  Goal: Participates in unit activities  Description: Interventions:  - Provide therapeutic environment   - Provide required programming   - Redirect inappropriate behaviors   Outcome: Progressing  Goal: Patient/Family participate in treatment and DC plans  Description: Interventions:  - Provide therapeutic environment  Outcome: Progressing     Problem: Depression  Goal: Treatment Goal: Demonstrate behavioral control of depressive symptoms, verbalize feelings of improved mood/affect, and adopt new coping skills prior to discharge  Outcome: Progressing  Goal: Refrain from harming self  Description: Interventions:  - Monitor patient closely, per order   - Supervise medication ingestion, monitor effects and side effects   Outcome: Progressing  Goal: Refrain from isolation  Description: Interventions:  - Develop a trusting relationship   - Encourage socialization   Outcome: Progressing  Goal: Refrain from self-neglect  Outcome: Progressing  Goal: Complete daily ADLs, including personal hygiene independently, as able  Description: Interventions:  - Observe, teach, and assist patient with ADLS  -  Monitor and promote a balance of rest/activity, with adequate nutrition and elimination   Outcome: Progressing     Problem: Anxiety  Goal: Anxiety is at manageable level  Description: Interventions:  - Assess and monitor patient's anxiety level  - Monitor for signs and symptoms (heart palpitations, chest pain, shortness of breath, headaches, nausea, feeling jumpy, restlessness, irritable, apprehensive)  - Collaborate with interdisciplinary team and initiate plan and interventions as ordered  - [place] patient to unit/surroundings  - Explain treatment plan  - Encourage participation in care  - Encourage verbalization of concerns/fears  - Identify coping mechanisms  - Assist in developing anxiety-reducing skills  - Administer/offer alternative therapies  - Limit or eliminate stimulants  Outcome: Progressing     Problem: Nutrition/Hydration-ADULT  Goal: Nutrient/Hydration intake appropriate for improving, restoring or maintaining nutritional needs  Description: Monitor and assess patient's nutrition/hydration status for malnutrition  Collaborate with interdisciplinary team and initiate plan and interventions as ordered  Monitor patient's weight and dietary intake as ordered or per policy  Utilize nutrition screening tool and intervene as necessary  Determine patient's food preferences and provide high-protein, high-caloric foods as appropriate       INTERVENTIONS:  - Monitor oral intake, urinary output, labs, and treatment plans  - Assess nutrition and hydration status and recommend course of action  - Evaluate amount of meals eaten  - Assist patient with eating if necessary   - Allow adequate time for meals  - Recommend/ encourage appropriate diets, oral nutritional supplements, and vitamin/mineral supplements  - Order, calculate, and assess calorie counts as needed  - Recommend, monitor, and adjust tube feedings and TPN/PPN based on assessed needs  - Assess need for intravenous fluids  - Provide specific nutrition/hydration education as appropriate  - Include patient/family/caregiver in decisions related to nutrition  Outcome: Progressing

## 2021-07-01 NOTE — PLAN OF CARE
Problem: DISCHARGE PLANNING - CARE MANAGEMENT  Goal: Discharge to post-acute care or home with appropriate resources  Description: INTERVENTIONS:  - Conduct assessment to determine patient/family and health care team treatment goals, and need for post-acute services based on payer coverage, community resources, and patient preferences, and barriers to discharge  - Address psychosocial, clinical, and financial barriers to discharge as identified in assessment in conjunction with the patient/family and health care team  - Arrange appropriate level of post-acute services according to patients   needs and preference and payer coverage in collaboration with the physician and health care team  - Communicate with and update the patient/family, physician, and health care team regarding progress on the discharge plan  - Arrange appropriate transportation to post-acute venues  Outcome: Progressing     Pt progressing; awaiting [facility] placement

## 2021-07-01 NOTE — SOCIAL WORK
SW placed phone call to [facility] to follow up on referral sent yesterday - received but needs additional documentation including facesheet, h&p, med list, progress notes and RN notes for review; SW obtained MAITE from patient and records sent as requested - awaiting response

## 2021-07-01 NOTE — NURSING NOTE
Patient visible in milieu, pleasant and cooperative in interaction  Social with staff and peers  Patient denies depression, SI/HI, hallucinations  Stated of having a nightmare overnight of her step-father killing people, support provided  PRN Ativan given per request for anxiety with positive effect  Remains medication compliant and on 7" checks for safety and behaviors

## 2021-07-01 NOTE — PROGRESS NOTES
Progress Note - Behavioral Health   Jay Herron 77 y o  female MRN: [id number]  Unit/Bed#: [address] 201-01 Encounter: [id number]    Assessment/Plan   Principal Problem:    MDD (major depressive disorder), recurrent, severe, with psychosis ([facility] [address] )  Active Problems:    Essential hypertension    Chronic hand pain, right    Chronic low back pain    Osteoarthritis of lumbar spine with myelopathy    Vitamin D deficiency    CAD (coronary artery disease)    UTI (urinary tract infection)      Behavior over the last 24 hours:  unchanged  Sleep: normal  Appetite: normal  Medication side effects: No  ROS: no complaints and all other systems negative for acute change     Juan Pablo Oconnor was seen today for psychiatric follow-up  She continues to endorse anxiety and rates as 5/10  Her sleep remains improved and she no longer endorses AVH or SI  Juan Pablo Oconnor is often visible in the milieu and social with peers  She reports that she has been obtaining PRN Ativan to assist with her anxiety; medication education was provided and patient appeared receptive  She is in agreement to begin p r n  Lorazepam taper in preparation for discharge and encouraged to utilize Atarax instead  She became mildly irritated after our conversation, but verbalized understanding  Her appetite remains adequate and she is compliant with psychotropic regimen      Mental Status Evaluation:  Appearance:  age appropriate and casually dressed   Behavior:  cooperative, calm   Speech:  normal pitch and normal volume   Mood:  anxious   Affect:  blunted   Thought Process:  goal directed   Thought Content:  No overt delusions   Perceptual Disturbances: None   Risk Potential: Suicidal Ideations none  Homicidal Ideations none  Potential for Aggression No   Sensorium:  person, place, time/date and situation   Memory:  recent and remote memory grossly intact   Consciousness:  alert and awake    Attention: attention span and concentration were age appropriate   Insight:  limited Judgment: limited   Gait/Station: normal gait/station   Motor Activity: no abnormal movements     Progress Toward Goals: Slight improvement  Patient continues to endorse 5/10 anxiety and requesting higher doses of lorazepam  Medication education provided and patient aware she will not be discharged with PRN lorazepam, only PRN hydroxyzine  Patient in agreement to initiate PRN lorazepam taper and encouraged to utilize hydroxyzine should she feel anxious  Will continue with current psychotropic regimen  Patient currently being reviewed at [facility] in [place]  No discharge date at this time  Recommended Treatment: Continue with group therapy, milieu therapy and occupational therapy  Risks, benefits and possible side effects of Medications:   Risks, benefits, and possible side effects of medications explained to patient and patient verbalizes understanding  Medications: all current active meds have been reviewed and planned medication changes: Add Hydroxyzine 50mg PO Q 6 hours PRN moderate anxiety  Change Lorazpeam 0 5mg PO [id number] PRN Severe Anxiety  Labs: I have personally reviewed all pertinent laboratory/tests results  Counseling / Coordination of Care  Total floor / unit time spent today 25 minutes  Greater than 50% of total time was spent with the patient and / or family counseling and / or coordination of care   A description of the counseling / coordination of care: medication education, treatment plan

## 2021-07-01 NOTE — NURSING NOTE
Patient was visible in the community this evening  Attended group and snack time without issue  She is social with peers  Endorse both anxiety and depression as "up there", denies SI, HI and hallucinations  Patient was medication compliant at HS; also requested and received PRN pain analgesia for c/o severe lower back pain  Given Tramadol for 7/10 back pain which was effective in providing relief  Lidoderm patches removed and discarded as per protocol  Will CTM via q7 minute safety checks

## 2021-07-02 PROCEDURE — 99232 SBSQ HOSP IP/OBS MODERATE 35: CPT | Performed by: NURSE PRACTITIONER

## 2021-07-02 RX ADMIN — GABAPENTIN 300 MG: 300 CAPSULE ORAL at 21:20

## 2021-07-02 RX ADMIN — PHENYTOIN 2 MG: 125 SUSPENSION ORAL at 21:20

## 2021-07-02 RX ADMIN — APIXABAN 5 MG: 5 TABLET, FILM COATED ORAL at 17:34

## 2021-07-02 RX ADMIN — ALUMINUM HYDROXIDE, MAGNESIUM HYDROXIDE, AND SIMETHICONE 30 ML: 200; 200; 20 SUSPENSION ORAL at 06:26

## 2021-07-02 RX ADMIN — MELATONIN 3 MG: at 21:20

## 2021-07-02 RX ADMIN — PRAVASTATIN SODIUM 20 MG: 20 TABLET ORAL at 17:00

## 2021-07-02 RX ADMIN — OLANZAPINE 7.5 MG: 5 TABLET, FILM COATED ORAL at 08:15

## 2021-07-02 RX ADMIN — PANTOPRAZOLE SODIUM 40 MG: 40 TABLET, DELAYED RELEASE ORAL at 06:00

## 2021-07-02 RX ADMIN — OLANZAPINE 7.5 MG: 5 TABLET, FILM COATED ORAL at 21:19

## 2021-07-02 RX ADMIN — VENLAFAXINE HYDROCHLORIDE 225 MG: 150 CAPSULE, EXTENDED RELEASE ORAL at 08:16

## 2021-07-02 RX ADMIN — ACETAMINOPHEN 650 MG: 325 TABLET ORAL at 01:10

## 2021-07-02 RX ADMIN — LIDOCAINE 3 PATCH: 50 PATCH TOPICAL at 08:15

## 2021-07-02 RX ADMIN — APIXABAN 5 MG: 5 TABLET, FILM COATED ORAL at 08:16

## 2021-07-02 RX ADMIN — METOPROLOL TARTRATE 25 MG: 25 TABLET, FILM COATED ORAL at 21:19

## 2021-07-02 RX ADMIN — LORAZEPAM 0.5 MG: 0.5 TABLET ORAL at 11:04

## 2021-07-02 RX ADMIN — GABAPENTIN 300 MG: 300 CAPSULE ORAL at 08:16

## 2021-07-02 RX ADMIN — TRAMADOL HYDROCHLORIDE 50 MG: 50 TABLET, FILM COATED ORAL at 14:09

## 2021-07-02 RX ADMIN — AMLODIPINE BESYLATE 5 MG: 5 TABLET ORAL at 08:16

## 2021-07-02 RX ADMIN — METOPROLOL TARTRATE 25 MG: 25 TABLET, FILM COATED ORAL at 08:16

## 2021-07-02 RX ADMIN — GABAPENTIN 300 MG: 300 CAPSULE ORAL at 17:00

## 2021-07-02 RX ADMIN — ISOSORBIDE MONONITRATE 30 MG: 30 TABLET, EXTENDED RELEASE ORAL at 08:16

## 2021-07-02 NOTE — NURSING NOTE
Patient appears to have slept through the overnight period, waking once to request PRN pain analgesia then returned to sleep  She is in bed sleeping at this time  No further complaints of pain voiced by patient  Will CTM via q7 minute safety checks

## 2021-07-02 NOTE — NURSING NOTE
Patient was observed to be visible in the community this evening  She is pleasant and cooperative during staff interactions  She denies pain at time of assessment  She endorses anxiety with a rating of 5/10 informing it is because "they found me a room to go to", denies depression, denies SI, HI and hallucinations  Patient was medication compliant at HS  Lidoderm patches removed as per order and discarded  Will CTM via q7 minute safety checks

## 2021-07-02 NOTE — PLAN OF CARE
Problem: DISCHARGE PLANNING - CARE MANAGEMENT  Goal: Discharge to post-acute care or home with appropriate resources  Description: INTERVENTIONS:  - Conduct assessment to determine patient/family and health care team treatment goals, and need for post-acute services based on payer coverage, community resources, and patient preferences, and barriers to discharge  - Address psychosocial, clinical, and financial barriers to discharge as identified in assessment in conjunction with the patient/family and health care team  - Arrange appropriate level of post-acute services according to patients   needs and preference and payer coverage in collaboration with the physician and health care team  - Communicate with and update the patient/family, physician, and health care team regarding progress on the discharge plan  - Arrange appropriate transportation to post-acute venues  Outcome: Progressing     No DC date - awaiting [facility] placement

## 2021-07-02 NOTE — NURSING NOTE
Patient compliant with medications and meals  Pleasant and cooperative  Patient can be intrusive at times with other patients and needs redirection  Patient stated she is anxious about discharge and going to a new place  Denies any depression  Denies any SI/HI  Reported a good night sleep, no nightmares last night  No other concerns or problems reported  Q 7 mins checks in place for safety  Will continue to monitor for behaviors and changes

## 2021-07-02 NOTE — PLAN OF CARE
Problem: Alteration in Thoughts and Perception  Goal: Treatment Goal: Gain control of psychotic behaviors/thinking, reduce/eliminate presenting symptoms and demonstrate improved reality functioning upon discharge  7/2/2021 1019 by Aarti Karimi RN  Outcome: Progressing  7/2/2021 1010 by Aarti Karimi RN  Outcome: Progressing  Goal: Refrain from acting on delusional thinking/internal stimuli  Description: Interventions:  - Monitor patient closely, per order   - Utilize least restrictive measures   - Set reasonable limits, give positive feedback for acceptable   - Administer medications as ordered and monitor of potential side effects  7/2/2021 1019 by Aarti Karimi RN  Outcome: Progressing  7/2/2021 1010 by Aarti Karimi RN  Outcome: Progressing  Goal: Agree to be compliant with medication regime, as prescribed and report medication side effects  Description: Interventions:  - Offer appropriate PRN medication and supervise ingestion; conduct AIMS, as needed   7/2/2021 1019 by Aarti Karimi RN  Outcome: Progressing  7/2/2021 1010 by Aarti Karimi RN  Outcome: Progressing     Problem: Ineffective Coping  Goal: Cooperates with admission process  Description: Interventions:   - Complete admission process  Outcome: Progressing     Problem: Depression  Goal: Treatment Goal: Demonstrate behavioral control of depressive symptoms, verbalize feelings of improved mood/affect, and adopt new coping skills prior to discharge  Outcome: Progressing  Goal: Refrain from harming self  Description: Interventions:  - Monitor patient closely, per order   - Supervise medication ingestion, monitor effects and side effects   Outcome: Progressing  Goal: Refrain from isolation  Description: Interventions:  - Develop a trusting relationship   - Encourage socialization   Outcome: Progressing  Goal: Refrain from self-neglect  Outcome: Progressing  Goal: Complete daily ADLs, including personal hygiene independently, as able  Description: Interventions:  - Observe, teach, and assist patient with ADLS  -  Monitor and promote a balance of rest/activity, with adequate nutrition and elimination   Outcome: Progressing

## 2021-07-02 NOTE — PROGRESS NOTES
Progress Note - Dana Pedro 77 y o  female MRN: [id number]    Unit/Bed#[facility] 201-01 Encounter: [id number]        Subjective:   Patient seen and examined at bedside after reviewing the chart and discussing the case with the caring staff  Patient examined at bedside  Patient has no reported acute issues  Physical Exam   Vitals: Blood pressure (!) 176/73, pulse 73, temperature (!) 97 1 °F (36 2 °C), temperature source Temporal, resp  rate 18, height 5' 3" (1 6 m), weight 101 kg (221 lb 12 5 oz), SpO2 95 %  ,Body mass index is 39 29 kg/m²  Constitutional: He appears well-developed  HEENT: PERR, EOMI, MMM  Cardiovascular: Normal rate and regular rhythm  Pulmonary/Chest: Effort normal and breath sounds normal    Abdomen: Soft, + BS, NT    Assessment/Plan:  Dana Pedro is a(n) 77y o  year old female with MDD with psychotic symptoms     1  Cardiac with history of hypertension, dyslipidemia, CHF, atrial fibrillation, coronary artery disease status post pacemaker placement  Patient will be continued on metoprolol 25 mg b i d , amlodipine 5 mg daily, Imdur 30 mg daily, Pravachol 20 mg daily and Eliquis 5 mg 2 times daily  2  Neuropathy  Patient is on gabapentin 200 mg 4 times daily  3  GERD  Patient is on Protonix 40 mg daily  4  DJD/osteoarthritis  I will also put the patient on Voltaren gel to be applied over the affected area  I will put the patient on Lidoderm patch for shoulder pain and lower back pain  Will start the patient on tramadol 50 mg every 6 hours as needed for severe pain  5  Gait abnormality  I will consult PT OT for further evaluation and treatment  6  Acute UTI  Patient's urine culture is negative for UTI I will discontinue the antibiotics  7  New vitamin-D deficiency  I will put the patient on vitamin-D bolus doses for 8 weeks followed by vitamin D3 1000 units daily  8  New vitamin B12 deficiency  I will put the patient on monthly B12 injections    9  Overgrown toenails and now acute right foot pain  Podiatry has been consulted  Patient's x-ray of the right foot completed on 06/28/2021 showed no acute osseous abnormalities

## 2021-07-02 NOTE — SOCIAL WORK
CARTER met with patient to call Social Security to request award letter per [facility] request - spoke to Basil Cerrato - will fax to [phone]; awaiting fax     CARTER emailed Gary/Daniel Ashford to schedule video call with patient for admission; awaiting response

## 2021-07-02 NOTE — NURSING NOTE
Patient informs she has heartburn and requests PRN Mylanta  Administered PRN as per order  Will monitor for medication effectiveness

## 2021-07-02 NOTE — NURSING NOTE
Patient is awake at this time  She requested and received PRN Tylenol 650mg for c/o left hand pain with a rating of 6/10  Will monitor for medication effectiveness

## 2021-07-02 NOTE — PROGRESS NOTES
07/02/21   Team Meeting   Meeting Type Daily Rounds   Team Members Present   Team Members Present Nurse;Physician;Occupational Therapist;   Physician Team Member Dr France Albarran MD; JOSE Howell   Nursing Team Member Aj Tang RN   [facility]   OT Team Member Susan Vidal [place]   Patient/Family Present   Patient Present No   Patient's Family Present No     No DC date - awaiting [facility] placement; Julieta Clark reviewing application; intrusive with other patients with care and recommendations

## 2021-07-02 NOTE — PROGRESS NOTES
Progress Note - Behavioral Health   Cheyanne Vega 77 y o  female MRN: [id number]  Unit/Bed#: [facility] 201-01 Encounter: [id number]    Assessment/Plan   Principal Problem:    MDD (major depressive disorder), recurrent, severe, with psychosis ([address] )  Active Problems:    Essential hypertension    Chronic hand pain, right    Chronic low back pain    Osteoarthritis of lumbar spine with myelopathy    Vitamin D deficiency    CAD (coronary artery disease)    UTI (urinary tract infection)      Behavior over the last 24 hours:  unchanged  Sleep: improved  Appetite: normal  Medication side effects: No  ROS: no complaints and All other systems negative for acute change     Eber Curiel was seen today for psychiatric follow-up  According to nursing staff, patient has been intrusive with peers  She often interjects herself a between staff and peers with care and recommendations  This behavior was also observed during encounter  Redirection was effective  Eber Curiel states she feels the need to [place] [facility] care of others  because of her motherly instinct    She continues to endorse mild anxiety related to discharge and placement to personal care home  She no longer endorses nightmares and reports her sleep is greatly improved  Denies AVH/SI/HI  She is often visible in the milieu and social with peers  Her appetite remains adequate and she is compliant with psychotropic regimen      Mental Status Evaluation:  Appearance:  age appropriate and casually dressed   Behavior:  Intrusive, cooperative, pleasant   Speech:  normal pitch and normal volume   Mood:  Mildly anxious   Affect:  mood-congruent and redirectable   Thought Process:  goal directed   Thought Content:  No overt delusions   Perceptual Disturbances: None   Risk Potential: Suicidal Ideations none  Homicidal Ideations none  Potential for Aggression No   Sensorium:  person, place, time/date and situation   Memory:  recent and remote memory grossly intact   Consciousness:  alert and awake    Attention: attention span and concentration were age appropriate   Insight:  limited   Judgment: limited   Gait/Station: normal gait/station   Motor Activity: no abnormal movements     Progress Toward Goals:  No change  Will continue with current psychotropic regimen; patient tolerating well  Leatha Lion 66 continues to review application for admission- awaiting response  No discharge date at this time  Recommended Treatment: Continue with group therapy, milieu therapy and occupational therapy  Risks, benefits and possible side effects of Medications:   Risks, benefits, and possible side effects of medications explained to patient and patient verbalizes understanding        Medications:   all current active meds have been reviewed, continue current psychiatric medications and current meds:   Current Facility-Administered Medications   Medication Dose Route Frequency    acetaminophen (TYLENOL) tablet 650 mg  650 mg Oral Q4H PRN    acetaminophen (TYLENOL) tablet 650 mg  650 mg Oral Q4H PRN    aluminum-magnesium hydroxide-simethicone (MYLANTA) oral suspension 30 mL  30 mL Oral Q4H PRN    amLODIPine (NORVASC) tablet 5 mg  5 mg Oral Daily    apixaban (ELIQUIS) tablet 5 mg  5 mg Oral BID    [START ON 8/4/2021] cholecalciferol (VITAMIN D3) tablet 1,000 Units  1,000 Units Oral Daily    Diclofenac Sodium (VOLTAREN) 1 % topical gel 2 g  2 g Topical 4x Daily PRN    ergocalciferol (VITAMIN D2) capsule 50,000 Units  50,000 Units Oral Weekly    gabapentin (NEURONTIN) capsule 300 mg  300 mg Oral TID    hydrOXYzine HCL (ATARAX) tablet 25 mg  25 mg Oral Q6H PRN Max 4/day    hydrOXYzine HCL (ATARAX) tablet 50 mg  50 mg Oral Q6H PRN    isosorbide mononitrate (IMDUR) 24 hr tablet 30 mg  30 mg Oral Daily    lidocaine (LIDODERM) 5 % patch 3 patch  3 patch Topical Daily    LORazepam (ATIVAN) tablet 0 5 mg  0 5 mg Oral Q8H PRN    melatonin tablet 3 mg  3 mg Oral HS    metoprolol tartrate (LOPRESSOR) tablet 25 mg  25 mg Oral Q12H [address]    OLANZapine (ZyPREXA) IM injection 5 mg  5 mg Intramuscular Q6H PRN Max 4/day    OLANZapine (ZyPREXA) tablet 2 5 mg  2 5 mg Oral Q6H PRN Max 4/day    OLANZapine (ZyPREXA) tablet 5 mg  5 mg Oral Q6H PRN Max 4/day    OLANZapine (ZyPREXA) tablet 7 5 mg  7 5 mg Oral Daily    OLANZapine (ZyPREXA) tablet 7 5 mg  7 5 mg Oral HS    ondansetron (ZOFRAN-ODT) dispersible tablet 4 mg  4 mg Oral Q6H PRN    pantoprazole (PROTONIX) EC tablet 40 mg  40 mg Oral Early Morning    polyethylene glycol (MIRALAX) packet 17 g  17 g Oral Daily PRN    pravastatin (PRAVACHOL) tablet 20 mg  20 mg Oral Daily With Dinner    prazosin (MINIPRESS) capsule 2 mg  2 mg Oral HS    risperiDONE (RisperDAL M-TAB) disintegrating tablet 0 5 mg  0 5 mg Oral Q6H PRN Max 4/day    senna-docusate sodium (SENOKOT S) 8 6-50 mg per tablet 1 tablet  1 tablet Oral Daily PRN    traMADol (ULTRAM) tablet 50 mg  50 mg Oral Q6H PRN    traZODone (DESYREL) tablet 50 mg  50 mg Oral HS PRN    venlafaxine (EFFEXOR-XR) 24 hr capsule 225 mg  225 mg Oral Daily     Labs: I have personally reviewed all pertinent laboratory/tests results  Counseling / Coordination of Care  Total floor / unit time spent today 25 minutes  Greater than 50% of total time was spent with the patient and / or family counseling and / or coordination of care

## 2021-07-03 PROCEDURE — 99232 SBSQ HOSP IP/OBS MODERATE 35: CPT | Performed by: PSYCHIATRY & NEUROLOGY

## 2021-07-03 RX ADMIN — GABAPENTIN 300 MG: 300 CAPSULE ORAL at 16:21

## 2021-07-03 RX ADMIN — LIDOCAINE 3 PATCH: 50 PATCH TOPICAL at 08:37

## 2021-07-03 RX ADMIN — TRAMADOL HYDROCHLORIDE 50 MG: 50 TABLET, FILM COATED ORAL at 04:34

## 2021-07-03 RX ADMIN — PHENYTOIN 2 MG: 125 SUSPENSION ORAL at 21:29

## 2021-07-03 RX ADMIN — TRAMADOL HYDROCHLORIDE 50 MG: 50 TABLET, FILM COATED ORAL at 11:53

## 2021-07-03 RX ADMIN — APIXABAN 5 MG: 5 TABLET, FILM COATED ORAL at 08:38

## 2021-07-03 RX ADMIN — GABAPENTIN 300 MG: 300 CAPSULE ORAL at 08:37

## 2021-07-03 RX ADMIN — METOPROLOL TARTRATE 25 MG: 25 TABLET, FILM COATED ORAL at 21:29

## 2021-07-03 RX ADMIN — OLANZAPINE 7.5 MG: 5 TABLET, FILM COATED ORAL at 08:37

## 2021-07-03 RX ADMIN — MELATONIN 3 MG: at 21:29

## 2021-07-03 RX ADMIN — HYDROXYZINE HYDROCHLORIDE 50 MG: 25 TABLET, FILM COATED ORAL at 15:46

## 2021-07-03 RX ADMIN — AMLODIPINE BESYLATE 5 MG: 5 TABLET ORAL at 08:38

## 2021-07-03 RX ADMIN — METOPROLOL TARTRATE 25 MG: 25 TABLET, FILM COATED ORAL at 08:38

## 2021-07-03 RX ADMIN — APIXABAN 5 MG: 5 TABLET, FILM COATED ORAL at 17:13

## 2021-07-03 RX ADMIN — GABAPENTIN 300 MG: 300 CAPSULE ORAL at 21:29

## 2021-07-03 RX ADMIN — PANTOPRAZOLE SODIUM 40 MG: 40 TABLET, DELAYED RELEASE ORAL at 04:34

## 2021-07-03 RX ADMIN — ISOSORBIDE MONONITRATE 30 MG: 30 TABLET, EXTENDED RELEASE ORAL at 08:38

## 2021-07-03 RX ADMIN — PRAVASTATIN SODIUM 20 MG: 20 TABLET ORAL at 16:21

## 2021-07-03 RX ADMIN — OLANZAPINE 7.5 MG: 5 TABLET, FILM COATED ORAL at 21:29

## 2021-07-03 RX ADMIN — VENLAFAXINE HYDROCHLORIDE 225 MG: 150 CAPSULE, EXTENDED RELEASE ORAL at 08:38

## 2021-07-03 NOTE — PROGRESS NOTES
Progress Note - Concetta Boyce 77 y o  female MRN: [id number]    [facility]Bed#[facility] Encounter: [id number]        Subjective:   Patient seen and examined at bedside after reviewing the chart and discussing the case with the caring staff  Patient examined at bedside  Patient reports that she has severe pain in her back  Patient has spinal cord stimulator which she uses through iPad but she does not have that iPad over here  Patient is wondering that if he can arrange some heating pad for her which she can put on her back  Patient is a possible discharge for Tuesday or Wednesday this week  Patient is going to a personal care home in [address] [place] [address]  Patient will be required requiring all her medications  I reviewed and reconciled patient's problem list and medications  Physical Exam   Vitals: Blood pressure 156/66, pulse 63, temperature (!) 96 6 °F (35 9 °C), temperature source Temporal, resp  rate 18, height 5' 3" (1 6 m), weight 101 kg (221 lb 12 5 oz), SpO2 97 %  ,Body mass index is 39 29 kg/m²  Constitutional: He appears well-developed  HEENT: PERR, EOMI, MMM  Cardiovascular: Normal rate and regular rhythm  Pulmonary/Chest: Effort normal and breath sounds normal    Abdomen: Soft, + BS, NT    Assessment/Plan:  Concetta Boyce is a(n) 77y o  year old female with MDD with psychotic symptoms    MEDICAL CLEARANCE  Patient is medically cleared for discharge  All scripts will be sent out for the patient once discharge is finalized to the relevant pharmacy      1  Cardiac with history of hypertension, dyslipidemia, CHF, atrial fibrillation, coronary artery disease status post pacemaker placement  Patient will be continued on metoprolol 25 mg b i d , amlodipine 5 mg daily, Imdur 30 mg daily, Pravachol 20 mg daily and Eliquis 5 mg 2 times daily  2  Neuropathy  Patient is on gabapentin 200 mg 4 times daily  3  GERD  Patient is on Protonix 40 mg daily  4  DJD/osteoarthritis   I will also put the patient on Voltaren gel to be applied over the affected area  I will put the patient on Lidoderm patch for shoulder pain and lower back pain  Will start the patient on tramadol 50 mg every 6 hours as needed for severe pain  Patient may use heating pad  5  Gait abnormality  I will consult PT OT for further evaluation and treatment  6  New vitamin-D deficiency  I will put the patient on vitamin-D bolus doses for 8 weeks followed by vitamin D3 1000 units daily  7  New vitamin B12 deficiency  I will put the patient on monthly B12 injections  8  Overgrown toenails and now acute right foot pain  Podiatry has been consulted  Patient's x-ray of the right foot completed on 06/28/2021 showed no acute osseous abnormalities

## 2021-07-03 NOTE — NURSING NOTE
Patient compliant with morning medications and meals  Patient can be intrusive with other patient's care and needs redirection  Reports moderate anxiety and depression due to discharge disposition  Patient requested ultram around 1200 for back pain  Social with peers  No other concerns or problems reported  Q 7 mins checks in place for safety  Will continue to monitor for behaviors and changes

## 2021-07-03 NOTE — PROGRESS NOTES
Progress Note - Behavioral Health   Anya Pod 77 y o  female MRN: [id number]  Unit/Bed#: [facility] Encounter: [id number]    Assessment/Plan   Principal Problem:    MDD (major depressive disorder), recurrent, severe, with psychosis ([address] )  Active Problems:    Essential hypertension    Chronic hand pain, right    Chronic low back pain    Osteoarthritis of lumbar spine with myelopathy    Vitamin D deficiency    CAD (coronary artery disease)    UTI (urinary tract infection)      Behavior over the last 24 hours:  unchanged  Sleep: improved  Appetite: normal  Medication side effects: No  ROS: nausea, otherwise, all other systems negative for acute change     Fercho Briseno was seen today for psychiatric follow-up  She reports significant improvement with her sleep and mild anxiety circumstantial to discharge planning  She rates her depression as 5/10 and no longer endorses suicidal ideations or AVH  Her mood remains stable and today she was less intrusive with other patients  Her appetite remains adequate and she is compliant with medication regimen  Awaiting response from [facility] on admission status  Mental Status Evaluation:  Appearance:  age appropriate, casually dressed and resting in bed   Behavior:  calm, cooperative, less intrusive   Speech:  normal pitch and normal volume   Mood:  mildy anxious   Affect:  constricted, redirectable   Thought Process:  goal directed   Thought Content:  No overt delusions   Perceptual Disturbances: None   Risk Potential: Suicidal Ideations none  Homicidal Ideations none  Potential for Aggression No   Sensorium:  person, place, time/date and situation   Memory:  recent and remote memory grossly intact   Consciousness:  alert and awake    Attention: attention span and concentration were age appropriate   Insight:  limited   Judgment: limited   Gait/Station: normal gait/station   Motor Activity: no abnormal movements     Progress Toward Goals:  No change    Patient no longer endorses extreme anxiety and depression, thoughts of SI, or AVH  Sleep has significantly improved with reduction in nightmares  Will continue with current psychotropic regimen; patient tolerating well  Patient currently being reviewed at [facility] admission response  Recommended Treatment: Continue with group therapy, milieu therapy and occupational therapy  Risks, benefits and possible side effects of Medications:   Risks, benefits, and possible side effects of medications explained to patient and patient verbalizes understanding        Medications:   all current active meds have been reviewed, continue current psychiatric medications and current meds:   Current Facility-Administered Medications   Medication Dose Route Frequency    acetaminophen (TYLENOL) tablet 650 mg  650 mg Oral Q4H PRN    acetaminophen (TYLENOL) tablet 650 mg  650 mg Oral Q4H PRN    aluminum-magnesium hydroxide-simethicone (MYLANTA) oral suspension 30 mL  30 mL Oral Q4H PRN    amLODIPine (NORVASC) tablet 5 mg  5 mg Oral Daily    apixaban (ELIQUIS) tablet 5 mg  5 mg Oral BID    [START ON 8/4/2021] cholecalciferol (VITAMIN D3) tablet 1,000 Units  1,000 Units Oral Daily    Diclofenac Sodium (VOLTAREN) 1 % topical gel 2 g  2 g Topical 4x Daily PRN    ergocalciferol (VITAMIN D2) capsule 50,000 Units  50,000 Units Oral Weekly    gabapentin (NEURONTIN) capsule 300 mg  300 mg Oral TID    hydrOXYzine HCL (ATARAX) tablet 25 mg  25 mg Oral Q6H PRN Max 4/day    hydrOXYzine HCL (ATARAX) tablet 50 mg  50 mg Oral Q6H PRN    isosorbide mononitrate (IMDUR) 24 hr tablet 30 mg  30 mg Oral Daily    lidocaine (LIDODERM) 5 % patch 3 patch  3 patch Topical Daily    LORazepam (ATIVAN) tablet 0 5 mg  0 5 mg Oral Q8H PRN    melatonin tablet 3 mg  3 mg Oral HS    metoprolol tartrate (LOPRESSOR) tablet 25 mg  25 mg Oral Q12H ALISA    OLANZapine (ZyPREXA) IM injection 5 mg  5 mg Intramuscular Q6H PRN Max 4/day    OLANZapine (ZyPREXA) tablet 2 5 mg  2 5 mg Oral Q6H PRN Max 4/day    OLANZapine (ZyPREXA) tablet 5 mg  5 mg Oral Q6H PRN Max 4/day    OLANZapine (ZyPREXA) tablet 7 5 mg  7 5 mg Oral Daily    OLANZapine (ZyPREXA) tablet 7 5 mg  7 5 mg Oral HS    ondansetron (ZOFRAN-ODT) dispersible tablet 4 mg  4 mg Oral Q6H PRN    pantoprazole (PROTONIX) EC tablet 40 mg  40 mg Oral Early Morning    polyethylene glycol (MIRALAX) packet 17 g  17 g Oral Daily PRN    pravastatin (PRAVACHOL) tablet 20 mg  20 mg Oral Daily With Dinner    prazosin (MINIPRESS) capsule 2 mg  2 mg Oral HS    risperiDONE (RisperDAL M-TAB) disintegrating tablet 0 5 mg  0 5 mg Oral Q6H PRN Max 4/day    senna-docusate sodium (SENOKOT S) 8 6-50 mg per tablet 1 tablet  1 tablet Oral Daily PRN    traMADol (ULTRAM) tablet 50 mg  50 mg Oral Q6H PRN    traZODone (DESYREL) tablet 50 mg  50 mg Oral HS PRN    venlafaxine (EFFEXOR-XR) 24 hr capsule 225 mg  225 mg Oral Daily     Labs: I have personally reviewed all pertinent laboratory/tests results  Counseling / Coordination of Care  Total floor / unit time spent today 25 minutes  Greater than 50% of total time was spent with the patient and / or family counseling and / or coordination of care

## 2021-07-03 NOTE — NURSING NOTE
[id number]     Pt found to be resting quietly on most rounds  Up around 0430 c/o severe generalized pain; prn analgesic administered Q 7 min checks ongoing

## 2021-07-03 NOTE — PLAN OF CARE
Problem: Alteration in Thoughts and Perception  Goal: Treatment Goal: Gain control of psychotic behaviors/thinking, reduce/eliminate presenting symptoms and demonstrate improved reality functioning upon discharge  Outcome: Progressing  Goal: Refrain from acting on delusional thinking/internal stimuli  Description: Interventions:  - Monitor patient closely, per order   - Utilize least restrictive measures   - Set reasonable limits, give positive feedback for acceptable   - Administer medications as ordered and monitor of potential side effects  Outcome: Progressing  Goal: Agree to be compliant with medication regime, as prescribed and report medication side effects  Description: Interventions:  - Offer appropriate PRN medication and supervise ingestion; conduct AIMS, as needed   Outcome: Progressing     Problem: Ineffective Coping  Goal: Identifies healthy coping skills  Outcome: Progressing  Goal: Participates in unit activities  Description: Interventions:  - Provide therapeutic environment   - Provide required programming   - Redirect inappropriate behaviors   Outcome: Progressing  Goal: Patient/Family participate in treatment and DC plans  Description: Interventions:  - Provide therapeutic environment  Outcome: Progressing

## 2021-07-03 NOTE — NURSING NOTE
Pt present in the dining room, hallway walking around and social with staff and peers  Pt was talking to another peer and stated she was very anxious because she believes the other pt was stating that she wanted to kill her  Pt was reinforced that the other pt was confused and had no intention to harm her  Pt agreed and stated that she understood the confusion and doesn't believe her life is in danger, but stated she is getting anxious from it all  Pt given much encouragement and pt agreed to try to use good coping skills to try to work through it without PRNs  Pt denied depression, denied ah, vh, si, hi  Pt was compliant with medications and meals  Pt went to bed, required no PRNs and was pleasant and cooperative with staff  Continuous visual safety checks performed throughout the shift  Safety precautions maintained  Will continue to monitor

## 2021-07-03 NOTE — NURSING NOTE
Patient came to nurses station and requested medication to go to sleep  Patient was informed medication to sleep are not given at this time of day  Patient then requested medications for increased anxiety and feeling "like jumping out of my skin" Atarax 50 mg given for jefferson score of 18  Will continue to monitor for behaviors and changes

## 2021-07-04 PROCEDURE — 99232 SBSQ HOSP IP/OBS MODERATE 35: CPT | Performed by: PSYCHIATRY & NEUROLOGY

## 2021-07-04 RX ADMIN — TRAMADOL HYDROCHLORIDE 50 MG: 50 TABLET, FILM COATED ORAL at 00:56

## 2021-07-04 RX ADMIN — PHENYTOIN 2 MG: 125 SUSPENSION ORAL at 20:52

## 2021-07-04 RX ADMIN — OLANZAPINE 7.5 MG: 5 TABLET, FILM COATED ORAL at 08:46

## 2021-07-04 RX ADMIN — PANTOPRAZOLE SODIUM 40 MG: 40 TABLET, DELAYED RELEASE ORAL at 06:35

## 2021-07-04 RX ADMIN — APIXABAN 5 MG: 5 TABLET, FILM COATED ORAL at 17:18

## 2021-07-04 RX ADMIN — AMLODIPINE BESYLATE 5 MG: 5 TABLET ORAL at 08:46

## 2021-07-04 RX ADMIN — GABAPENTIN 300 MG: 300 CAPSULE ORAL at 16:07

## 2021-07-04 RX ADMIN — APIXABAN 5 MG: 5 TABLET, FILM COATED ORAL at 08:47

## 2021-07-04 RX ADMIN — LORAZEPAM 0.5 MG: 0.5 TABLET ORAL at 16:08

## 2021-07-04 RX ADMIN — GABAPENTIN 300 MG: 300 CAPSULE ORAL at 20:52

## 2021-07-04 RX ADMIN — GABAPENTIN 300 MG: 300 CAPSULE ORAL at 08:46

## 2021-07-04 RX ADMIN — PRAVASTATIN SODIUM 20 MG: 20 TABLET ORAL at 16:08

## 2021-07-04 RX ADMIN — METOPROLOL TARTRATE 25 MG: 25 TABLET, FILM COATED ORAL at 08:47

## 2021-07-04 RX ADMIN — MELATONIN 3 MG: at 20:52

## 2021-07-04 RX ADMIN — ISOSORBIDE MONONITRATE 30 MG: 30 TABLET, EXTENDED RELEASE ORAL at 08:46

## 2021-07-04 RX ADMIN — LIDOCAINE 3 PATCH: 50 PATCH TOPICAL at 08:49

## 2021-07-04 RX ADMIN — TRAMADOL HYDROCHLORIDE 50 MG: 50 TABLET, FILM COATED ORAL at 08:48

## 2021-07-04 RX ADMIN — VENLAFAXINE HYDROCHLORIDE 225 MG: 150 CAPSULE, EXTENDED RELEASE ORAL at 08:47

## 2021-07-04 RX ADMIN — METOPROLOL TARTRATE 25 MG: 25 TABLET, FILM COATED ORAL at 20:53

## 2021-07-04 RX ADMIN — OLANZAPINE 7.5 MG: 5 TABLET, FILM COATED ORAL at 20:52

## 2021-07-04 RX ADMIN — HYDROXYZINE HYDROCHLORIDE 50 MG: 25 TABLET, FILM COATED ORAL at 20:56

## 2021-07-04 NOTE — NURSING NOTE
[id number]     Pt present in the milieu; affect bright on approach  Reports mood as depressed and anxious; can be intrusive; needs redirection  no acute behavioral issues noted  Q 7 min checks ongoing

## 2021-07-04 NOTE — PROGRESS NOTES
[phone]  Pt found to be resting quietly on most of authors rounds  No acute behavioral issues noted  Q 7 min checks maintained   Pt up at 0100  To request/recieve prn anxiolytic  Fall protocol in place

## 2021-07-04 NOTE — PLAN OF CARE
Problem: Alteration in Thoughts and Perception  Goal: Treatment Goal: Gain control of psychotic behaviors/thinking, reduce/eliminate presenting symptoms and demonstrate improved reality functioning upon discharge  Outcome: Progressing  Goal: Refrain from acting on delusional thinking/internal stimuli  Description: Interventions:  - Monitor patient closely, per order   - Utilize least restrictive measures   - Set reasonable limits, give positive feedback for acceptable   - Administer medications as ordered and monitor of potential side effects  Outcome: Progressing  Goal: Agree to be compliant with medication regime, as prescribed and report medication side effects  Description: Interventions:  - Offer appropriate PRN medication and supervise ingestion; conduct AIMS, as needed   Outcome: Progressing     Problem: Ineffective Coping  Goal: Cooperates with admission process  Description: Interventions:   - Complete admission process  Outcome: Progressing  Goal: Identifies healthy coping skills  Outcome: Progressing  Goal: Participates in unit activities  Description: Interventions:  - Provide therapeutic environment   - Provide required programming   - Redirect inappropriate behaviors   Outcome: Progressing  Goal: Patient/Family participate in treatment and DC plans  Description: Interventions:  - Provide therapeutic environment  Outcome: Progressing     Problem: Depression  Goal: Treatment Goal: Demonstrate behavioral control of depressive symptoms, verbalize feelings of improved mood/affect, and adopt new coping skills prior to discharge  Outcome: Progressing  Goal: Refrain from harming self  Description: Interventions:  - Monitor patient closely, per order   - Supervise medication ingestion, monitor effects and side effects   Outcome: Progressing  Goal: Refrain from isolation  Description: Interventions:  - Develop a trusting relationship   - Encourage socialization   Outcome: Progressing  Goal: Refrain from self-neglect  Outcome: Progressing  Goal: Complete daily ADLs, including personal hygiene independently, as able  Description: Interventions:  - Observe, teach, and assist patient with ADLS  -  Monitor and promote a balance of rest/activity, with adequate nutrition and elimination   Outcome: Progressing     Problem: Anxiety  Goal: Anxiety is at manageable level  Description: Interventions:  - Assess and monitor patient's anxiety level  - Monitor for signs and symptoms (heart palpitations, chest pain, shortness of breath, headaches, nausea, feeling jumpy, restlessness, irritable, apprehensive)  - Collaborate with interdisciplinary team and initiate plan and interventions as ordered  - [place] patient to unit/surroundings  - Explain treatment plan  - Encourage participation in care  - Encourage verbalization of concerns/fears  - Identify coping mechanisms  - Assist in developing anxiety-reducing skills  - Administer/offer alternative therapies  - Limit or eliminate stimulants  Outcome: Progressing     Problem: Nutrition/Hydration-ADULT  Goal: Nutrient/Hydration intake appropriate for improving, restoring or maintaining nutritional needs  Description: Monitor and assess patient's nutrition/hydration status for malnutrition  Collaborate with interdisciplinary team and initiate plan and interventions as ordered  Monitor patient's weight and dietary intake as ordered or per policy  Utilize nutrition screening tool and intervene as necessary  Determine patient's food preferences and provide high-protein, high-caloric foods as appropriate       INTERVENTIONS:  - Monitor oral intake, urinary output, labs, and treatment plans  - Assess nutrition and hydration status and recommend course of action  - Evaluate amount of meals eaten  - Assist patient with eating if necessary   - Allow adequate time for meals  - Recommend/ encourage appropriate diets, oral nutritional supplements, and vitamin/mineral supplements  - Order, calculate, and assess calorie counts as needed  - Recommend, monitor, and adjust tube feedings and TPN/PPN based on assessed needs  - Assess need for intravenous fluids  - Provide specific nutrition/hydration education as appropriate  - Include patient/family/caregiver in decisions related to nutrition  Outcome: Progressing

## 2021-07-04 NOTE — PROGRESS NOTES
Progress Note - Lora Montero 77 y o  female MRN: [id number]    Unit/Bed#[facility] 201-01 Encounter: [id number]        Subjective:   Patient seen and examined at bedside after reviewing the chart and discussing the case with the caring staff  Patient examined at bedside  Patient reports that she has severe pain in her back  Patient has spinal cord stimulator which she uses through iPad but she does not have that iPad over here  Patient is wondering that if he can arrange some heating pad for her which she can put on her back  Patient is a possible discharge for Tuesday or Wednesday this week  Patient is going to a personal care home in [address] [place] [address]  Patient will be required requiring all her medications  I reviewed and reconciled patient's problem list and medications  Physical Exam   Vitals: Blood pressure 149/88, pulse 65, temperature 97 8 °F (36 6 °C), temperature source Temporal, resp  rate 18, height 5' 3" (1 6 m), weight 101 kg (221 lb 12 5 oz), SpO2 93 %  ,Body mass index is 39 29 kg/m²  Constitutional: He appears well-developed  HEENT: PERR, EOMI, MMM  Cardiovascular: Normal rate and regular rhythm  Pulmonary/Chest: Effort normal and breath sounds normal    Abdomen: Soft, + BS, NT    Assessment/Plan:  Lora Montero is a(n) 77y o  year old female with MDD with psychotic symptoms    MEDICAL CLEARANCE  Patient is medically cleared for discharge  All scripts will be sent out for the patient once discharge is finalized to the relevant pharmacy      1  Cardiac with history of hypertension, dyslipidemia, CHF, atrial fibrillation, coronary artery disease status post pacemaker placement  Patient will be continued on metoprolol 25 mg b i d , amlodipine 5 mg daily, Imdur 30 mg daily, Pravachol 20 mg daily and Eliquis 5 mg 2 times daily  2  Neuropathy  Patient is on gabapentin 200 mg 4 times daily  3  GERD  Patient is on Protonix 40 mg daily  4  DJD/osteoarthritis   I will also put the patient on Voltaren gel to be applied over the affected area  I will put the patient on Lidoderm patch for shoulder pain and lower back pain  Will start the patient on tramadol 50 mg every 6 hours as needed for severe pain  Patient may use heating pad  5  Gait abnormality  I will consult PT OT for further evaluation and treatment  6  New vitamin-D deficiency  I will put the patient on vitamin-D bolus doses for 8 weeks followed by vitamin D3 1000 units daily  7  New vitamin B12 deficiency  I will put the patient on monthly B12 injections  8  Overgrown toenails and now acute right foot pain  Podiatry has been consulted  Patient's x-ray of the right foot completed on 06/28/2021 showed no acute osseous abnormalities

## 2021-07-04 NOTE — NURSING NOTE
Patient visible in milieu, pleasant and cooperative in interaction  Social with staff and peers  Denies anxiety and depression as of this time as well as SI/HI, hallucinations  Affect appropriate to circumstances  PRN Tramadol for back pain, helpful in reduction of pain  Remains medication compliant and on 7" checks for safety and behaviors

## 2021-07-04 NOTE — PROGRESS NOTES
Progress Note - Behavioral Health   [place] Shana 77 y o  female MRN: [id number]  Unit/Bed#: Liz Chaves 201-01 Encounter: [id number]    Assessment/Plan   Principal Problem:    MDD (major depressive disorder), recurrent, severe, with psychosis ([address] )  Active Problems:    Essential hypertension    Chronic hand pain, right    Chronic low back pain    Osteoarthritis of lumbar spine with myelopathy    Vitamin D deficiency    CAD (coronary artery disease)    UTI (urinary tract infection)      Behavior over the last 24 hours:  unchanged  Sleep: normal  Appetite: normal  Medication side effects: No  ROS: Back pain, otherwise all other systems negative for acute change     Bridget Gallegos was seen today for psychiatric follow-up  She continues to report improvement in her sleep and no longer endorses AVH/SI  Her mood was controlled  She reports some anxiety related to disposition planning  She denies any depressive symptoms and is often visible in the milieu and social with peers  Her appetite remains adequate and she is compliant with psychotropic regimen  She voiced no delusional content during encounter  Mental Status Evaluation:  Appearance:  age appropriate and casually dressed   Behavior:  Calm and cooperative   Speech:  normal pitch and normal volume   Mood:  anxious   Affect:  constricted   Thought Process:  goal directed   Thought Content:  No overt delusions   Perceptual Disturbances: None   Risk Potential: Suicidal Ideations none  Homicidal Ideations none  Potential for Aggression No   Sensorium:  person, place, time/date and situation   Memory:  recent and remote memory grossly intact   Consciousness:  alert and awake    Attention: attention span and concentration were age appropriate   Insight:  limited   Judgment: limited   Gait/Station: normal gait/station   Motor Activity: no abnormal movements     Progress Toward Goals:  No change  Will continue with current psychotropic regimen; patient tolerating well  Patient currently being reviewed at [facility] admission response  No discharge date at this time  Recommended Treatment: Continue with group therapy, milieu therapy and occupational therapy  Risks, benefits and possible side effects of Medications:   Risks, benefits, and possible side effects of medications explained to patient and patient verbalizes understanding        Medications:   all current active meds have been reviewed, continue current psychiatric medications and current meds:   Current Facility-Administered Medications   Medication Dose Route Frequency    acetaminophen (TYLENOL) tablet 650 mg  650 mg Oral Q4H PRN    acetaminophen (TYLENOL) tablet 650 mg  650 mg Oral Q4H PRN    aluminum-magnesium hydroxide-simethicone (MYLANTA) oral suspension 30 mL  30 mL Oral Q4H PRN    amLODIPine (NORVASC) tablet 5 mg  5 mg Oral Daily    apixaban (ELIQUIS) tablet 5 mg  5 mg Oral BID    [START ON 8/4/2021] cholecalciferol (VITAMIN D3) tablet 1,000 Units  1,000 Units Oral Daily    Diclofenac Sodium (VOLTAREN) 1 % topical gel 2 g  2 g Topical 4x Daily PRN    ergocalciferol (VITAMIN D2) capsule 50,000 Units  50,000 Units Oral Weekly    gabapentin (NEURONTIN) capsule 300 mg  300 mg Oral TID    hydrOXYzine HCL (ATARAX) tablet 25 mg  25 mg Oral Q6H PRN Max 4/day    hydrOXYzine HCL (ATARAX) tablet 50 mg  50 mg Oral Q6H PRN    isosorbide mononitrate (IMDUR) 24 hr tablet 30 mg  30 mg Oral Daily    lidocaine (LIDODERM) 5 % patch 3 patch  3 patch Topical Daily    LORazepam (ATIVAN) tablet 0 5 mg  0 5 mg Oral Q8H PRN    melatonin tablet 3 mg  3 mg Oral HS    metoprolol tartrate (LOPRESSOR) tablet 25 mg  25 mg Oral Q12H ALISA    OLANZapine (ZyPREXA) IM injection 5 mg  5 mg Intramuscular Q6H PRN Max 4/day    OLANZapine (ZyPREXA) tablet 2 5 mg  2 5 mg Oral Q6H PRN Max 4/day    OLANZapine (ZyPREXA) tablet 5 mg  5 mg Oral Q6H PRN Max 4/day    OLANZapine (ZyPREXA) tablet 7 5 mg  7 5 mg Oral Daily  OLANZapine (ZyPREXA) tablet 7 5 mg  7 5 mg Oral HS    ondansetron (ZOFRAN-ODT) dispersible tablet 4 mg  4 mg Oral Q6H PRN    pantoprazole (PROTONIX) EC tablet 40 mg  40 mg Oral Early Morning    polyethylene glycol (MIRALAX) packet 17 g  17 g Oral Daily PRN    pravastatin (PRAVACHOL) tablet 20 mg  20 mg Oral Daily With Dinner    prazosin (MINIPRESS) capsule 2 mg  2 mg Oral HS    risperiDONE (RisperDAL M-TAB) disintegrating tablet 0 5 mg  0 5 mg Oral Q6H PRN Max 4/day    senna-docusate sodium (SENOKOT S) 8 6-50 mg per tablet 1 tablet  1 tablet Oral Daily PRN    traMADol (ULTRAM) tablet 50 mg  50 mg Oral Q6H PRN    traZODone (DESYREL) tablet 50 mg  50 mg Oral HS PRN    venlafaxine (EFFEXOR-XR) 24 hr capsule 225 mg  225 mg Oral Daily     Labs: I have personally reviewed all pertinent laboratory/tests results  Counseling / Coordination of Care  Total floor / unit time spent today 25 minutes  Greater than 50% of total time was spent with the patient and / or family counseling and / or coordination of care

## 2021-07-05 PROBLEM — N39.0 UTI (URINARY TRACT INFECTION): Status: RESOLVED | Noted: 2021-06-16 | Resolved: 2021-07-05

## 2021-07-05 PROCEDURE — 99232 SBSQ HOSP IP/OBS MODERATE 35: CPT | Performed by: PSYCHIATRY & NEUROLOGY

## 2021-07-05 RX ORDER — OLANZAPINE 10 MG/1
10 TABLET ORAL
Status: DISCONTINUED | OUTPATIENT
Start: 2021-07-05 | End: 2021-07-22 | Stop reason: HOSPADM

## 2021-07-05 RX ORDER — OLANZAPINE 5 MG/1
5 TABLET ORAL DAILY
Status: DISCONTINUED | OUTPATIENT
Start: 2021-07-06 | End: 2021-07-22 | Stop reason: HOSPADM

## 2021-07-05 RX ORDER — OXYCODONE HYDROCHLORIDE 10 MG/1
10 TABLET ORAL EVERY 4 HOURS PRN
Status: DISCONTINUED | OUTPATIENT
Start: 2021-07-05 | End: 2021-07-06

## 2021-07-05 RX ADMIN — METOPROLOL TARTRATE 25 MG: 25 TABLET, FILM COATED ORAL at 08:34

## 2021-07-05 RX ADMIN — APIXABAN 5 MG: 5 TABLET, FILM COATED ORAL at 17:08

## 2021-07-05 RX ADMIN — GABAPENTIN 300 MG: 300 CAPSULE ORAL at 15:48

## 2021-07-05 RX ADMIN — OXYCODONE HYDROCHLORIDE 10 MG: 10 TABLET ORAL at 17:27

## 2021-07-05 RX ADMIN — OLANZAPINE 7.5 MG: 5 TABLET, FILM COATED ORAL at 08:33

## 2021-07-05 RX ADMIN — PHENYTOIN 2 MG: 125 SUSPENSION ORAL at 21:29

## 2021-07-05 RX ADMIN — ISOSORBIDE MONONITRATE 30 MG: 30 TABLET, EXTENDED RELEASE ORAL at 08:33

## 2021-07-05 RX ADMIN — GABAPENTIN 300 MG: 300 CAPSULE ORAL at 21:29

## 2021-07-05 RX ADMIN — LIDOCAINE 3 PATCH: 50 PATCH TOPICAL at 08:36

## 2021-07-05 RX ADMIN — VENLAFAXINE HYDROCHLORIDE 225 MG: 150 CAPSULE, EXTENDED RELEASE ORAL at 08:34

## 2021-07-05 RX ADMIN — GABAPENTIN 300 MG: 300 CAPSULE ORAL at 08:33

## 2021-07-05 RX ADMIN — METOPROLOL TARTRATE 25 MG: 25 TABLET, FILM COATED ORAL at 21:29

## 2021-07-05 RX ADMIN — LORAZEPAM 0.5 MG: 0.5 TABLET ORAL at 09:30

## 2021-07-05 RX ADMIN — MELATONIN 3 MG: at 21:29

## 2021-07-05 RX ADMIN — HYDROXYZINE HYDROCHLORIDE 50 MG: 25 TABLET, FILM COATED ORAL at 19:49

## 2021-07-05 RX ADMIN — APIXABAN 5 MG: 5 TABLET, FILM COATED ORAL at 08:34

## 2021-07-05 RX ADMIN — PANTOPRAZOLE SODIUM 40 MG: 40 TABLET, DELAYED RELEASE ORAL at 06:32

## 2021-07-05 RX ADMIN — AMLODIPINE BESYLATE 5 MG: 5 TABLET ORAL at 08:33

## 2021-07-05 RX ADMIN — PRAVASTATIN SODIUM 20 MG: 20 TABLET ORAL at 15:47

## 2021-07-05 RX ADMIN — OLANZAPINE 10 MG: 10 TABLET, FILM COATED ORAL at 21:29

## 2021-07-05 RX ADMIN — OXYCODONE HYDROCHLORIDE 10 MG: 10 TABLET ORAL at 11:14

## 2021-07-05 NOTE — NURSING NOTE
[id number]     Pt present in the milieu; Mood labile  Pt argumentative with staff  Demanding scissors to cut her hair  Shazia informed of contraband policy; responded with increase agitation  Firm limit set  Discussion ended  Requested/recieved prn anxiolytic  Q 7 min checks ongoing

## 2021-07-05 NOTE — NURSING NOTE
Patient visible in milieu, mood and affect labile, speech loud  Patient endorses "high" anxiety/depression due to upcoming discharge and pain  Patient also expressed being upset over being told by grandson that her daughter is using heroin again after being 20 years clean, support provided  PRN Ativan given per request  Denies SI/HI, hallucinations, or nightmares  Remains medication compliant and on 7" checks for safety and behaviors

## 2021-07-05 NOTE — NURSING NOTE
Patient c/o increased anxiety and is tearful over a conversation she had over the phone with her brother in law pertaining to her  sister  She requested and received PRN Atarax as per order for a Chappell Scale rating of 20  Will monitor for medication effectiveness

## 2021-07-05 NOTE — PROGRESS NOTES
Progress Note - Magaly Abernathy 77 y o  female MRN: [id number]    Unit/Bed#Karina Sterling 201-01 Encounter: [id number]        Subjective:   Patient seen and examined at bedside after reviewing the chart and discussing the case with the caring staff  Patient examined at bedside  Patient continues to report back pain which seems poorly controlled on Ultram     Patient is a possible discharge for Tuesday or Wednesday this week  Patient is going to a personal care home in [address] [place] [address]  Patient will be required requiring all her medications  I reviewed and reconciled patient's problem list and medications  Physical Exam   Vitals: Blood pressure 163/69, pulse 63, temperature 98 2 °F (36 8 °C), temperature source Temporal, resp  rate 18, height 5' 3" (1 6 m), weight 101 kg (221 lb 12 5 oz), SpO2 97 %  ,Body mass index is 39 29 kg/m²  Constitutional: He appears well-developed  HEENT: PERR, EOMI, MMM  Cardiovascular: Normal rate and regular rhythm  Pulmonary/Chest: Effort normal and breath sounds normal    Abdomen: Soft, + BS, NT    Assessment/Plan:  Magaly Abernathy is a(n) 77y o  year old female with MDD with psychotic symptoms    MEDICAL CLEARANCE  Patient is medically cleared for discharge  All scripts will be sent out for the patient once discharge is finalized to the relevant pharmacy      1  Cardiac with history of hypertension, dyslipidemia, CHF, atrial fibrillation, coronary artery disease status post pacemaker placement  Patient will be continued on metoprolol 25 mg b i d , amlodipine 5 mg daily, Imdur 30 mg daily, Pravachol 20 mg daily and Eliquis 5 mg 2 times daily  2  Neuropathy  Patient is on gabapentin 200 mg 4 times daily  3  GERD  Patient is on Protonix 40 mg daily  4  DJD/osteoarthritis  I will put the patient on oxycodone 10 mg every 4 hours on as-needed basis for severe pain along with Lidoderm patch for shoulder pain and lower back pain  Patient may use heating pad    I will discontinue tramadol  5  Gait abnormality  I will consult PT OT for further evaluation and treatment  6  New vitamin-D deficiency  I will put the patient on vitamin-D bolus doses for 8 weeks followed by vitamin D3 1000 units daily  7  New vitamin B12 deficiency  I will put the patient on monthly B12 injections  8  Overgrown toenails and now acute right foot pain  Podiatry has been consulted  Patient's x-ray of the right foot completed on 06/28/2021 showed no acute osseous abnormalities

## 2021-07-05 NOTE — PROGRESS NOTES
Progress Note - Behavioral Health   Abigail Simmons 77 y o  female MRN: [id number]  Unit/Bed#: Ashwin Farrar 201-01 Encounter: [id number]    Assessment/Plan   Principal Problem:    MDD (major depressive disorder), recurrent, severe, with psychosis ([address] )  Active Problems:    Essential hypertension    Chronic hand pain, right    Chronic low back pain    Osteoarthritis of lumbar spine with myelopathy    Vitamin D deficiency    CAD (coronary artery disease)    UTI (urinary tract infection)      Behavior over the last 24 hours:  unchanged  Sleep: decreased  Appetite: increased  Medication side effects: No  ROS: chronic pain, all other systems are negative for acute changes     Mental Status Evaluation:  Appearance:  age appropriate   Behavior:  cooperative   Speech:  normal volume   Mood:  anxious mild   Affect:  mood-congruent   Thought Process:  goal directed   Thought Content:  no overt delusions   Perceptual Disturbances: None   Risk Potential: Suicidal Ideations none  Homicidal Ideations none  Potential for Aggression No   Sensorium:  person, place and time/date   Memory:  recent and remote memory grossly intact   Consciousness:  alert and awake    Attention: attention span and concentration were age appropriate   Insight:  limited   Judgment: limited   Gait/Station: normal gait/station   Motor Activity: no abnormal movements     Progress Toward Goals: Patient has not shown any acute behavior changes except disruptive sleep, requiring p r n  Ativan last night  She has been compliant with medication and denies any current side effects  Recommended Treatment: Continue with group therapy, milieu therapy and occupational therapy  [facility] placement is in progress  Risks, benefits and possible side effects of Medications:   Risks, benefits, and possible side effects of medications explained to patient and patient verbalizes understanding  Medications: all current active meds have been reviewed  Labs:  I have personally reviewed all pertinent laboratory/tests results  Most Recent Labs:   Lab Results   Component Value Date    WBC 10 93 (H) 06/13/2021    RBC 3 91 06/13/2021    HGB 11 7 06/13/2021    HCT 38 1 06/13/2021     06/13/2021    RDW 13 5 06/13/2021    NEUTROABS 7 13 06/13/2021    SODIUM 147 (H) 06/13/2021    K 5 1 06/13/2021     (H) 06/13/2021    CO2 31 06/13/2021    BUN 24 06/13/2021    CREATININE 1 10 06/13/2021    GLUC 77 06/13/2021    CALCIUM 9 0 06/13/2021    AST 24 06/13/2021    ALT 36 06/13/2021    ALKPHOS 145 (H) 06/13/2021    TP 6 7 06/13/2021    ALB 3 2 (L) 06/13/2021    TBILI 0 20 06/13/2021    [id number] 1 838 06/13/2021       Counseling / Coordination of Care  Total floor / unit time spent today 20 minutes  Greater than 50% of total time was spent with the patient and / or family counseling and / or coordination of care

## 2021-07-05 NOTE — PLAN OF CARE
Problem: Alteration in Thoughts and Perception  Goal: Treatment Goal: Gain control of psychotic behaviors/thinking, reduce/eliminate presenting symptoms and demonstrate improved reality functioning upon discharge  Outcome: Progressing  Goal: Refrain from acting on delusional thinking/internal stimuli  Description: Interventions:  - Monitor patient closely, per order   - Utilize least restrictive measures   - Set reasonable limits, give positive feedback for acceptable   - Administer medications as ordered and monitor of potential side effects  Outcome: Progressing  Goal: Agree to be compliant with medication regime, as prescribed and report medication side effects  Description: Interventions:  - Offer appropriate PRN medication and supervise ingestion; conduct AIMS, as needed   Outcome: Progressing     Problem: Ineffective Coping  Goal: Cooperates with admission process  Description: Interventions:   - Complete admission process  Outcome: Progressing  Goal: Identifies healthy coping skills  Outcome: Progressing  Goal: Participates in unit activities  Description: Interventions:  - Provide therapeutic environment   - Provide required programming   - Redirect inappropriate behaviors   Outcome: Progressing  Goal: Patient/Family participate in treatment and DC plans  Description: Interventions:  - Provide therapeutic environment  Outcome: Progressing     Problem: Depression  Goal: Treatment Goal: Demonstrate behavioral control of depressive symptoms, verbalize feelings of improved mood/affect, and adopt new coping skills prior to discharge  Outcome: Progressing  Goal: Refrain from harming self  Description: Interventions:  - Monitor patient closely, per order   - Supervise medication ingestion, monitor effects and side effects   Outcome: Progressing  Goal: Refrain from isolation  Description: Interventions:  - Develop a trusting relationship   - Encourage socialization   Outcome: Progressing  Goal: Refrain from self-neglect  Outcome: Progressing  Goal: Complete daily ADLs, including personal hygiene independently, as able  Description: Interventions:  - Observe, teach, and assist patient with ADLS  -  Monitor and promote a balance of rest/activity, with adequate nutrition and elimination   Outcome: Progressing     Problem: Anxiety  Goal: Anxiety is at manageable level  Description: Interventions:  - Assess and monitor patient's anxiety level  - Monitor for signs and symptoms (heart palpitations, chest pain, shortness of breath, headaches, nausea, feeling jumpy, restlessness, irritable, apprehensive)  - Collaborate with interdisciplinary team and initiate plan and interventions as ordered  - [place] patient to unit/surroundings  - Explain treatment plan  - Encourage participation in care  - Encourage verbalization of concerns/fears  - Identify coping mechanisms  - Assist in developing anxiety-reducing skills  - Administer/offer alternative therapies  - Limit or eliminate stimulants  Outcome: Progressing     Problem: Nutrition/Hydration-ADULT  Goal: Nutrient/Hydration intake appropriate for improving, restoring or maintaining nutritional needs  Description: Monitor and assess patient's nutrition/hydration status for malnutrition  Collaborate with interdisciplinary team and initiate plan and interventions as ordered  Monitor patient's weight and dietary intake as ordered or per policy  Utilize nutrition screening tool and intervene as necessary  Determine patient's food preferences and provide high-protein, high-caloric foods as appropriate       INTERVENTIONS:  - Monitor oral intake, urinary output, labs, and treatment plans  - Assess nutrition and hydration status and recommend course of action  - Evaluate amount of meals eaten  - Assist patient with eating if necessary   - Allow adequate time for meals  - Recommend/ encourage appropriate diets, oral nutritional supplements, and vitamin/mineral supplements  - Order, calculate, and assess calorie counts as needed  - Recommend, monitor, and adjust tube feedings and TPN/PPN based on assessed needs  - Assess need for intravenous fluids  - Provide specific nutrition/hydration education as appropriate  - Include patient/family/caregiver in decisions related to nutrition  Outcome: Progressing

## 2021-07-06 LAB
ALBUMIN SERPL BCP-MCNC: 3.5 G/DL (ref 3.5–5.7)
ALP SERPL-CCNC: 138 U/L (ref 55–165)
ALT SERPL W P-5'-P-CCNC: 19 U/L (ref 7–52)
ANION GAP SERPL CALCULATED.3IONS-SCNC: 5 MMOL/L (ref 4–13)
AST SERPL W P-5'-P-CCNC: 17 U/L (ref 13–39)
BASOPHILS # BLD AUTO: 0 THOUSANDS/ΜL (ref 0–0.1)
BASOPHILS NFR BLD AUTO: 1 % (ref 0–2)
BILIRUB SERPL-MCNC: 0.4 MG/DL (ref 0.2–1)
BUN SERPL-MCNC: 17 MG/DL (ref 7–25)
CALCIUM SERPL-MCNC: 8.7 MG/DL (ref 8.6–10.5)
CHLORIDE SERPL-SCNC: 106 MMOL/L (ref 98–107)
CO2 SERPL-SCNC: 28 MMOL/L (ref 21–31)
CREAT SERPL-MCNC: 0.81 MG/DL (ref 0.6–1.2)
EOSINOPHIL # BLD AUTO: 0.3 THOUSAND/ΜL (ref 0–0.61)
EOSINOPHIL NFR BLD AUTO: 5 % (ref 0–5)
ERYTHROCYTE [DISTWIDTH] IN BLOOD BY AUTOMATED COUNT: 14.5 % (ref 11.5–14.5)
GFR SERPL CREATININE-BSD FRML MDRD: 75 ML/MIN/1.73SQ M
GLUCOSE P FAST SERPL-MCNC: 84 MG/DL (ref 65–99)
GLUCOSE SERPL-MCNC: 84 MG/DL (ref 65–99)
HCT VFR BLD AUTO: 35.1 % (ref 42–47)
HGB BLD-MCNC: 11.8 G/DL (ref 12–16)
LYMPHOCYTES # BLD AUTO: 1.9 THOUSANDS/ΜL (ref 0.6–4.47)
LYMPHOCYTES NFR BLD AUTO: 34 % (ref 21–51)
MCH RBC QN AUTO: 31 PG (ref 26–34)
MCHC RBC AUTO-ENTMCNC: 33.5 G/DL (ref 31–37)
MCV RBC AUTO: 92 FL (ref 81–99)
MONOCYTES # BLD AUTO: 0.6 THOUSAND/ΜL (ref 0.17–1.22)
MONOCYTES NFR BLD AUTO: 11 % (ref 2–12)
NEUTROPHILS # BLD AUTO: 2.8 THOUSANDS/ΜL (ref 1.4–6.5)
NEUTS SEG NFR BLD AUTO: 49 % (ref 42–75)
PLATELET # BLD AUTO: 234 THOUSANDS/UL (ref 149–390)
PMV BLD AUTO: 8.4 FL (ref 8.6–11.7)
POTASSIUM SERPL-SCNC: 4.2 MMOL/L (ref 3.5–5.5)
PROT SERPL-MCNC: 6 G/DL (ref 6.4–8.9)
RBC # BLD AUTO: 3.8 MILLION/UL (ref 3.9–5.2)
SODIUM SERPL-SCNC: 139 MMOL/L (ref 134–143)
WBC # BLD AUTO: 5.7 THOUSAND/UL (ref 4.8–10.8)

## 2021-07-06 PROCEDURE — 80053 COMPREHEN METABOLIC PANEL: CPT | Performed by: FAMILY MEDICINE

## 2021-07-06 PROCEDURE — 99232 SBSQ HOSP IP/OBS MODERATE 35: CPT | Performed by: PSYCHIATRY & NEUROLOGY

## 2021-07-06 PROCEDURE — 85025 COMPLETE CBC W/AUTO DIFF WBC: CPT | Performed by: FAMILY MEDICINE

## 2021-07-06 RX ORDER — OXYCODONE HYDROCHLORIDE 5 MG/1
5 TABLET ORAL EVERY 4 HOURS PRN
Status: DISCONTINUED | OUTPATIENT
Start: 2021-07-06 | End: 2021-07-07

## 2021-07-06 RX ADMIN — APIXABAN 5 MG: 5 TABLET, FILM COATED ORAL at 08:53

## 2021-07-06 RX ADMIN — GABAPENTIN 300 MG: 300 CAPSULE ORAL at 08:53

## 2021-07-06 RX ADMIN — OXYCODONE HYDROCHLORIDE 10 MG: 10 TABLET ORAL at 02:16

## 2021-07-06 RX ADMIN — METOPROLOL TARTRATE 25 MG: 25 TABLET, FILM COATED ORAL at 21:33

## 2021-07-06 RX ADMIN — OLANZAPINE 10 MG: 10 TABLET, FILM COATED ORAL at 21:32

## 2021-07-06 RX ADMIN — GABAPENTIN 300 MG: 300 CAPSULE ORAL at 21:32

## 2021-07-06 RX ADMIN — VENLAFAXINE HYDROCHLORIDE 225 MG: 150 CAPSULE, EXTENDED RELEASE ORAL at 08:53

## 2021-07-06 RX ADMIN — GABAPENTIN 300 MG: 300 CAPSULE ORAL at 16:23

## 2021-07-06 RX ADMIN — AMLODIPINE BESYLATE 5 MG: 5 TABLET ORAL at 08:53

## 2021-07-06 RX ADMIN — MELATONIN 3 MG: at 21:32

## 2021-07-06 RX ADMIN — PANTOPRAZOLE SODIUM 40 MG: 40 TABLET, DELAYED RELEASE ORAL at 05:49

## 2021-07-06 RX ADMIN — APIXABAN 5 MG: 5 TABLET, FILM COATED ORAL at 17:23

## 2021-07-06 RX ADMIN — OXYCODONE HYDROCHLORIDE 5 MG: 5 TABLET ORAL at 16:26

## 2021-07-06 RX ADMIN — PRAVASTATIN SODIUM 20 MG: 20 TABLET ORAL at 16:23

## 2021-07-06 RX ADMIN — METOPROLOL TARTRATE 25 MG: 25 TABLET, FILM COATED ORAL at 08:53

## 2021-07-06 RX ADMIN — OLANZAPINE 5 MG: 5 TABLET, FILM COATED ORAL at 08:53

## 2021-07-06 RX ADMIN — OXYCODONE HYDROCHLORIDE 10 MG: 10 TABLET ORAL at 08:08

## 2021-07-06 RX ADMIN — PHENYTOIN 2 MG: 125 SUSPENSION ORAL at 21:33

## 2021-07-06 RX ADMIN — OXYCODONE HYDROCHLORIDE 5 MG: 5 TABLET ORAL at 21:33

## 2021-07-06 RX ADMIN — ISOSORBIDE MONONITRATE 30 MG: 30 TABLET, EXTENDED RELEASE ORAL at 08:53

## 2021-07-06 RX ADMIN — LIDOCAINE 3 PATCH: 50 PATCH TOPICAL at 09:05

## 2021-07-06 NOTE — PROGRESS NOTES
07/06/21   Team Meeting   Meeting Type Daily Rounds   Team Members Present   Team Members Present Physician;Nurse;   Physician Team Member Dr Connie Justice MD   Nursing Team Member Bailee Whitman RN   Social Work Team Member Siva Beatty   Patient/Family Present   Patient Present No   Patient's Family Present No     No DC date - awaiting SNF placement; SSI letter not received - SW to re-request; reporting anxiety related to discharge planning; prns utilized - effective; slept all night

## 2021-07-06 NOTE — NURSING NOTE
Patient is awake at this time  She requested and received PRN Oxycodone for c/o severe pain to the lower back with a rating of 8/10  Will monitor for medication effectiveness

## 2021-07-06 NOTE — NURSING NOTE
No further c/o pain voiced by patient  She is asleep at this time  She appears to have slept through the majority of the overnight period, waking once to request and receive pain analgesia at 0216  Will CTM  Q7 minute safety checks in progress

## 2021-07-06 NOTE — PROGRESS NOTES
Progress Note - Behavioral Health   Ashley Moreno 77 y o  female MRN: [id number]  Unit/Bed#: Rissa Clark 201-01 Encounter: [id number]    Assessment/Plan   Principal Problem:    MDD (major depressive disorder), recurrent, severe, with psychosis ([facility] [address] )  Active Problems:    Essential hypertension    Chronic hand pain, right    Chronic low back pain    Osteoarthritis of lumbar spine with myelopathy    Vitamin D deficiency    CAD (coronary artery disease)    Behavior over the last 24 hours:  unchanged  Sleep: sleep on and off  Appetite: normal  Medication side effects: No  ROS: chronic pain, all other system are negative for acute changes    Mental Status Evaluation:  Appearance:  casually dressed and older than stated age   Behavior:  cooperative   Speech:  normal pitch and normal volume   Mood:  anxious mild   Affect:  mood-congruent   Thought Process:  goal directed   Thought Content:  no overt delusions   Perceptual Disturbances: None   Risk Potential: Suicidal Ideations none  Homicidal Ideations none  Potential for Aggression No   Sensorium:  person, place   Memory:  recent and remote memory grossly intact   Consciousness:  alert, awake    Attention: attention span and concentration were age appropriate   Insight:  limited   Judgment: fair   Gait/Station: normal balance   Motor Activity: no abnormal movements     Progress Toward Goals:  Patient has been behaviorally stable except intermittent episodes of increased anxiety and disruptive sleep  Calorie intake and medication compliant has been stable  Recommended Treatment: Continue with group therapy, milieu therapy and occupational therapy  SNF placement is in progress  Risks, benefits and possible side effects of Medications:   Risks, benefits, and possible side effects of medications explained to patient and patient verbalizes understanding  Medications: all current active meds have been reviewed  Labs:  I have personally reviewed all pertinent laboratory/tests results  Most Recent Labs:   Lab Results   Component Value Date    WBC 5 70 07/06/2021    RBC 3 80 (L) 07/06/2021    HGB 11 8 (L) 07/06/2021    HCT 35 1 (L) 07/06/2021     07/06/2021    RDW 14 5 07/06/2021    NEUTROABS 2 80 07/06/2021    SODIUM 139 07/06/2021    K 4 2 07/06/2021     07/06/2021    CO2 28 07/06/2021    BUN 17 07/06/2021    CREATININE 0 81 07/06/2021    GLUC 84 07/06/2021    GLUF 84 07/06/2021    CALCIUM 8 7 07/06/2021    AST 17 07/06/2021    ALT 19 07/06/2021    ALKPHOS 138 07/06/2021    TP 6 0 (L) 07/06/2021    ALB 3 5 07/06/2021    TBILI 0 40 07/06/2021    [id number] 1 838 06/13/2021       Counseling / Coordination of Care  Total floor / unit time spent today 20 minutes  Greater than 50% of total time was spent with the patient and / or family counseling and / or coordination of care

## 2021-07-06 NOTE — PLAN OF CARE
Problem: DISCHARGE PLANNING - CARE MANAGEMENT  Goal: Discharge to post-acute care or home with appropriate resources  Description: INTERVENTIONS:  - Conduct assessment to determine patient/family and health care team treatment goals, and need for post-acute services based on payer coverage, community resources, and patient preferences, and barriers to discharge  - Address psychosocial, clinical, and financial barriers to discharge as identified in assessment in conjunction with the patient/family and health care team  - Arrange appropriate level of post-acute services according to patients   needs and preference and payer coverage in collaboration with the physician and health care team  - Communicate with and update the patient/family, physician, and health care team regarding progress on the discharge plan  - Arrange appropriate transportation to post-acute venues  Outcome: Progressing     Pt progressing;  No DC date - awaiting admission date from [facility]

## 2021-07-06 NOTE — SOCIAL WORK
SW met with patient by pt phones; pt expressed anxiety re: DC planning and "not knowing what's going on"; SW advised pt is scheduled for video assessment tomorrow with Ivon Rosen - pt expressed relief; SW advised patient SSA award letter has not been received and a 2nd call will need to be made - pt agreeable; pt denies SI/HI/AH/VH, dep "a little", anx "a lot" due to discharge planning and uncertainty; SW provided reassurance as appropriate; no additional questions or concerns for SW; SW will continue to meet with patient as needed for tx and dc planning

## 2021-07-06 NOTE — PROGRESS NOTES
Progress Note - Bettie Justice 77 y o  female MRN: [id number]    Unit/Bed#Eudelia Code 201-01 Encounter: [id number]        Subjective:   Patient seen and examined at bedside after reviewing the chart and discussing the case with the caring staff  Patient examined at bedside  Patient continues to report back pain which seems poorly controlled on Ultram   Patient's oxycodone was increased to 10 mg yesterday, but patient states this makes her "loopy" and would like the dose decreased  I reviewed patient's CMP and CBC results from today  Patient is a possible discharge this week  Patient is going to a personal care home in [address] [place] [address]  Patient will be required requiring all her medications  I reviewed and reconciled patient's problem list and medications  Physical Exam   Vitals: Blood pressure 164/72, pulse 66, temperature 98 5 °F (36 9 °C), temperature source Temporal, resp  rate 18, height 5' 3" (1 6 m), weight 101 kg (221 lb 12 5 oz), SpO2 95 %  ,Body mass index is 39 29 kg/m²  Constitutional: He appears well-developed  HEENT: PERR, EOMI, MMM  Cardiovascular: Normal rate and regular rhythm  Pulmonary/Chest: Effort normal and breath sounds normal    Abdomen: Soft, + BS, NT    Assessment/Plan:  Bettie Justice is a(n) 77y o  year old female with MDD with psychotic symptoms    MEDICAL CLEARANCE  Patient is medically cleared for discharge  All scripts will be sent out for the patient once discharge is finalized to the relevant pharmacy      1  Cardiac with history of hypertension, dyslipidemia, CHF, atrial fibrillation, coronary artery disease status post pacemaker placement  Patient will be continued on metoprolol 25 mg b i d , amlodipine 5 mg daily, Imdur 30 mg daily, Pravachol 20 mg daily and Eliquis 5 mg 2 times daily  2  Neuropathy  Patient is on gabapentin 200 mg 4 times daily  3  GERD  Patient is on Protonix 40 mg daily  4  DJD/osteoarthritis   I will decreased oxycodone from 10 mg to 5 mg every 4 hours on as-needed basis for severe pain along with Lidoderm patch for shoulder pain and lower back pain  Patient may use heating pad  I will discontinue tramadol  5  Gait abnormality  I will consult PT OT for further evaluation and treatment  6  New vitamin-D deficiency  I will put the patient on vitamin-D bolus doses for 8 weeks followed by vitamin D3 1000 units daily  7  New vitamin B12 deficiency  I will put the patient on monthly B12 injections  8  Overgrown toenails and now acute right foot pain  Podiatry has been consulted  Patient's x-ray of the right foot completed on 06/28/2021 showed no acute osseous abnormalities  The patient was discussed with Dr Jamaica Malave and he is in agreement with the above note

## 2021-07-06 NOTE — NURSING NOTE
At time of assessment, patient was observed to be resting in bed  She is tearful at times, informing she spoke with her brother in law and was upset because they are both missing her sister who passed away in April 2021  She requested and received PRN Atarax which was effective in relieving symptoms of anxiety  She continues to endorse depression 9/10, denies SI, HI and hallucinations  She was medication compliant at HS  Lidoderm patches x 3 removed and discarded as per protocol  Attended group and snack time without issue  Will CTM via q7 minute safety checks

## 2021-07-06 NOTE — SOCIAL WORK
SW received email from [facility] requesting video conference to assess patient for admission tomorrow (Weds 7/7) - SW responded to email asking for time to schedule and SW will arrange video call - awaiting response     [employer] award letter NOT received via fax - SW and pt will need to contact [facility] again and request

## 2021-07-06 NOTE — PROGRESS NOTES
Prn oxycodone administered at patient request for c/o #7 back pain at 4:25 pm Patient stated PRN oxycodone was effective in decreasing pain  Patient present in community  Continue to monitor

## 2021-07-06 NOTE — SOCIAL WORK
SW placed phone call to [facility] ([phone]) - waited on hold 47 minutes - call disconnected from other end; SW will attempt to call [employer] again in morning to obtain copy of [employer] award letter

## 2021-07-06 NOTE — PROGRESS NOTES
Patient visible on the unit  She is social with staff and peers  Attended group  Denied depression  Rated anxiety 5/10  Denied SI,HI, or hallucinations  She stated she slept well last night and did not have any nightmares  C/o back pain 7-8 and pain in left finger  Requested and given PRN oxycodone at 8:09 am  Relief obtained with PRN medication  Lidoderm patches applied as ordered to bilateral shoulders and lower back  Q 7 minute safety checks maintained

## 2021-07-07 PROCEDURE — 99232 SBSQ HOSP IP/OBS MODERATE 35: CPT | Performed by: NURSE PRACTITIONER

## 2021-07-07 RX ORDER — OXYCODONE HYDROCHLORIDE 10 MG/1
10 TABLET ORAL EVERY 4 HOURS PRN
Status: DISCONTINUED | OUTPATIENT
Start: 2021-07-07 | End: 2021-07-22 | Stop reason: HOSPADM

## 2021-07-07 RX ADMIN — OLANZAPINE 10 MG: 10 TABLET, FILM COATED ORAL at 21:22

## 2021-07-07 RX ADMIN — GABAPENTIN 300 MG: 300 CAPSULE ORAL at 21:23

## 2021-07-07 RX ADMIN — METOPROLOL TARTRATE 25 MG: 25 TABLET, FILM COATED ORAL at 08:28

## 2021-07-07 RX ADMIN — AMLODIPINE BESYLATE 5 MG: 5 TABLET ORAL at 08:29

## 2021-07-07 RX ADMIN — METOPROLOL TARTRATE 25 MG: 25 TABLET, FILM COATED ORAL at 21:22

## 2021-07-07 RX ADMIN — ERGOCALCIFEROL 50000 UNITS: 1.25 CAPSULE ORAL at 08:29

## 2021-07-07 RX ADMIN — MELATONIN 3 MG: at 21:22

## 2021-07-07 RX ADMIN — PANTOPRAZOLE SODIUM 40 MG: 40 TABLET, DELAYED RELEASE ORAL at 06:17

## 2021-07-07 RX ADMIN — VENLAFAXINE HYDROCHLORIDE 225 MG: 150 CAPSULE, EXTENDED RELEASE ORAL at 08:28

## 2021-07-07 RX ADMIN — OXYCODONE HYDROCHLORIDE 5 MG: 5 TABLET ORAL at 04:50

## 2021-07-07 RX ADMIN — OXYCODONE HYDROCHLORIDE 10 MG: 10 TABLET ORAL at 11:55

## 2021-07-07 RX ADMIN — LIDOCAINE 3 PATCH: 50 PATCH TOPICAL at 09:23

## 2021-07-07 RX ADMIN — APIXABAN 5 MG: 5 TABLET, FILM COATED ORAL at 17:46

## 2021-07-07 RX ADMIN — GABAPENTIN 300 MG: 300 CAPSULE ORAL at 16:08

## 2021-07-07 RX ADMIN — OLANZAPINE 5 MG: 5 TABLET, FILM COATED ORAL at 08:29

## 2021-07-07 RX ADMIN — OXYCODONE HYDROCHLORIDE 10 MG: 10 TABLET ORAL at 21:21

## 2021-07-07 RX ADMIN — APIXABAN 5 MG: 5 TABLET, FILM COATED ORAL at 08:29

## 2021-07-07 RX ADMIN — PRAVASTATIN SODIUM 20 MG: 20 TABLET ORAL at 16:08

## 2021-07-07 RX ADMIN — PHENYTOIN 2 MG: 125 SUSPENSION ORAL at 21:22

## 2021-07-07 RX ADMIN — ISOSORBIDE MONONITRATE 30 MG: 30 TABLET, EXTENDED RELEASE ORAL at 08:29

## 2021-07-07 RX ADMIN — GABAPENTIN 300 MG: 300 CAPSULE ORAL at 08:29

## 2021-07-07 NOTE — PLAN OF CARE
Problem: DISCHARGE PLANNING - CARE MANAGEMENT  Goal: Discharge to post-acute care or home with appropriate resources  Description: INTERVENTIONS:  - Conduct assessment to determine patient/family and health care team treatment goals, and need for post-acute services based on payer coverage, community resources, and patient preferences, and barriers to discharge  - Address psychosocial, clinical, and financial barriers to discharge as identified in assessment in conjunction with the patient/family and health care team  - Arrange appropriate level of post-acute services according to patients   needs and preference and payer coverage in collaboration with the physician and health care team  - Communicate with and update the patient/family, physician, and health care team regarding progress on the discharge plan  - Arrange appropriate transportation to post-acute venues  Outcome: Progressing     Pt progressing;  Hopeful DC this week to [facility] - Assessment today at [address] [place] [address] by Ivon Rosen [facility]

## 2021-07-07 NOTE — PROGRESS NOTES
Progress Note - Elisabet Multani 77 y o  female MRN: [id number]    Unit/Bed#Radha Almazan 201-01 Encounter: [id number]        Subjective:   Patient seen and examined at bedside after reviewing the chart and discussing the case with the caring staff  Patient examined at bedside  Patient continues to report shoulder and back pain and is requesting her oxycodone to be increased again to 10 mg  She stated it makes her "loopy" for approximately 30 minutes but states she will stay in bed until that feeling goes away  Patient is a possible discharge this week  Patient is going to a personal care home in [address] [place] [address]  Patient will be required requiring all her medications  I reviewed and reconciled patient's problem list and medications  Physical Exam   Vitals: Blood pressure 149/69, pulse 71, temperature (!) 97 1 °F (36 2 °C), temperature source Temporal, resp  rate 18, height 5' 3" (1 6 m), weight 101 kg (221 lb 12 5 oz), SpO2 94 %  ,Body mass index is 39 29 kg/m²  Constitutional: He appears well-developed  HEENT: PERR, EOMI, MMM  Cardiovascular: Normal rate and regular rhythm  Pulmonary/Chest: Effort normal and breath sounds normal    Abdomen: Soft, + BS, NT    Assessment/Plan:  Elisabet Multani is a(n) 77y o  year old female with MDD with psychotic symptoms    MEDICAL CLEARANCE  Patient is medically cleared for discharge  All scripts will be sent out for the patient once discharge is finalized to the relevant pharmacy      1  Cardiac with history of hypertension, dyslipidemia, CHF, atrial fibrillation, coronary artery disease status post pacemaker placement  Patient will be continued on metoprolol 25 mg b i d , amlodipine 5 mg daily, Imdur 30 mg daily, Pravachol 20 mg daily and Eliquis 5 mg 2 times daily  2  Neuropathy  Patient is on gabapentin 200 mg 4 times daily  3  GERD  Patient is on Protonix 40 mg daily  4  DJD/osteoarthritis   I will increase oxycodone to 10 mg 4 hours on as-needed basis for severe pain along with Lidoderm patch for shoulder pain and lower back pain  Patient may use heating pad  I will discontinue tramadol  5  Gait abnormality  I will consult PT OT for further evaluation and treatment  6  New vitamin-D deficiency  I will put the patient on vitamin-D bolus doses for 8 weeks followed by vitamin D3 1000 units daily  7  New vitamin B12 deficiency  I will put the patient on monthly B12 injections  8  Overgrown toenails and now acute right foot pain  Podiatry has been consulted  Patient's x-ray of the right foot completed on 06/28/2021 showed no acute osseous abnormalities  The patient was discussed with Dr Ronak Amezcua and he is in agreement with the above note

## 2021-07-07 NOTE — PROGRESS NOTES
Progress Note - Behavioral Health   [place] Shana 77 y o  female MRN: [id number]  Unit/Bed#: Liz Chaves 201-01 Encounter: [id number]    Assessment/Plan   Principal Problem:    MDD (major depressive disorder), recurrent, severe, with psychosis ([address] )  Active Problems:    Essential hypertension    Chronic hand pain, right    Chronic low back pain    Osteoarthritis of lumbar spine with myelopathy    Vitamin D deficiency    CAD (coronary artery disease)      Subjective:Patient was seen today for continuation of care, records reviewed and  patient was discussed with the morning case review team       Patient seen today for psychiatric follow-up  She reports that she is waiting to get into a personal care home in [place]  She is reporting severe back pain, 10/10  She is slightly pressured in conversation and preoccupied with her back pain and pain killers  She reports that she has had no nightmares recently, and no depression  She does report some anxiety which is related to her discharge and going to the personal care home  She rates her anxiety at 6/10, 10 being worst symptoms  She denies suicidal thoughts, homicidal thoughts  She denies any auditory or visual hallucinations  She states that she is good, and anxious for discharge  She was seen by medical in regards to her request for an increase in her oxycodone  She reports good sleep, and good appetite        Psychiatric Review of Systems:    Sleep: normal  Appetite: normal  Medication side effects: No   ROS: reports back pain, all other systems are negative    Vitals:  Vitals:    07/07/21 0722   BP: 149/69   Pulse: 71   Resp: 18   Temp: (!) 97 1 °F (36 2 °C)   SpO2: 94%       Mental Status Evaluation:    Appearance:  age appropriate, casually dressed   Behavior:  cooperative   Speech:  normal volume, normal pitch, pressured   Mood:  euthymic   Affect:  appropriate, mood-congruent   Thought Process:  coherent, perseverative   Associations: perseverative Thought Content:  no overt delusions   Perceptual Disturbances: no auditory hallucinations, no visual hallucinations   Risk Potential: Suicidal ideation - None  Homicidal ideation - None  Potential for aggression - No   Sensorium:  oriented to person, place, time/date and situation   Memory:  recent and remote memory grossly intact   Consciousness:  alert and awake   Attention: attention span and concentration appear shorter than expected for age   Insight:  limited   Judgment: limited   Gait/Station: normal gait/station   Motor Activity: no abnormal movements     Laboratory results:    I have personally reviewed all pertinent laboratory/tests results  Most Recent Labs:   Lab Results   Component Value Date    WBC 5 70 07/06/2021    RBC 3 80 (L) 07/06/2021    HGB 11 8 (L) 07/06/2021    HCT 35 1 (L) 07/06/2021     07/06/2021    RDW 14 5 07/06/2021    NEUTROABS 2 80 07/06/2021    SODIUM 139 07/06/2021    K 4 2 07/06/2021     07/06/2021    CO2 28 07/06/2021    BUN 17 07/06/2021    CREATININE 0 81 07/06/2021    GLUC 84 07/06/2021    GLUF 84 07/06/2021    CALCIUM 8 7 07/06/2021    AST 17 07/06/2021    ALT 19 07/06/2021    ALKPHOS 138 07/06/2021    TP 6 0 (L) 07/06/2021    ALB 3 5 07/06/2021    TBILI 0 40 07/06/2021    HBY3JIRBPKPB 1 838 06/13/2021       Progress Toward Goals:     Potential discharge on Friday, 07/09/2021  She continues to progress towards her goals, his calm, cooperative and appropriate on the unit  Will continue her medications as ordered and continue planning for discharge      Recommended Treatment:     All current active medications have been reviewed  Encourage group therapy, milieu therapy and occupational therapy  Behavioral Health checks every 7 minutes  Possible discharge in 1 or 2 days if continues to improve  Continue current medications:  Current Facility-Administered Medications   Medication Dose Route Frequency Provider Last Rate    acetaminophen  650 mg Oral Q4H PRN Richie Cochran Nicky, JOSE      acetaminophen  650 mg Oral Q4H PRN Andrew Denver, CRNP      aluminum-magnesium hydroxide-simethicone  30 mL Oral Q4H PRN Andrew Denver, CRNP      amLODIPine  5 mg Oral Daily Andrew Denver, CRNP      apixaban  5 mg Oral BID Andrew Denver, CRNP      [START ON 8/4/2021] cholecalciferol  1,000 Units Oral Daily Alyssa Jack MD      Diclofenac Sodium  2 g Topical 4x Daily PRN Doreen Dawkins PA-C      ergocalciferol  50,000 Units Oral Weekly Alyssa Jack MD      gabapentin  300 mg Oral TID Lorraine Fenton MD      hydrOXYzine HCL  25 mg Oral Q6H PRN Max 4/day Andrew Denver, CRNP      hydrOXYzine HCL  50 mg Oral Q6H PRN Andrew Denver, CRNP      isosorbide mononitrate  30 mg Oral Daily Andrew Denver, CRNP      lidocaine  3 patch Topical Daily Alyssa Jack MD      LORazepam  0 5 mg Oral Q8H PRN Andrew Denver, CRNP      melatonin  3 mg Oral HS Andrew Denver, CRNP      metoprolol tartrate  25 mg Oral Q12H [address] Andrew Denver, CRNP      OLANZapine  5 mg Intramuscular Q6H PRN Max 4/day Andrew Denver, CRNP      OLANZapine  10 mg Oral HS Lorraine Fenton MD      OLANZapine  2 5 mg Oral Q6H PRN Max 4/day Andrew Denver, CRNP      OLANZapine  5 mg Oral Q6H PRN Max 4/day Andrew Denver, CRNP      OLANZapine  5 mg Oral Daily Lorraine Fenton MD      ondansetron  4 mg Oral Q6H PRN Alyssa Jack MD      oxyCODONE  10 mg Oral Q4H PRN Doreen Dawkins PA-C      pantoprazole  40 mg Oral Early Morning Andrew Denver, CRNP      polyethylene glycol  17 g Oral Daily PRN Andrew Denver, CRNP      pravastatin  20 mg Oral Daily With [facility] Medei, JOSE      prazosin  2 mg Oral HS Andrew Denver, CRNP      risperiDONE  0 5 mg Oral Q6H PRN Max 4/day Andrew Denver, CRNP      senna-docusate sodium  1 tablet Oral Daily PRN Andrew Denver, CRNP      traZODone  50 mg Oral HS PRN Andrew Denver, CRNP      venlafaxine  225 mg Oral Daily Andrew Denver, CRNP         Risks / Benefits of Treatment:     Risks, benefits, and possible side effects of medications explained to patient and patient verbalizes understanding and agreement for treatment  Counseling / Coordination of Care:     Patient's progress reviewed with nursing staff  Medications, treatment progress and treatment plan reviewed with patient  Supportive counseling provided to the patient      JOSE Linn

## 2021-07-07 NOTE — PROGRESS NOTES
07/07/21   Team Meeting   Meeting Type Daily Rounds   Team Members Present   Team Members Present Physician;Nurse;   Physician Team Member Dr Denise Vasquez MD   Nursing Team Member Alejandro Schuler RN    Social Work Team Member Siva Johnson   Patient/Family Present   Patient Present No   Patient's Family Present No     No DC date - awaiting [facility] placement - [facility] assessment today at [phone]; slept; anx 5 - denies everything else; no nightmares; less intrusive - redirectable; helpful with peers

## 2021-07-07 NOTE — NURSING NOTE
Patient was visible in the community this evening  She is pleasant and cooperative during staff interactions  Social with peers and continues to be intrusive at times with other patients  Her speech continues to be loud  She endorses anxiety with a rating of 2/10, denies depression, denies SI, HI and hallucinations  She c/o 7/10 lower back pain for which she requested and received PRN Oxycodone at 2133; obtained relief from PRN  She was medication compliant at HS  Lidoderm patches x 3 removed and discarded per protocol  Will CTM via q7 minute safety checks

## 2021-07-07 NOTE — NURSING NOTE
Patient was able to sleep through most of the overnight hours, however did wake briefly to utilize the bathroom and socialize with her roommate  She is in bed sleeping at this time  No acute behaviors observed  No further c/o pain voiced by patient  PRN Oxycodone given at 0450 for severe lower back pain appears to be effective  Will CTM via q7 minute safety checks

## 2021-07-07 NOTE — NURSING NOTE
Patient requested and received PRN Oxycodone for c/o severe lower back pain with a rating of 7/10  Will monitor for medication effectiveness

## 2021-07-07 NOTE — SOCIAL WORK
SW and pt contacted [facility] to acquire award letter for [facility] placement; requested with Vicetne Rueda  - awaiting fax

## 2021-07-07 NOTE — SOCIAL WORK
SW and patient completed video call with Cecilio Ashford; pt accepted for [facility] placement; pt will need [facility] award letter as well as transfer of MA to [facility]; pt will also need COVID swab prior to admission; pt can discharge with completion of these items

## 2021-07-07 NOTE — SOCIAL WORK
[facility] received [facility] award letter via fax     SW and patient placed phone call to Medicaid ([phone]) to change address and request [place]; MA unable to complete request due to pt not completing re-certification as required yearly; MA will have  contact CARTER directly to determine how to complete re-certification while patient is hospitalized; [facility]TER requested prioritization as the [place] transfer will hold up patient discharge; MA rep, Mr Lisseth Blake, advised  there is no prioritization available and if  has not called SW by end of 3rd business day, SW to call MA again to have supervisor contact     CARTER sent email to [employer] to provide [employer] letter and update on address change - awaiting response

## 2021-07-07 NOTE — PROGRESS NOTES
Patient has been visible on the unit throughout the day  Social with staff and peers  Intrusive at times  Loud  She slept last night  Medication compliant  C/o pain #10 in back requested and given oxycodone 10 mg  at 11:50 am  Patient obtained relief  Rated anxiety #5  Denied depression,SI,HI, or hallucinations  Q 7 minute safety checks maintained

## 2021-07-08 PROCEDURE — 99231 SBSQ HOSP IP/OBS SF/LOW 25: CPT | Performed by: PHYSICIAN ASSISTANT

## 2021-07-08 RX ADMIN — AMLODIPINE BESYLATE 5 MG: 5 TABLET ORAL at 08:45

## 2021-07-08 RX ADMIN — VENLAFAXINE HYDROCHLORIDE 225 MG: 150 CAPSULE, EXTENDED RELEASE ORAL at 08:45

## 2021-07-08 RX ADMIN — OXYCODONE HYDROCHLORIDE 10 MG: 10 TABLET ORAL at 03:37

## 2021-07-08 RX ADMIN — APIXABAN 5 MG: 5 TABLET, FILM COATED ORAL at 17:48

## 2021-07-08 RX ADMIN — PANTOPRAZOLE SODIUM 40 MG: 40 TABLET, DELAYED RELEASE ORAL at 05:53

## 2021-07-08 RX ADMIN — GABAPENTIN 300 MG: 300 CAPSULE ORAL at 21:27

## 2021-07-08 RX ADMIN — METOPROLOL TARTRATE 25 MG: 25 TABLET, FILM COATED ORAL at 21:28

## 2021-07-08 RX ADMIN — OXYCODONE HYDROCHLORIDE 10 MG: 10 TABLET ORAL at 14:37

## 2021-07-08 RX ADMIN — MELATONIN 3 MG: at 21:27

## 2021-07-08 RX ADMIN — GABAPENTIN 300 MG: 300 CAPSULE ORAL at 16:21

## 2021-07-08 RX ADMIN — GABAPENTIN 300 MG: 300 CAPSULE ORAL at 08:45

## 2021-07-08 RX ADMIN — PHENYTOIN 2 MG: 125 SUSPENSION ORAL at 21:27

## 2021-07-08 RX ADMIN — PRAVASTATIN SODIUM 20 MG: 20 TABLET ORAL at 16:21

## 2021-07-08 RX ADMIN — OXYCODONE HYDROCHLORIDE 10 MG: 10 TABLET ORAL at 07:56

## 2021-07-08 RX ADMIN — ISOSORBIDE MONONITRATE 30 MG: 30 TABLET, EXTENDED RELEASE ORAL at 08:46

## 2021-07-08 RX ADMIN — APIXABAN 5 MG: 5 TABLET, FILM COATED ORAL at 08:46

## 2021-07-08 RX ADMIN — OLANZAPINE 10 MG: 10 TABLET, FILM COATED ORAL at 21:28

## 2021-07-08 RX ADMIN — OLANZAPINE 5 MG: 5 TABLET, FILM COATED ORAL at 08:46

## 2021-07-08 RX ADMIN — METOPROLOL TARTRATE 25 MG: 25 TABLET, FILM COATED ORAL at 08:46

## 2021-07-08 RX ADMIN — LIDOCAINE 3 PATCH: 50 PATCH TOPICAL at 09:19

## 2021-07-08 RX ADMIN — OXYCODONE HYDROCHLORIDE 10 MG: 10 TABLET ORAL at 20:10

## 2021-07-08 NOTE — PROGRESS NOTES
07/08/21 0730   Activity/Group Checklist   Group   [employer] and Goals)   Attendance Attended   Attendance Duration (min) 46-60   Interactions Interacted appropriately   Affect/Mood Appropriate;Calm   Goals Achieved Able to listen to others; Able to engage in interactions

## 2021-07-08 NOTE — PROGRESS NOTES
Progress Note - Behavioral Health     Magaly Abernathy 77 y o  female MRN: [id number]   Unit/Bed#: [facility] 201-01 Encounter: [id number]    Behavior over the last 24 hours: slowly improving  Luciano Packer seen in her room  States she is looking forward to discharge and has a "positive outlook"  States she has had some crying spells thinking about her  sister ( of cancer in April she says) and then wakes up crying from dreams she can't remember about 3x in past 2 weeks  Staff report some intrusiveness and this is seen this afternoon as Luciano Packer interrupts her roommate on a few occassions  Sleep: overall good  Appetite: normal  Medication side effects:denies dizziness, headaches  ROS: no complaints    Mental Status Evaluation:    Appearance:  age appropriate, casually dressed   Behavior:  pleasant, cooperative   Speech:  a bit loud, talkative   Mood:  improved   Affect:  increased in intensity   Thought Process:  circumstantial   Associations: circumstantial associations   Thought Content:  no overt delusions   Perceptual Disturbances: none   Risk Potential: Suicidal ideation - None  Homicidal ideation - None   Sensorium:  oriented to person, place and time/date   Memory:  recent and remote memory grossly intact   Consciousness:  alert and awake   Attention: attention span and concentration are age appropriate   Insight:  fair   Judgment: fair   Gait/Station: normal gait/station   Motor Activity: no abnormal movements     Vital signs in last 24 hours:    Temp:  [97 3 °F (36 3 °C)-97 8 °F (36 6 °C)] 97 8 °F (36 6 °C)  HR:  [62-67] 62  Resp:  [16-18] 18  BP: (118-141)/(56-83) 118/56    Laboratory results: I have personally reviewed all pertinent laboratory/tests results          Assessment/Plan   Principal Problem:    MDD (major depressive disorder), recurrent, severe, with psychosis ([facility] )  Active Problems:    Essential hypertension    Chronic hand pain, right    Chronic low back pain Osteoarthritis of lumbar spine with myelopathy    Vitamin D deficiency    CAD (coronary artery disease)    Recommended Treatment: cont present treatment    Planned medication and treatment changes: No me changes    All current active medications have been reviewed  Encourage group therapy, milieu therapy and occupational therapy  Behavioral Health checks every 7 minutes  Current Facility-Administered Medications   Medication Dose Route Frequency Provider Last Rate    acetaminophen  650 mg Oral Q4H PRN Reggie Bold, CRNP      acetaminophen  650 mg Oral Q4H PRN Reggie Bold, CRNP      aluminum-magnesium hydroxide-simethicone  30 mL Oral Q4H PRN Reggie Bold, CRNP      amLODIPine  5 mg Oral Daily Reggie Bold, CRNP      apixaban  5 mg Oral BID Reggie Bold, CRNP      [START ON 8/4/2021] cholecalciferol  1,000 Units Oral Daily Mireya Ray MD      Diclofenac Sodium  2 g Topical 4x Daily PRN Doreen Dawkins PA-C      ergocalciferol  50,000 Units Oral Weekly Mireya Ray MD      gabapentin  300 mg Oral TID Suresh Durbin MD      hydrOXYzine HCL  25 mg Oral Q6H PRN Max 4/day Reggie Bold, CRNP      hydrOXYzine HCL  50 mg Oral Q6H PRN Reggie Bold, CRNP      isosorbide mononitrate  30 mg Oral Daily Reggie Bold, CRNP      lidocaine  3 patch Topical Daily Mireya Ray MD      LORazepam  0 5 mg Oral Q8H PRN Reggie Bold, CRNP      melatonin  3 mg Oral HS Reggie Bold, CRNP      metoprolol tartrate  25 mg Oral Q12H [address] Reggie Bold, CRNP      OLANZapine  5 mg Intramuscular Q6H PRN Max 4/day Reggie Bold, CRNP      OLANZapine  10 mg Oral HS Suresh Durbin MD      OLANZapine  2 5 mg Oral Q6H PRN Max 4/day Reggie Bold, CRNP      OLANZapine  5 mg Oral Q6H PRN Max 4/day Reggie Bold, CRNP      OLANZapine  5 mg Oral Daily Suresh Durbin MD      ondansetron  4 mg Oral Q6H PRN Mireya Ray MD      oxyCODONE  10 mg Oral Q4H PRN Doreen Dawkins PA-C      pantoprazole  40 mg Oral Early Morning Reggie Bold, CRNP  polyethylene glycol  17 g Oral Daily PRN Geronimo Dago, CRNP      pravastatin  20 mg Oral Daily With [facility], CRNP      prazosin  2 mg Oral HS [place] Dago, CRNP      risperiDONE  0 5 mg Oral Q6H PRN Max 4/day [place] Dago, CRNP      senna-docusate sodium  1 tablet Oral Daily PRN Geronimo Dago, CRNP      traZODone  50 mg Oral HS PRN Geronimo Dago, CRNP      venlafaxine  225 mg Oral Daily [place] Dago, CRNP         Risks / Benefits of Treatment:    Risks, benefits, and possible side effects of medications explained to patient and patient verbalizes understanding and agreement for treatment  Counseling / Coordination of Care: Total floor / unit time spent today 15 minutes  Greater than 50% of total time was spent with the patient and / or family counseling and / or coordination of care  A description of counseling / coordination of care:  Patient's progress discussed with staff in treatment team meeting  Medications, treatment progress and treatment plan reviewed with patient      Tashi Chavarria PA-C 07/08/21

## 2021-07-08 NOTE — PROGRESS NOTES
Patient visible on the unit  Slept well  Denies any nightmares   Denied anxiety,depression,SI,HI, or hallucinations  C/o #8 back pain and requested prn oxycodone  PRN oxycodone was effective in decreasing pain to #4  Social with staff and peers  Speech is loud  Medication compliant  Q 7 minute safety checks maintained

## 2021-07-08 NOTE — PROGRESS NOTES
Awake once for medication for increased back pain  No noted suicidal ideations or homicidal behaviors  Fluids at bedside to promote hydration  No aspiration risks noted  Patient observed sleeping during most q 7 minute safety checks  Stated she slept well with no nightmare  No changes in medical condition  Will continue to monitor

## 2021-07-08 NOTE — PROGRESS NOTES
07/08/21 1000   Activity/Group Checklist   Group   (Self-Esteem Melida)   Attendance Did not attend  (AT group offered; pt elected to remain in room)

## 2021-07-08 NOTE — PROGRESS NOTES
07/08/21   Team Meeting   Meeting Type Daily Rounds   Team Members Present   Team Members Present Physician;Nurse;   Physician Team Member Dr Serena Goss MD   Nursing Team Member Rina Johns RN   Social Work Team Member Siva Flores   Patient/Family Present   Patient Present No   Patient's Family Present No     No DC date - accepted by [facility] - awaiting MA re-certification for DC; denies nightmares - up x 2 overnight; c/o back pain - fixated on pain meds - utilizes often; denies SI/HI/AH/VH

## 2021-07-08 NOTE — PLAN OF CARE
Problem: DISCHARGE PLANNING - CARE MANAGEMENT  Goal: Discharge to post-acute care or home with appropriate resources  Description: INTERVENTIONS:  - Conduct assessment to determine patient/family and health care team treatment goals, and need for post-acute services based on payer coverage, community resources, and patient preferences, and barriers to discharge  - Address psychosocial, clinical, and financial barriers to discharge as identified in assessment in conjunction with the patient/family and health care team  - Arrange appropriate level of post-acute services according to patients   needs and preference and payer coverage in collaboration with the physician and health care team  - Communicate with and update the patient/family, physician, and health care team regarding progress on the discharge plan  - Arrange appropriate transportation to post-acute venues  Outcome: Progressing     Pt progressing - accepted at [facility] - awaiting MA transfer

## 2021-07-08 NOTE — PROGRESS NOTES
Progress Note - Lucrecia Reilly 77 y o  female MRN: [id number]    Unit/Bed#[facility] 201-01 Encounter: [id number]        Subjective:   Patient seen and examined at bedside after reviewing the chart and discussing the case with the caring staff  Patient examined at bedside  Patient states her shoulder and back pain is improved    Patient is a being discharged tomorrow 07/09/2021  Patient is going to a personal care home in [address] [place] [address]  Patient will be required requiring all her medications  I reviewed and reconciled patient's problem list and medications  Physical Exam   Vitals: Blood pressure 118/56, pulse 62, temperature 97 8 °F (36 6 °C), temperature source Temporal, resp  rate 18, height 5' 3" (1 6 m), weight 101 kg (221 lb 12 5 oz), SpO2 97 %  ,Body mass index is 39 29 kg/m²  Constitutional: He appears well-developed  HEENT: PERR, EOMI, MMM  Cardiovascular: Normal rate and regular rhythm  Pulmonary/Chest: Effort normal and breath sounds normal    Abdomen: Soft, + BS, NT    Assessment/Plan:  Lucrecia Reilly is a(n) 77y o  year old female with MDD with psychotic symptoms    MEDICAL CLEARANCE  Patient is medically cleared for discharge  All scripts will be sent out for the patient once discharge is finalized to the relevant pharmacy      1  Cardiac with history of hypertension, dyslipidemia, CHF, atrial fibrillation, coronary artery disease status post pacemaker placement  Patient will be continued on metoprolol 25 mg b i d , amlodipine 5 mg daily, Imdur 30 mg daily, Pravachol 20 mg daily and Eliquis 5 mg 2 times daily  2  Neuropathy  Patient is on gabapentin 200 mg 4 times daily  3  GERD  Patient is on Protonix 40 mg daily  4  DJD/osteoarthritis  I will increase oxycodone to 10 mg 4 hours on as-needed basis for severe pain along with Lidoderm patch for shoulder pain and lower back pain  Patient may use heating pad  I will discontinue tramadol  5  Gait abnormality    I will consult PT OT for further evaluation and treatment  6  New vitamin-D deficiency  I will put the patient on vitamin-D bolus doses for 8 weeks followed by vitamin D3 1000 units daily  7  New vitamin B12 deficiency  I will put the patient on monthly B12 injections  8  Overgrown toenails and now acute right foot pain  Podiatry has been consulted  Patient's x-ray of the right foot completed on 06/28/2021 showed no acute osseous abnormalities  The patient was discussed with Dr Carolina Spencer and he is in agreement with the above note

## 2021-07-09 PROCEDURE — 99232 SBSQ HOSP IP/OBS MODERATE 35: CPT | Performed by: NURSE PRACTITIONER

## 2021-07-09 RX ADMIN — OLANZAPINE 5 MG: 5 TABLET, FILM COATED ORAL at 08:36

## 2021-07-09 RX ADMIN — OXYCODONE HYDROCHLORIDE 10 MG: 10 TABLET ORAL at 19:55

## 2021-07-09 RX ADMIN — OXYCODONE HYDROCHLORIDE 10 MG: 10 TABLET ORAL at 14:49

## 2021-07-09 RX ADMIN — PRAVASTATIN SODIUM 20 MG: 20 TABLET ORAL at 15:57

## 2021-07-09 RX ADMIN — ISOSORBIDE MONONITRATE 30 MG: 30 TABLET, EXTENDED RELEASE ORAL at 08:36

## 2021-07-09 RX ADMIN — AMLODIPINE BESYLATE 5 MG: 5 TABLET ORAL at 08:36

## 2021-07-09 RX ADMIN — METOPROLOL TARTRATE 25 MG: 25 TABLET, FILM COATED ORAL at 22:15

## 2021-07-09 RX ADMIN — OXYCODONE HYDROCHLORIDE 10 MG: 10 TABLET ORAL at 02:14

## 2021-07-09 RX ADMIN — OLANZAPINE 10 MG: 10 TABLET, FILM COATED ORAL at 22:14

## 2021-07-09 RX ADMIN — VENLAFAXINE HYDROCHLORIDE 225 MG: 150 CAPSULE, EXTENDED RELEASE ORAL at 08:35

## 2021-07-09 RX ADMIN — GABAPENTIN 300 MG: 300 CAPSULE ORAL at 22:15

## 2021-07-09 RX ADMIN — METOPROLOL TARTRATE 25 MG: 25 TABLET, FILM COATED ORAL at 08:35

## 2021-07-09 RX ADMIN — LIDOCAINE 3 PATCH: 50 PATCH TOPICAL at 08:37

## 2021-07-09 RX ADMIN — GABAPENTIN 300 MG: 300 CAPSULE ORAL at 08:36

## 2021-07-09 RX ADMIN — APIXABAN 5 MG: 5 TABLET, FILM COATED ORAL at 08:36

## 2021-07-09 RX ADMIN — MELATONIN 3 MG: at 22:15

## 2021-07-09 RX ADMIN — PANTOPRAZOLE SODIUM 40 MG: 40 TABLET, DELAYED RELEASE ORAL at 06:07

## 2021-07-09 RX ADMIN — OXYCODONE HYDROCHLORIDE 10 MG: 10 TABLET ORAL at 09:18

## 2021-07-09 RX ADMIN — GABAPENTIN 300 MG: 300 CAPSULE ORAL at 15:57

## 2021-07-09 RX ADMIN — PHENYTOIN 2 MG: 125 SUSPENSION ORAL at 22:14

## 2021-07-09 RX ADMIN — APIXABAN 5 MG: 5 TABLET, FILM COATED ORAL at 17:42

## 2021-07-09 NOTE — PROGRESS NOTES
07/09/21 1000   Activity/Group Checklist   Group   (Safe Space [employer]age)   Attendance Attended   Attendance Duration (min) Greater than 60   Interactions Interacted appropriately   Affect/Mood Appropriate;Bright   Goals Achieved Identified feelings; Displayed empathy;Able to listen to others; Able to engage in interactions; Able to self-disclose; Able to recieve feedback; Able to give feedback to another

## 2021-07-09 NOTE — SOCIAL WORK
SW and patient placed phone call to [facility] -[phone] to complete MA recertification process - left message on voicemail - awaiting response

## 2021-07-09 NOTE — PROGRESS NOTES
07/09/21   Team Meeting   Meeting Type Daily Rounds   Team Members Present   Team Members Present Physician;Nurse;   Physician Team Member Dr Howard Hairston MD   Nursing Team Member Yovani Boudreaux RN   Social Work Team Member MATA Quintanilla   Patient/Family Present   Patient Present No   Patient's Family Present No     No DC Date - accepted [facility] - awaiting [place] of [place] to DC; dramatic/somoatic at times; intrusive with peers at times; denies everything; slept - up x2 (pain); prns utilized

## 2021-07-09 NOTE — NURSING NOTE
Patient was observed to be visible on the unit this evening, attending group and snack time  She presents with an irritable edge  Speech continues to be loud  Patient continues to be intrusive (despite verbal redirection) with other patients, particularly her roommate  She c/o lower back pain with a rating of 7/10  Patient requested and received PRN Oxycodone as per order for severe pain at 2010 for which she did obtain relief  Endorses anxiety 7/10, denies depression, denies SI, HI and hallucinations  Patient was medication compliant at HS; lidoderm patches x 3 removed and discarded per protocol  Will CTM via q7 minute safety checks

## 2021-07-09 NOTE — PROGRESS NOTES
Progress Note - Jone Cross 77 y o  female MRN: [id number]    Unit/Bed#[facility] 201-01 Encounter: [id number]        Subjective:   Patient seen and examined at bedside after reviewing the chart and discussing the case with the caring staff  Patient examined at bedside  Patient states her right foot pain is improving and she no longer noticed his swelling  She otherwise has no acute issues  Patient is going to a personal care home in [address] [place] [address]  Patient will be required requiring all her medications  I reviewed and reconciled patient's problem list and medications  Physical Exam   Vitals: Blood pressure 153/67, pulse 72, temperature (!) 97 4 °F (36 3 °C), temperature source Temporal, resp  rate 18, height 5' 3" (1 6 m), weight 101 kg (221 lb 12 5 oz), SpO2 94 %  ,Body mass index is 39 29 kg/m²  Constitutional: He appears well-developed  HEENT: PERR, EOMI, MMM  Cardiovascular: Normal rate and regular rhythm  Pulmonary/Chest: Effort normal and breath sounds normal    Abdomen: Soft, + BS, NT    Assessment/Plan:  Jone Cross is a(n) 77y o  year old female with MDD with psychotic symptoms    MEDICAL CLEARANCE  Patient is medically cleared for discharge  All scripts will be sent out for the patient once discharge is finalized to the relevant pharmacy      1  Cardiac with history of hypertension, dyslipidemia, CHF, atrial fibrillation, coronary artery disease status post pacemaker placement  Patient will be continued on metoprolol 25 mg b i d , amlodipine 5 mg daily, Imdur 30 mg daily, Pravachol 20 mg daily and Eliquis 5 mg 2 times daily  2  Neuropathy  Patient is on gabapentin 200 mg 4 times daily  3  GERD  Patient is on Protonix 40 mg daily  4  DJD/osteoarthritis  I will increase oxycodone to 10 mg 4 hours on as-needed basis for severe pain along with Lidoderm patch for shoulder pain and lower back pain  Patient may use heating pad  I will discontinue tramadol    5  Gait abnormality  I will consult PT OT for further evaluation and treatment  6  Vitamin-D deficiency  Patient is on vitamin-D bolus doses for 8 weeks followed by vitamin D3 1000 units daily  7  Vitamin B12 deficiency  Patient is on monthly B12 injections  8  Acute right foot pain  Patient's x-ray of the right foot completed on 06/28/2021 showed no acute osseous abnormalities  The patient was discussed with Dr Megan Man and he is in agreement with the above note

## 2021-07-09 NOTE — PROGRESS NOTES
07/09/21 0730   Activity/Group Checklist   Group   (Community Meeting and Goal Planning)   Attendance Attended   Attendance Duration (min) 31-45   Interactions Interacted appropriately   Affect/Mood Appropriate;Calm   Goals Achieved Able to listen to others; Able to engage in interactions

## 2021-07-09 NOTE — NURSING NOTE
Patient is awake at this time  She c/o lower back pain with a rating of 8/10  She requested and received PRN Oxycodone  Will monitor for medication effectiveness

## 2021-07-09 NOTE — NURSING NOTE
No further complaints of pain voiced by patient  After receiving the PRN Oxycodone at 0214, patient was able to return to sleep and has remained in bed since  No acute behaviors observed  She appears to have slept through the majority of the overnight period  Will CTM via q7 minute safety checks

## 2021-07-09 NOTE — PLAN OF CARE
Problem: DISCHARGE PLANNING - CARE MANAGEMENT  Goal: Discharge to post-acute care or home with appropriate resources  Description: INTERVENTIONS:  - Conduct assessment to determine patient/family and health care team treatment goals, and need for post-acute services based on payer coverage, community resources, and patient preferences, and barriers to discharge  - Address psychosocial, clinical, and financial barriers to discharge as identified in assessment in conjunction with the patient/family and health care team  - Arrange appropriate level of post-acute services according to patients   needs and preference and payer coverage in collaboration with the physician and health care team  - Communicate with and update the patient/family, physician, and health care team regarding progress on the discharge plan  - Arrange appropriate transportation to post-acute venues  Outcome: Progressing     Pt progressing;  No DC date - accepted [facility] - need to transfer MA prior to discharge

## 2021-07-09 NOTE — PROGRESS NOTES
Progress Note - Behavioral Health   Lucrecia Reilly 77 y o  female MRN: [id number]  Unit/Bed#: [facility] 201-01 Encounter: [id number]    Assessment/Plan   Principal Problem:    MDD (major depressive disorder), recurrent, severe, with psychosis ([address] )  Active Problems:    Essential hypertension    Chronic hand pain, right    Chronic low back pain    Osteoarthritis of lumbar spine with myelopathy    Vitamin D deficiency    CAD (coronary artery disease)      Subjective:Patient was seen today for continuation of care, records reviewed and  patient was discussed with the morning case review team       Patient seen today for psychiatric follow-up  She is bright and friendly upon approach  She is very much looking forward to discharge, and expresses some disappointment in hearing that she will not be leaving today  She understands that she needs to work on her Medicaid recertification prior to going to the personal care home  She continues to complain of back pain, and is tolerating her pain medication well  She denies depression, denies anxiety  She denies any auditory or visual hallucinations  She does not display or endorse any signs or symptoms of bentley or psychosis  She denies suicidal and homicidal ideation      Psychiatric Review of Systems:    Sleep: normal  Appetite: normal  Medication side effects: No   ROS: reports back pain, all other systems are negative    Vitals:  Vitals:    07/09/21 0731   BP: 153/67   Pulse: 72   Resp: 18   Temp: (!) 97 4 °F (36 3 °C)   SpO2: 94%       Mental Status Evaluation:    Appearance:  age appropriate, dressed appropriately   Behavior:  pleasant, cooperative   Speech:  normal rate, normal volume, normal pitch   Mood:  euthymic   Affect:  appropriate   Thought Process:  circumstantial   Associations: circumstantial associations   Thought Content:  no overt delusions   Perceptual Disturbances: none   Risk Potential: Suicidal ideation - None  Homicidal ideation - None  Potential for aggression - No   Sensorium:  oriented to person, place, time/date and situation   Memory:  recent and remote memory grossly intact   Consciousness:  alert and awake   Attention: attention span and concentration are age appropriate   Insight:  fair   Judgment: fair   Gait/Station: normal gait/station   Motor Activity: no abnormal movements     Laboratory results:    I have personally reviewed all pertinent laboratory/tests results  Most Recent Labs:   Lab Results   Component Value Date    WBC 5 70 07/06/2021    RBC 3 80 (L) 07/06/2021    HGB 11 8 (L) 07/06/2021    HCT 35 1 (L) 07/06/2021     07/06/2021    RDW 14 5 07/06/2021    NEUTROABS 2 80 07/06/2021    SODIUM 139 07/06/2021    K 4 2 07/06/2021     07/06/2021    CO2 28 07/06/2021    BUN 17 07/06/2021    CREATININE 0 81 07/06/2021    GLUC 84 07/06/2021    GLUF 84 07/06/2021    CALCIUM 8 7 07/06/2021    AST 17 07/06/2021    ALT 19 07/06/2021    ALKPHOS 138 07/06/2021    TP 6 0 (L) 07/06/2021    ALB 3 5 07/06/2021    TBILI 0 40 07/06/2021    NHE0HROUNSWH 1 838 06/13/2021       Progress Toward Goals:     Denies all psychiatric signs and symptoms  She is awaiting on Medicaid recertification to be excepted in to her personal care home  No changes will be made medications at this time      Recommended Treatment:     All current active medications have been reviewed  Encourage group therapy, milieu therapy and occupational therapy  Behavioral Health checks every 7 minutes  Discharge planning  Continue current medications:  Current Facility-Administered Medications   Medication Dose Route Frequency Provider Last Rate    acetaminophen  650 mg Oral Q4H PRN Irwin Picking, CRNP      acetaminophen  650 mg Oral Q4H PRN Irwin Picking, CRNP      aluminum-magnesium hydroxide-simethicone  30 mL Oral Q4H PRN Irwin Picking, CRNP      amLODIPine  5 mg Oral Daily Irwin Picking, CRNP      apixaban  5 mg Oral BID Irwin Picking, CRNP      [START ON 8/4/2021] cholecalciferol  1,000 Units Oral Daily Natalia Mensah MD      Diclofenac Sodium  2 g Topical 4x Daily PRN Doreen Dawkins PA-C      ergocalciferol  50,000 Units Oral Weekly Natalia Mensah MD      gabapentin  300 mg Oral TID Pedro Hu MD      hydrOXYzine HCL  25 mg Oral Q6H PRN Max 4/day Clarissa Arvind, CRNP      hydrOXYzine HCL  50 mg Oral Q6H PRN Clarissa Arvind, CRNP      isosorbide mononitrate  30 mg Oral Daily Clarissa Arvind, CRNP      lidocaine  3 patch Topical Daily Natalia Mensah MD      LORazepam  0 5 mg Oral Q8H PRN Clarissa Arvind, CRKAREN      melatonin  3 mg Oral HS Clarissa Arvind, CRNP      metoprolol tartrate  25 mg Oral Q12H [address] Clarissa Arvind, CRNP      OLANZapine  5 mg Intramuscular Q6H PRN Max 4/day Clarissa Arvind, CRNP      OLANZapine  10 mg Oral HS Pedro Hu MD      OLANZapine  2 5 mg Oral Q6H PRN Max 4/day Clarissa Arvind, CRNP      OLANZapine  5 mg Oral Q6H PRN Max 4/day Clarissa Arvind, CRNP      OLANZapine  5 mg Oral Daily Pedro Hu MD      ondansetron  4 mg Oral Q6H PRN Natalia Mensah MD      oxyCODONE  10 mg Oral Q4H PRN Doreen Dawkins PA-C      pantoprazole  40 mg Oral Early Morning Clarissa Arvind, CRNP      polyethylene glycol  17 g Oral Daily PRN Clarissa Arvind, CRNP      pravastatin  20 mg Oral Daily With [facility], CRNP      prazosin  2 mg Oral HS Clarissa Arvind, CRNP      risperiDONE  0 5 mg Oral Q6H PRN Max 4/day Clarissa Arvind, CRNP      senna-docusate sodium  1 tablet Oral Daily PRN Clarissa Arvind, CRNP      traZODone  50 mg Oral HS PRN Clarissa Arvind, CRNP      venlafaxine  225 mg Oral Daily Clarissa Arvind, CHINYERENP         Risks / Benefits of Treatment:     Risks, benefits, and possible side effects of medications explained to patient and patient verbalizes understanding and agreement for treatment  Counseling / Coordination of Care:     Patient's progress reviewed with nursing staff  Medications, treatment progress and treatment plan reviewed with patient    Supportive counseling provided to the patient            OJSE Jacobo

## 2021-07-09 NOTE — PLAN OF CARE
Problem: Alteration in Thoughts and Perception  Goal: Treatment Goal: Gain control of psychotic behaviors/thinking, reduce/eliminate presenting symptoms and demonstrate improved reality functioning upon discharge  Outcome: Progressing  Goal: Refrain from acting on delusional thinking/internal stimuli  Description: Interventions:  - Monitor patient closely, per order   - Utilize least restrictive measures   - Set reasonable limits, give positive feedback for acceptable   - Administer medications as ordered and monitor of potential side effects  Outcome: Progressing  Goal: Agree to be compliant with medication regime, as prescribed and report medication side effects  Description: Interventions:  - Offer appropriate PRN medication and supervise ingestion; conduct AIMS, as needed   Outcome: Progressing     Problem: Ineffective Coping  Goal: Cooperates with admission process  Description: Interventions:   - Complete admission process  Outcome: Progressing  Goal: Identifies healthy coping skills  Outcome: Progressing  Goal: Participates in unit activities  Description: Interventions:  - Provide therapeutic environment   - Provide required programming   - Redirect inappropriate behaviors   Outcome: Progressing  Goal: Patient/Family participate in treatment and DC plans  Description: Interventions:  - Provide therapeutic environment  Outcome: Progressing     Problem: Depression  Goal: Treatment Goal: Demonstrate behavioral control of depressive symptoms, verbalize feelings of improved mood/affect, and adopt new coping skills prior to discharge  Outcome: Progressing  Goal: Refrain from harming self  Description: Interventions:  - Monitor patient closely, per order   - Supervise medication ingestion, monitor effects and side effects   Outcome: Progressing  Goal: Refrain from isolation  Description: Interventions:  - Develop a trusting relationship   - Encourage socialization   Outcome: Progressing  Goal: Refrain from self-neglect  Outcome: Progressing  Goal: Complete daily ADLs, including personal hygiene independently, as able  Description: Interventions:  - Observe, teach, and assist patient with ADLS  -  Monitor and promote a balance of rest/activity, with adequate nutrition and elimination   Outcome: Progressing     Problem: Anxiety  Goal: Anxiety is at manageable level  Description: Interventions:  - Assess and monitor patient's anxiety level  - Monitor for signs and symptoms (heart palpitations, chest pain, shortness of breath, headaches, nausea, feeling jumpy, restlessness, irritable, apprehensive)  - Collaborate with interdisciplinary team and initiate plan and interventions as ordered  - [place] patient to unit/surroundings  - Explain treatment plan  - Encourage participation in care  - Encourage verbalization of concerns/fears  - Identify coping mechanisms  - Assist in developing anxiety-reducing skills  - Administer/offer alternative therapies  - Limit or eliminate stimulants  Outcome: Progressing     Problem: Nutrition/Hydration-ADULT  Goal: Nutrient/Hydration intake appropriate for improving, restoring or maintaining nutritional needs  Description: Monitor and assess patient's nutrition/hydration status for malnutrition  Collaborate with interdisciplinary team and initiate plan and interventions as ordered  Monitor patient's weight and dietary intake as ordered or per policy  Utilize nutrition screening tool and intervene as necessary  Determine patient's food preferences and provide high-protein, high-caloric foods as appropriate       INTERVENTIONS:  - Monitor oral intake, urinary output, labs, and treatment plans  - Assess nutrition and hydration status and recommend course of action  - Evaluate amount of meals eaten  - Assist patient with eating if necessary   - Allow adequate time for meals  - Recommend/ encourage appropriate diets, oral nutritional supplements, and vitamin/mineral supplements  - Order, calculate, and assess calorie counts as needed  - Recommend, monitor, and adjust tube feedings and TPN/PPN based on assessed needs  - Assess need for intravenous fluids  - Provide specific nutrition/hydration education as appropriate  - Include patient/family/caregiver in decisions related to nutrition  Outcome: Progressing

## 2021-07-10 PROCEDURE — 99232 SBSQ HOSP IP/OBS MODERATE 35: CPT | Performed by: PSYCHIATRY & NEUROLOGY

## 2021-07-10 RX ADMIN — OLANZAPINE 10 MG: 10 TABLET, FILM COATED ORAL at 21:49

## 2021-07-10 RX ADMIN — OXYCODONE HYDROCHLORIDE 10 MG: 10 TABLET ORAL at 14:15

## 2021-07-10 RX ADMIN — GABAPENTIN 300 MG: 300 CAPSULE ORAL at 08:32

## 2021-07-10 RX ADMIN — MELATONIN 3 MG: at 21:49

## 2021-07-10 RX ADMIN — GABAPENTIN 300 MG: 300 CAPSULE ORAL at 21:49

## 2021-07-10 RX ADMIN — AMLODIPINE BESYLATE 5 MG: 5 TABLET ORAL at 08:32

## 2021-07-10 RX ADMIN — ALUMINUM HYDROXIDE, MAGNESIUM HYDROXIDE, AND SIMETHICONE 30 ML: 200; 200; 20 SUSPENSION ORAL at 06:22

## 2021-07-10 RX ADMIN — PANTOPRAZOLE SODIUM 40 MG: 40 TABLET, DELAYED RELEASE ORAL at 06:07

## 2021-07-10 RX ADMIN — OXYCODONE HYDROCHLORIDE 10 MG: 10 TABLET ORAL at 10:13

## 2021-07-10 RX ADMIN — OLANZAPINE 5 MG: 5 TABLET, FILM COATED ORAL at 08:32

## 2021-07-10 RX ADMIN — VENLAFAXINE HYDROCHLORIDE 225 MG: 150 CAPSULE, EXTENDED RELEASE ORAL at 08:32

## 2021-07-10 RX ADMIN — APIXABAN 5 MG: 5 TABLET, FILM COATED ORAL at 08:32

## 2021-07-10 RX ADMIN — METOPROLOL TARTRATE 25 MG: 25 TABLET, FILM COATED ORAL at 21:50

## 2021-07-10 RX ADMIN — OXYCODONE HYDROCHLORIDE 10 MG: 10 TABLET ORAL at 01:55

## 2021-07-10 RX ADMIN — ISOSORBIDE MONONITRATE 30 MG: 30 TABLET, EXTENDED RELEASE ORAL at 08:32

## 2021-07-10 RX ADMIN — PRAVASTATIN SODIUM 20 MG: 20 TABLET ORAL at 16:12

## 2021-07-10 RX ADMIN — OXYCODONE HYDROCHLORIDE 10 MG: 10 TABLET ORAL at 06:07

## 2021-07-10 RX ADMIN — PHENYTOIN 2 MG: 125 SUSPENSION ORAL at 21:49

## 2021-07-10 RX ADMIN — LIDOCAINE 3 PATCH: 50 PATCH TOPICAL at 08:33

## 2021-07-10 RX ADMIN — OXYCODONE HYDROCHLORIDE 10 MG: 10 TABLET ORAL at 18:27

## 2021-07-10 RX ADMIN — METOPROLOL TARTRATE 25 MG: 25 TABLET, FILM COATED ORAL at 08:36

## 2021-07-10 RX ADMIN — GABAPENTIN 300 MG: 300 CAPSULE ORAL at 16:12

## 2021-07-10 RX ADMIN — APIXABAN 5 MG: 5 TABLET, FILM COATED ORAL at 17:08

## 2021-07-10 NOTE — NURSING NOTE
Patient requested and received PRN Oxycodone for c/o lower back pain with a rating of 7/10  Will monitor for medication effectiveness

## 2021-07-10 NOTE — PROGRESS NOTES
Progress Note - Matias Navas 77 y o  female MRN: [id number]    Unit/Bed#[facility] 201-01 Encounter: [id number]        Subjective:   Patient seen and examined at bedside after reviewing the chart and discussing the case with the caring staff  Patient examined at bedside  Patient has no acute complaints  Patient is going to a personal care home in [facility]  Patient will be required requiring all her medications  I reviewed and reconciled patient's problem list and medications  Physical Exam   Vitals: Blood pressure 126/60, pulse 72, temperature 98 4 °F (36 9 °C), temperature source Temporal, resp  rate 16, height 5' 3" (1 6 m), weight 101 kg (221 lb 12 5 oz), SpO2 96 %  ,Body mass index is 39 29 kg/m²  Constitutional: He appears well-developed  HEENT: PERR, EOMI, MMM  Cardiovascular: Normal rate and regular rhythm  Pulmonary/Chest: Effort normal and breath sounds normal    Abdomen: Soft, + BS, NT    Assessment/Plan:  Matias Navas is a(n) 77y o  year old female with MDD with psychotic symptoms    MEDICAL CLEARANCE  Patient is medically cleared for discharge  All scripts will be sent out for the patient once discharge is finalized to the relevant pharmacy      1  Cardiac with history of hypertension, dyslipidemia, CHF, atrial fibrillation, coronary artery disease status post pacemaker placement  Patient will be continued on metoprolol 25 mg b i d , amlodipine 5 mg daily, Imdur 30 mg daily, Pravachol 20 mg daily and Eliquis 5 mg 2 times daily  2  Neuropathy  Patient is on gabapentin 300 mg 3 times daily  3  GERD  Patient is on Protonix 40 mg daily  4  DJD/osteoarthritis  Patient is on oxycodone 10 mg severe pain along with Lidoderm patch for shoulder pain and lower back pain  Patient may use heating pad  5  Gait abnormality  PT OT consulted  6  Vitamin-D deficiency  Patient is on vitamin-D bolus doses for 8 weeks followed by vitamin D3 1000 units daily    7  Vitamin B12 deficiency  Patient is on monthly B12 injections  8  Acute right foot pain  Patient's x-ray of the right foot completed on 06/28/2021 showed no acute osseous abnormalities  The patient was discussed with Dr Katharine Sorto and he is in agreement with the above note

## 2021-07-10 NOTE — NURSING NOTE
Patient is awake at this time  She c/o severe lower back pain with a rating of 9/10 and requests PRN pain analgesia  Administered PRN Oxycodone as per order  Will monitor for medication effectiveness

## 2021-07-10 NOTE — NURSING NOTE
Pt up ad krish & gait is steady  Pt denies any depression & c/o anxiety 5/10  Pt denies any hallucinations, suicidal or homicidal ideations  Pt is cooperative & compliant with medications  Pt is pleasant & socializes with other patients  Pt medicated for generalized pain prn with Oxy-IR po as ordered by MD with moderate relief obtained  Q 7 min checks maintained to monitor pt's behavior & safety

## 2021-07-10 NOTE — NURSING NOTE
Patient was observed to be in the community this evening  She is pleasant and cooperative during staff interactions tonight  She is social with peers; can be intrusive at times  Speech loud  Endorses anxiety 5/10, denies depression, denies SI,  HI and hallucinations  Patient was medication compliant at ; lidoderm patches were removed when patient showered earlier in the day (per patient)  She c/o lower back pain with rating of 7/10 for which she received PRN Oxycodone  On reassessment, patient informs PRN was effective in reducing pain to 1/10; states she feels better  Will CTM via q7 minute safety checks

## 2021-07-10 NOTE — PROGRESS NOTES
Progress Note - Behavioral Health   Kely Ko 77 y o  female MRN: [id number]  Unit/Bed#: [address] 201-01 Encounter: [id number]    Assessment/Plan   Principal Problem:    MDD (major depressive disorder), recurrent, severe, with psychosis ([facility] [address] )  Active Problems:    Essential hypertension    Chronic hand pain, right    Chronic low back pain    Osteoarthritis of lumbar spine with myelopathy    Vitamin D deficiency    CAD (coronary artery disease)      Behavior over the last 24 hours:  unchanged  Sleep: normal  Appetite: normal  Medication side effects: No  ROS: no complaints    Mental Status Evaluation:  Appearance:  casually dressed   Behavior:  psychomotor retardation   Speech:  soft   Mood:  depressed   Affect:  constricted   Thought Process:  goal directed   Thought Content:  obsessions   Perceptual Disturbances: None   Risk Potential: Suicidal Ideations none  Homicidal Ideations none  Potential for Aggression No   Sensorium:  person, place and time/date   Memory:  recent and remote memory grossly intact   Consciousness:  awake    Attention: attention span and concentration were age appropriate   Insight:  limited   Judgment: limited   Gait/Station: slow   Motor Activity: no abnormal movements     Progress Toward Goals: Pt seen out in the day area,has nil new complaints tells me that the medications have been helpful  staff cont to report that she remains engaged in the unit milue and social with select peers on the unit   Pt cont to endorse an overall improvement in her mood and remains medication compliant with no issues   Staff report no behavioral issues  Pt is awaiting placement  Recommended Treatment:   1-Cont current treatment  2-Continue with group therapy, milieu therapy and occupational therapy  Risks, benefits and possible side effects of Medications:   Risks, benefits, and possible side effects of medications explained to patient and patient verbalizes understanding        Medications:   current meds:   Current Facility-Administered Medications   Medication Dose Route Frequency    acetaminophen (TYLENOL) tablet 650 mg  650 mg Oral Q4H PRN    acetaminophen (TYLENOL) tablet 650 mg  650 mg Oral Q4H PRN    aluminum-magnesium hydroxide-simethicone (MYLANTA) oral suspension 30 mL  30 mL Oral Q4H PRN    amLODIPine (NORVASC) tablet 5 mg  5 mg Oral Daily    apixaban (ELIQUIS) tablet 5 mg  5 mg Oral BID    [START ON 8/4/2021] cholecalciferol (VITAMIN D3) tablet 1,000 Units  1,000 Units Oral Daily    Diclofenac Sodium (VOLTAREN) 1 % topical gel 2 g  2 g Topical 4x Daily PRN    ergocalciferol (VITAMIN D2) capsule 50,000 Units  50,000 Units Oral Weekly    gabapentin (NEURONTIN) capsule 300 mg  300 mg Oral TID    hydrOXYzine HCL (ATARAX) tablet 25 mg  25 mg Oral Q6H PRN Max 4/day    hydrOXYzine HCL (ATARAX) tablet 50 mg  50 mg Oral Q6H PRN    isosorbide mononitrate (IMDUR) 24 hr tablet 30 mg  30 mg Oral Daily    lidocaine (LIDODERM) 5 % patch 3 patch  3 patch Topical Daily    LORazepam (ATIVAN) tablet 0 5 mg  0 5 mg Oral Q8H PRN    melatonin tablet 3 mg  3 mg Oral HS    metoprolol tartrate (LOPRESSOR) tablet 25 mg  25 mg Oral Q12H ALISA    OLANZapine (ZyPREXA) IM injection 5 mg  5 mg Intramuscular Q6H PRN Max 4/day    OLANZapine (ZyPREXA) tablet 10 mg  10 mg Oral HS    OLANZapine (ZyPREXA) tablet 2 5 mg  2 5 mg Oral Q6H PRN Max 4/day    OLANZapine (ZyPREXA) tablet 5 mg  5 mg Oral Q6H PRN Max 4/day    OLANZapine (ZyPREXA) tablet 5 mg  5 mg Oral Daily    ondansetron (ZOFRAN-ODT) dispersible tablet 4 mg  4 mg Oral Q6H PRN    oxyCODONE (ROXICODONE) immediate release tablet 10 mg  10 mg Oral Q4H PRN    pantoprazole (PROTONIX) EC tablet 40 mg  40 mg Oral Early Morning    polyethylene glycol (MIRALAX) packet 17 g  17 g Oral Daily PRN    pravastatin (PRAVACHOL) tablet 20 mg  20 mg Oral Daily With Dinner    prazosin (MINIPRESS) capsule 2 mg  2 mg Oral HS    risperiDONE (RisperDAL M-TAB) disintegrating tablet 0 5 mg  0 5 mg Oral Q6H PRN Max 4/day    senna-docusate sodium (SENOKOT S) 8 6-50 mg per tablet 1 tablet  1 tablet Oral Daily PRN    traZODone (DESYREL) tablet 50 mg  50 mg Oral HS PRN    venlafaxine (EFFEXOR-XR) 24 hr capsule 225 mg  225 mg Oral Daily     Labs: I have personally reviewed all pertinent laboratory/tests results  Last Laboratory Results with Depakote and/or Tegretol levels:   Lab Results   Component Value Date    WBC 5 70 07/06/2021    RBC 3 80 (L) 07/06/2021    HGB 11 8 (L) 07/06/2021    HCT 35 1 (L) 07/06/2021     07/06/2021    RDW 14 5 07/06/2021    NEUTROABS 2 80 07/06/2021    SODIUM 139 07/06/2021    K 4 2 07/06/2021     07/06/2021    CO2 28 07/06/2021    BUN 17 07/06/2021    CREATININE 0 81 07/06/2021    GLUC 84 07/06/2021    GLUF 84 07/06/2021    CALCIUM 8 7 07/06/2021    AST 17 07/06/2021    ALT 19 07/06/2021    ALKPHOS 138 07/06/2021    TP 6 0 (L) 07/06/2021    ALB 3 5 07/06/2021    TBILI 0 40 07/06/2021       Counseling / Coordination of Care  Total floor / unit time spent today 25 minutes  Greater than 50% of total time was spent with the patient and / or family counseling and / or coordination of care   A description of the counseling / coordination of care:

## 2021-07-10 NOTE — NURSING NOTE
Patient experienced broken sleep throughout the duration of the overnight hours  She ambulated to the nurses station multiple times to inquire when her next dose of PRN Oxycodone could be given  C/o lower back pain with a continued rating of 9/10  Dose given at 0155 was ineffective per patient  Will CTM  Q7 minute safety checks

## 2021-07-10 NOTE — NURSING NOTE
Patient c/o dyspepsia at this time and requests PRN medication  Administered PRN Mylanta as per order  Will monitor for medication effectiveness

## 2021-07-10 NOTE — NURSING NOTE
Pt continues to c/o lower back pain & taking pain med of Oxy-IR po Q 4 hrs prn as ordered by MD  Pt does nap for 1/12 - 2 hrs after taking pain med  Pt does receive pain relief initially for 3 hrs, but feels that she needs medication Q 4 hrs  Q 7 min checks maintained to monitor pt's behavior & safety  Pt requesting her back stimulator from home for back pain & will address this matter with Dr Jennings

## 2021-07-11 PROCEDURE — 99232 SBSQ HOSP IP/OBS MODERATE 35: CPT | Performed by: PSYCHIATRY & NEUROLOGY

## 2021-07-11 RX ORDER — LOPERAMIDE HYDROCHLORIDE 2 MG/1
2 CAPSULE ORAL 3 TIMES DAILY PRN
Status: DISCONTINUED | OUTPATIENT
Start: 2021-07-11 | End: 2021-07-22 | Stop reason: HOSPADM

## 2021-07-11 RX ADMIN — OXYCODONE HYDROCHLORIDE 10 MG: 10 TABLET ORAL at 08:55

## 2021-07-11 RX ADMIN — PANTOPRAZOLE SODIUM 40 MG: 40 TABLET, DELAYED RELEASE ORAL at 03:16

## 2021-07-11 RX ADMIN — METOPROLOL TARTRATE 25 MG: 25 TABLET, FILM COATED ORAL at 21:49

## 2021-07-11 RX ADMIN — ALUMINUM HYDROXIDE, MAGNESIUM HYDROXIDE, AND SIMETHICONE 30 ML: 200; 200; 20 SUSPENSION ORAL at 10:40

## 2021-07-11 RX ADMIN — AMLODIPINE BESYLATE 5 MG: 5 TABLET ORAL at 08:38

## 2021-07-11 RX ADMIN — APIXABAN 5 MG: 5 TABLET, FILM COATED ORAL at 08:38

## 2021-07-11 RX ADMIN — ONDANSETRON 4 MG: 4 TABLET, ORALLY DISINTEGRATING ORAL at 11:43

## 2021-07-11 RX ADMIN — VENLAFAXINE HYDROCHLORIDE 225 MG: 150 CAPSULE, EXTENDED RELEASE ORAL at 08:38

## 2021-07-11 RX ADMIN — OXYCODONE HYDROCHLORIDE 10 MG: 10 TABLET ORAL at 21:52

## 2021-07-11 RX ADMIN — GABAPENTIN 300 MG: 300 CAPSULE ORAL at 08:39

## 2021-07-11 RX ADMIN — OXYCODONE HYDROCHLORIDE 10 MG: 10 TABLET ORAL at 03:16

## 2021-07-11 RX ADMIN — ALUMINUM HYDROXIDE, MAGNESIUM HYDROXIDE, AND SIMETHICONE 30 ML: 200; 200; 20 SUSPENSION ORAL at 03:15

## 2021-07-11 RX ADMIN — OXYCODONE HYDROCHLORIDE 10 MG: 10 TABLET ORAL at 13:05

## 2021-07-11 RX ADMIN — OLANZAPINE 5 MG: 5 TABLET, FILM COATED ORAL at 08:39

## 2021-07-11 RX ADMIN — ISOSORBIDE MONONITRATE 30 MG: 30 TABLET, EXTENDED RELEASE ORAL at 08:39

## 2021-07-11 RX ADMIN — LIDOCAINE 3 PATCH: 50 PATCH TOPICAL at 08:40

## 2021-07-11 RX ADMIN — PHENYTOIN 2 MG: 125 SUSPENSION ORAL at 21:48

## 2021-07-11 RX ADMIN — GABAPENTIN 300 MG: 300 CAPSULE ORAL at 21:48

## 2021-07-11 RX ADMIN — METOPROLOL TARTRATE 25 MG: 25 TABLET, FILM COATED ORAL at 08:39

## 2021-07-11 RX ADMIN — OLANZAPINE 10 MG: 10 TABLET, FILM COATED ORAL at 21:49

## 2021-07-11 RX ADMIN — OXYCODONE HYDROCHLORIDE 10 MG: 10 TABLET ORAL at 17:36

## 2021-07-11 RX ADMIN — GABAPENTIN 300 MG: 300 CAPSULE ORAL at 16:39

## 2021-07-11 RX ADMIN — APIXABAN 5 MG: 5 TABLET, FILM COATED ORAL at 17:17

## 2021-07-11 RX ADMIN — MELATONIN 3 MG: at 21:48

## 2021-07-11 RX ADMIN — PRAVASTATIN SODIUM 20 MG: 20 TABLET ORAL at 16:39

## 2021-07-11 NOTE — NURSING NOTE
Pt medicated for c/o nausea & dry heaves @ 1805 with Zofran po as ordered by MD with relief obtained  Pt had large stool & feels better  Q 7 min checks maintained to monitor pt's behavior & safety

## 2021-07-11 NOTE — NURSING NOTE
Pt medicated for chronic lower back pain @ 0855 with Oxy-IR po as ordered by MD with relief obtained  Pt medicated for c/o excessive gas @ 1040 with Mylanta po with mild relief obtained  Pt frequently passing flatus  Pt states " I had a small BM this AM "  Pt c/o depression 5/10 & anxiety 5/10  Pt denies any hallucinations, suicidal or homicidal ideations  Pt up ad krish & gait is steady  Pt is cooperative & compliant with medications  Q 7 min checks maintained to monitor pt's behavior & safety  Pt is pleasant & socializes with other patients

## 2021-07-11 NOTE — NURSING NOTE
N [id number]     Pt found to be sleeping on most rounds this shift  Woke up mid shift at 0300 requesting PRN for severe pain and upset stomach prn administered  Q 7 min checks ongoing

## 2021-07-11 NOTE — NURSING NOTE
Pt found to be sleeping early this shift at approx 2000  Woke easily for HS medications  Requested prn analgesic for severe pain; informed frequency hadn't lapsed yet  Pt understanding; returned back to sleep   Q 7 min checks ongoing

## 2021-07-11 NOTE — PROGRESS NOTES
Progress Note - Ashley Moreno 77 y o  female MRN: [id number]    Unit/Bed#[facility] 201-01 Encounter: [id number]        Subjective:   Patient seen and examined at bedside after reviewing the chart and discussing the case with the caring staff  Patient examined at bedside  Patient has no acute complaints  Patient is going to a personal care home in [facility]  Patient will be requiring all her medications  I reviewed and reconciled patient's problem list and medications  Physical Exam   Vitals: Blood pressure 164/84, pulse 67, temperature 97 6 °F (36 4 °C), temperature source Temporal, resp  rate 18, height 5' 3" (1 6 m), weight 101 kg (221 lb 12 5 oz), SpO2 96 %  ,Body mass index is 39 29 kg/m²  Constitutional: He appears well-developed  HEENT: PERR, EOMI, MMM  Cardiovascular: Normal rate and regular rhythm  Pulmonary/Chest: Effort normal and breath sounds normal    Abdomen: Soft, + BS, NT    Assessment/Plan:  Ashley Moreno is a(n) 77y o  year old female with MDD with psychotic symptoms    MEDICAL CLEARANCE  Patient is medically cleared for discharge  All scripts will be sent out for the patient once discharge is finalized to the relevant pharmacy      1  Cardiac with history of hypertension, dyslipidemia, CHF, atrial fibrillation, coronary artery disease status post pacemaker placement  Patient will be continued on metoprolol 25 mg b i d , amlodipine 5 mg daily, Imdur 30 mg daily, Pravachol 20 mg daily and Eliquis 5 mg 2 times daily  2  Neuropathy  Patient is on gabapentin 300 mg 3 times daily  3  GERD  Patient is on Protonix 40 mg daily  4  DJD/osteoarthritis  Patient is on oxycodone 10 mg severe pain along with Lidoderm patch for shoulder pain and lower back pain  Patient may use heating pad  5  Gait abnormality  PT OT consulted  6  Vitamin-D deficiency  Patient is on vitamin-D bolus doses for 8 weeks followed by vitamin D3 1000 units daily  7  Vitamin B12 deficiency  Patient is on monthly B12 injections  8  Acute right foot pain  Patient's x-ray of the right foot completed on 06/28/2021 showed no acute osseous abnormalities  The patient was discussed with Dr Ronak Amezcua and he is in agreement with the above note

## 2021-07-11 NOTE — NURSING NOTE
D 7a to 11a    Pt  present in the milieu; affect brightens with interaction  Reports anxiety related to  uncertainty of discharge  Denies SI or HI  Requested/recieved severe analgesic  Found to be sleeping on reassment  No acute behavioral  Q 7 min checks  ongoing

## 2021-07-11 NOTE — PROGRESS NOTES
Progress Note - Behavioral Health   Magaly Abernathy 77 y o  female MRN: [id number]  Unit/Bed#: [facility] 201-01 Encounter: [id number]    Assessment/Plan   Principal Problem:    MDD (major depressive disorder), recurrent, severe, with psychosis ([address] )  Active Problems:    Essential hypertension    Chronic hand pain, right    Chronic low back pain    Osteoarthritis of lumbar spine with myelopathy    Vitamin D deficiency    CAD (coronary artery disease)      Behavior over the last 24 hours:  unchanged  Sleep: normal  Appetite: normal  Medication side effects: No  ROS: no complaints    Mental Status Evaluation:  Appearance:  casually dressed   Behavior:  psychomotor retardation   Speech:  soft   Mood:  depressed   Affect:  constricted   Thought Process:  goal directed   Thought Content:  obsessions   Perceptual Disturbances: None   Risk Potential: Suicidal Ideations none  Homicidal Ideations none  Potential for Aggression No   Sensorium:  person, place and time/date   Memory:  recent and remote memory grossly intact   Consciousness:  awake    Attention: attention span and concentration were age appropriate   Insight:  limited   Judgment: limited   Gait/Station: slow   Motor Activity: no abnormal movements     Progress Toward Goals: Pt seen lying in bed,she reports having diarrhea with 2 loose bowel motions this morning  She however remains engaged in the unit milue and social with select peers on the unit   Pt cont to endorse an overall improvement in her mood and remains medication compliant with no issues   Staff report no behavioral issues  Pt is awaiting placement  Recommended Treatment:   1-Imodium 2mg TID prn for diarrhea  2-Cont other treatment  3-Continue with group therapy, milieu therapy and occupational therapy  Risks, benefits and possible side effects of Medications:   Risks, benefits, and possible side effects of medications explained to patient and patient verbalizes understanding        Medications: current meds:   Current Facility-Administered Medications   Medication Dose Route Frequency    acetaminophen (TYLENOL) tablet 650 mg  650 mg Oral Q4H PRN    acetaminophen (TYLENOL) tablet 650 mg  650 mg Oral Q4H PRN    aluminum-magnesium hydroxide-simethicone (MYLANTA) oral suspension 30 mL  30 mL Oral Q4H PRN    amLODIPine (NORVASC) tablet 5 mg  5 mg Oral Daily    apixaban (ELIQUIS) tablet 5 mg  5 mg Oral BID    [START ON 8/4/2021] cholecalciferol (VITAMIN D3) tablet 1,000 Units  1,000 Units Oral Daily    Diclofenac Sodium (VOLTAREN) 1 % topical gel 2 g  2 g Topical 4x Daily PRN    ergocalciferol (VITAMIN D2) capsule 50,000 Units  50,000 Units Oral Weekly    gabapentin (NEURONTIN) capsule 300 mg  300 mg Oral TID    hydrOXYzine HCL (ATARAX) tablet 25 mg  25 mg Oral Q6H PRN Max 4/day    hydrOXYzine HCL (ATARAX) tablet 50 mg  50 mg Oral Q6H PRN    isosorbide mononitrate (IMDUR) 24 hr tablet 30 mg  30 mg Oral Daily    lidocaine (LIDODERM) 5 % patch 3 patch  3 patch Topical Daily    loperamide (IMODIUM) capsule 2 mg  2 mg Oral TID PRN    LORazepam (ATIVAN) tablet 0 5 mg  0 5 mg Oral Q8H PRN    melatonin tablet 3 mg  3 mg Oral HS    metoprolol tartrate (LOPRESSOR) tablet 25 mg  25 mg Oral Q12H ALISA    OLANZapine (ZyPREXA) IM injection 5 mg  5 mg Intramuscular Q6H PRN Max 4/day    OLANZapine (ZyPREXA) tablet 10 mg  10 mg Oral HS    OLANZapine (ZyPREXA) tablet 2 5 mg  2 5 mg Oral Q6H PRN Max 4/day    OLANZapine (ZyPREXA) tablet 5 mg  5 mg Oral Q6H PRN Max 4/day    OLANZapine (ZyPREXA) tablet 5 mg  5 mg Oral Daily    ondansetron (ZOFRAN-ODT) dispersible tablet 4 mg  4 mg Oral Q6H PRN    oxyCODONE (ROXICODONE) immediate release tablet 10 mg  10 mg Oral Q4H PRN    pantoprazole (PROTONIX) EC tablet 40 mg  40 mg Oral Early Morning    polyethylene glycol (MIRALAX) packet 17 g  17 g Oral Daily PRN    pravastatin (PRAVACHOL) tablet 20 mg  20 mg Oral Daily With Dinner    prazosin (MINIPRESS) capsule 2 mg  2 mg Oral HS    risperiDONE (RisperDAL M-TAB) disintegrating tablet 0 5 mg  0 5 mg Oral Q6H PRN Max 4/day    senna-docusate sodium (SENOKOT S) 8 6-50 mg per tablet 1 tablet  1 tablet Oral Daily PRN    traZODone (DESYREL) tablet 50 mg  50 mg Oral HS PRN    venlafaxine (EFFEXOR-XR) 24 hr capsule 225 mg  225 mg Oral Daily     Labs: I have personally reviewed all pertinent laboratory/tests results  Last Laboratory Results with Depakote and/or Tegretol levels:   Lab Results   Component Value Date    WBC 5 70 07/06/2021    RBC 3 80 (L) 07/06/2021    HGB 11 8 (L) 07/06/2021    HCT 35 1 (L) 07/06/2021     07/06/2021    RDW 14 5 07/06/2021    NEUTROABS 2 80 07/06/2021    SODIUM 139 07/06/2021    K 4 2 07/06/2021     07/06/2021    CO2 28 07/06/2021    BUN 17 07/06/2021    CREATININE 0 81 07/06/2021    GLUC 84 07/06/2021    GLUF 84 07/06/2021    CALCIUM 8 7 07/06/2021    AST 17 07/06/2021    ALT 19 07/06/2021    ALKPHOS 138 07/06/2021    TP 6 0 (L) 07/06/2021    ALB 3 5 07/06/2021    TBILI 0 40 07/06/2021       Counseling / Coordination of Care  Total floor / unit time spent today 25 minutes  Greater than 50% of total time was spent with the patient and / or family counseling and / or coordination of care   A description of the counseling / coordination of care:

## 2021-07-12 PROCEDURE — 97116 GAIT TRAINING THERAPY: CPT

## 2021-07-12 PROCEDURE — 99232 SBSQ HOSP IP/OBS MODERATE 35: CPT | Performed by: NURSE PRACTITIONER

## 2021-07-12 RX ADMIN — PHENYTOIN 2 MG: 125 SUSPENSION ORAL at 20:50

## 2021-07-12 RX ADMIN — METOPROLOL TARTRATE 25 MG: 25 TABLET, FILM COATED ORAL at 08:30

## 2021-07-12 RX ADMIN — GABAPENTIN 300 MG: 300 CAPSULE ORAL at 20:50

## 2021-07-12 RX ADMIN — LORAZEPAM 0.5 MG: 0.5 TABLET ORAL at 12:14

## 2021-07-12 RX ADMIN — ACETAMINOPHEN 650 MG: 325 TABLET ORAL at 12:11

## 2021-07-12 RX ADMIN — PRAVASTATIN SODIUM 20 MG: 20 TABLET ORAL at 15:37

## 2021-07-12 RX ADMIN — OLANZAPINE 10 MG: 10 TABLET, FILM COATED ORAL at 20:50

## 2021-07-12 RX ADMIN — PANTOPRAZOLE SODIUM 40 MG: 40 TABLET, DELAYED RELEASE ORAL at 06:03

## 2021-07-12 RX ADMIN — APIXABAN 5 MG: 5 TABLET, FILM COATED ORAL at 18:07

## 2021-07-12 RX ADMIN — HYDROXYZINE HYDROCHLORIDE 25 MG: 25 TABLET, FILM COATED ORAL at 16:25

## 2021-07-12 RX ADMIN — AMLODIPINE BESYLATE 5 MG: 5 TABLET ORAL at 08:29

## 2021-07-12 RX ADMIN — OLANZAPINE 5 MG: 5 TABLET, FILM COATED ORAL at 16:25

## 2021-07-12 RX ADMIN — VENLAFAXINE HYDROCHLORIDE 225 MG: 150 CAPSULE, EXTENDED RELEASE ORAL at 08:27

## 2021-07-12 RX ADMIN — GABAPENTIN 300 MG: 300 CAPSULE ORAL at 08:30

## 2021-07-12 RX ADMIN — OLANZAPINE 5 MG: 5 TABLET, FILM COATED ORAL at 08:30

## 2021-07-12 RX ADMIN — LIDOCAINE 3 PATCH: 50 PATCH TOPICAL at 08:26

## 2021-07-12 RX ADMIN — ISOSORBIDE MONONITRATE 30 MG: 30 TABLET, EXTENDED RELEASE ORAL at 08:27

## 2021-07-12 RX ADMIN — MELATONIN 3 MG: at 20:50

## 2021-07-12 RX ADMIN — OXYCODONE HYDROCHLORIDE 10 MG: 10 TABLET ORAL at 10:13

## 2021-07-12 RX ADMIN — METOPROLOL TARTRATE 25 MG: 25 TABLET, FILM COATED ORAL at 20:50

## 2021-07-12 RX ADMIN — OXYCODONE HYDROCHLORIDE 10 MG: 10 TABLET ORAL at 06:03

## 2021-07-12 RX ADMIN — GABAPENTIN 300 MG: 300 CAPSULE ORAL at 15:36

## 2021-07-12 RX ADMIN — APIXABAN 5 MG: 5 TABLET, FILM COATED ORAL at 08:27

## 2021-07-12 NOTE — SOCIAL WORK
SW sent email to [employer] to provide update on MA process and request confirmation he will hold pt bed - awaiting response

## 2021-07-12 NOTE — PROGRESS NOTES
Progress Note - Dana Pedro 77 y o  female MRN: [id number]    Unit/Bed#[facility] 201-01 Encounter: [id number]        Subjective:   Patient seen and examined at bedside after reviewing the chart and discussing the case with the caring staff  Patient examined at bedside  Patient has no acute complaints  Patient is going to a personal care home in [facility]  Patient will be requiring all her medications  I reviewed and reconciled patient's problem list and medications  Physical Exam   Vitals: Blood pressure 129/60, pulse 76, temperature (!) 96 6 °F (35 9 °C), temperature source Temporal, resp  rate 16, height 5' 3" (1 6 m), weight 101 kg (221 lb 12 5 oz), SpO2 95 %  ,Body mass index is 39 29 kg/m²  Constitutional: He appears well-developed  HEENT: PERR, EOMI, MMM  Cardiovascular: Normal rate and regular rhythm  Pulmonary/Chest: Effort normal and breath sounds normal    Abdomen: Soft, + BS, NT    Assessment/Plan:  Dana Pedro is a(n) 77y o  year old female with MDD with psychotic symptoms    MEDICAL CLEARANCE  Patient is medically cleared for discharge  All scripts will be sent out for the patient once discharge is finalized to the relevant pharmacy      1  Cardiac with history of hypertension, dyslipidemia, CHF, atrial fibrillation, coronary artery disease status post pacemaker placement  Patient will be continued on metoprolol 25 mg b i d , amlodipine 5 mg daily, Imdur 30 mg daily, Pravachol 20 mg daily and Eliquis 5 mg 2 times daily  2  Neuropathy  Patient is on gabapentin 300 mg 3 times daily  3  GERD  Patient is on Protonix 40 mg daily  4  DJD/osteoarthritis  Patient is on oxycodone 10 mg severe pain along with Lidoderm patch for shoulder pain and lower back pain  Patient may use heating pad  5  Gait abnormality  PT OT consulted  6  Vitamin-D deficiency  Patient is on vitamin-D bolus doses for 8 weeks followed by vitamin D3 1000 units daily    7  Vitamin B12 deficiency  Patient is on monthly B12 injections  8  Acute right foot pain  Patient's x-ray of the right foot completed on 06/28/2021 showed no acute osseous abnormalities

## 2021-07-12 NOTE — NURSING NOTE
[id number]     Pt present in the milieu; social with peers  Withdrew to room after making a phone call noted to be sobbing  When asked if she wanted to talk about it patient stated she's upset because she hasn't been able to get ahold of her daughter> Q 7 min checks ongoing

## 2021-07-12 NOTE — PLAN OF CARE
Problem: DISCHARGE PLANNING - CARE MANAGEMENT  Goal: Discharge to post-acute care or home with appropriate resources  Description: INTERVENTIONS:  - Conduct assessment to determine patient/family and health care team treatment goals, and need for post-acute services based on payer coverage, community resources, and patient preferences, and barriers to discharge  - Address psychosocial, clinical, and financial barriers to discharge as identified in assessment in conjunction with the patient/family and health care team  - Arrange appropriate level of post-acute services according to patients   needs and preference and payer coverage in collaboration with the physician and health care team  - Communicate with and update the patient/family, physician, and health care team regarding progress on the discharge plan  - Arrange appropriate transportation to post-acute venues  Outcome: Progressing     Pt progressing; awaiting MA recert for DC to [place] [address]

## 2021-07-12 NOTE — SOCIAL WORK
CARTER received voicemail from [facility] office [phone]; SW attempted return call - message left requesting appointment time to discuss pt case - awaiting response

## 2021-07-12 NOTE — PLAN OF CARE
Problem: PHYSICAL THERAPY ADULT  Goal: Performs mobility at highest level of function for planned discharge setting  See evaluation for individualized goals  Description: Treatment/Interventions: Functional transfer training, Elevations, Therapeutic exercise, Endurance training, LE strengthening/ROM, Patient/family training, Gait training, Spoke to nursing  Equipment Recommended:  (TBD pending progress)       See flowsheet documentation for full assessment, interventions and recommendations  Outcome: Completed  Note: Prognosis: Good  Problem List: Decreased strength, Decreased endurance, Impaired balance, Decreased mobility, Decreased safety awareness  Assessment: Pt seen for PT treatment session this date with interventions consisting of gait training w/ emphasis on improving pt's ability to ambulate level surfaces x > 150 ft independently with no AD and therapeutic activity consisting of training: sit<>stand transfers  Pt agreeable to PT treatment session upon arrival, pt found seated OOB in chair, in no apparent distress, A&O x 4 and responsive  In comparison to previous session, pt with significant improvements in tolerance to activity and quality of mobility  No overt LOB noted with pt's ambulation at this time  Post session: all needs in reach and RN notified of session findings/recommendations  From PT/mobility standpoint, pt appears to be functioning close to or at mobility baseline, therefore no further immediate skilled PT needs are warranted at this time  Pt currently performing all phases of functional mobility at independent level without need for AD  Recommend pt continue to mobilize ad krish while here  Recommend pt return to previous living environment once medically cleared  Will sign off, D/C PT  Please reconsult if further immediate skilled PT needs are warranted    Barriers to Discharge: None        PT Discharge Recommendation: No rehabilitation needs     PT - OK to Discharge: Yes (when medically cleared)    See flowsheet documentation for full assessment

## 2021-07-12 NOTE — NURSING NOTE
[phone]  Pt found to be resting quietly on most of authors rounds  No acute behavioral issues noted  Up at 0600 to request/ receive prn analgesic  Q 7 min checks maintained  Fall protocol in place

## 2021-07-12 NOTE — SOCIAL WORK
SW received phone call from Mrs Coker Meigs - [phone]; scheduled phone meeting with patient for 2pm today

## 2021-07-12 NOTE — SOCIAL WORK
SW met with patient in pt room; pt demonstrated and expressed irritability with another peer on unit "she won't stop yelling" - SW provided support as needed and offered to close room door - pt declined; Pt denies SI/HI/AH/VH; pt denies dep but endorses anxiety "because of this (pointing to wall, referring to peer); pt inquired "am I getting out of here today or what?!" CARTER explained SW is still waiting to hear from [place] representative - another message was left this morning; pt states "this is ridiculous, it's going to take 5 minutes by phone to re-certify me  Why is this taking so long?" SW provided support as appropriate;  No additional questions or concerns for SW at this time; CARTER will continue to meet with patient as needed for tx and dc planning    Upon return to  office, CARTER placed another call to [place] office - left message on voicemail - awaiting response

## 2021-07-12 NOTE — NURSING NOTE
Pt up ad krish & gait is steady  Pt denies any depression & c/o anxiety 7/10  Pt denies any hallucinations, suicidal or homicidal ideations  Pt is pleasant & cooperative  Pt is compliant with medications  Q 7 min checks maintained to monitor pt's behavior & safety  Pt does socialize with other patients

## 2021-07-12 NOTE — PROGRESS NOTES
Progress Note - Behavioral Health   Johnie Chaney 77 y o  female MRN: [id number]  Unit/Bed#: Lia Hardy 201-01 Encounter: [id number]    Assessment/Plan   Principal Problem:    MDD (major depressive disorder), recurrent, severe, with psychosis ([facility] [address] )  Active Problems:    Essential hypertension    Chronic hand pain, right    Chronic low back pain    Osteoarthritis of lumbar spine with myelopathy    Vitamin D deficiency    CAD (coronary artery disease)      Behavior over the last 24 hours:  unchanged  Sleep: normal  Appetite: normal  Medication side effects: No  ROS: no complaints and All other systems negative for acute change     Cindy Gustafson was seen today for psychiatric follow-up  She continues to endorse anxiety related to discharge planning and states I'm still stuck here    She also said that she has been unable to get a hold of her friend for the past 3 days which also makes me anxious    Redirection was effective  Reports her night was terrible last night, but did not elaborate further  According to nursing staff, patient slept throughout the nigh and remains compliant with medications and meals  Cindy Gustafson is often visible in the milieu and social with peers  She denies any AVH/SI/HI/nightmares      Mental Status Evaluation:  Appearance:  age appropriate and casually dressed   Behavior:  Cooperative   Speech:  normal pitch and normal volume   Mood:  anxious   Affect:  blunted   Thought Process:  goal directed   Thought Content:  No overt delusions   Perceptual Disturbances: None   Risk Potential: Suicidal Ideations none  Homicidal Ideations none  Potential for Aggression No   Sensorium:  person, place, time/date and situation   Memory:  recent and remote memory grossly intact   Consciousness:  alert and awake    Attention: attention span appeared shorter than expected for age   Insight:  limited   Judgment: limited   Gait/Station: normal gait/station and normal balance   Motor Activity: no abnormal movements Progress Toward Goals:  No change  Patient continues to experience intermittent anxiety circumstantial to discharge planning  Will continue with current psychotropic regimen; patient tolerating well  Awaiting MA recertification to go to [facility]  No discharge date at this time  Recommended Treatment: Continue with group therapy, milieu therapy and occupational therapy  Risks, benefits and possible side effects of Medications:   Risks, benefits, and possible side effects of medications explained to patient and patient verbalizes understanding        Medications:   all current active meds have been reviewed, continue current psychiatric medications and current meds:   Current Facility-Administered Medications   Medication Dose Route Frequency    acetaminophen (TYLENOL) tablet 650 mg  650 mg Oral Q4H PRN    acetaminophen (TYLENOL) tablet 650 mg  650 mg Oral Q4H PRN    aluminum-magnesium hydroxide-simethicone (MYLANTA) oral suspension 30 mL  30 mL Oral Q4H PRN    amLODIPine (NORVASC) tablet 5 mg  5 mg Oral Daily    apixaban (ELIQUIS) tablet 5 mg  5 mg Oral BID    [START ON 8/4/2021] cholecalciferol (VITAMIN D3) tablet 1,000 Units  1,000 Units Oral Daily    Diclofenac Sodium (VOLTAREN) 1 % topical gel 2 g  2 g Topical 4x Daily PRN    ergocalciferol (VITAMIN D2) capsule 50,000 Units  50,000 Units Oral Weekly    gabapentin (NEURONTIN) capsule 300 mg  300 mg Oral TID    hydrOXYzine HCL (ATARAX) tablet 25 mg  25 mg Oral Q6H PRN Max 4/day    hydrOXYzine HCL (ATARAX) tablet 50 mg  50 mg Oral Q6H PRN    isosorbide mononitrate (IMDUR) 24 hr tablet 30 mg  30 mg Oral Daily    lidocaine (LIDODERM) 5 % patch 3 patch  3 patch Topical Daily    loperamide (IMODIUM) capsule 2 mg  2 mg Oral TID PRN    LORazepam (ATIVAN) tablet 0 5 mg  0 5 mg Oral Q8H PRN    melatonin tablet 3 mg  3 mg Oral HS    metoprolol tartrate (LOPRESSOR) tablet 25 mg  25 mg Oral Q12H [facility]    OLANZapine (ZyPREXA) IM injection 5 mg  5 mg Intramuscular Q6H PRN Max 4/day    OLANZapine (ZyPREXA) tablet 10 mg  10 mg Oral HS    OLANZapine (ZyPREXA) tablet 2 5 mg  2 5 mg Oral Q6H PRN Max 4/day    OLANZapine (ZyPREXA) tablet 5 mg  5 mg Oral Q6H PRN Max 4/day    OLANZapine (ZyPREXA) tablet 5 mg  5 mg Oral Daily    ondansetron (ZOFRAN-ODT) dispersible tablet 4 mg  4 mg Oral Q6H PRN    oxyCODONE (ROXICODONE) immediate release tablet 10 mg  10 mg Oral Q4H PRN    pantoprazole (PROTONIX) EC tablet 40 mg  40 mg Oral Early Morning    polyethylene glycol (MIRALAX) packet 17 g  17 g Oral Daily PRN    pravastatin (PRAVACHOL) tablet 20 mg  20 mg Oral Daily With Dinner    prazosin (MINIPRESS) capsule 2 mg  2 mg Oral HS    risperiDONE (RisperDAL M-TAB) disintegrating tablet 0 5 mg  0 5 mg Oral Q6H PRN Max 4/day    senna-docusate sodium (SENOKOT S) 8 6-50 mg per tablet 1 tablet  1 tablet Oral Daily PRN    traZODone (DESYREL) tablet 50 mg  50 mg Oral HS PRN    venlafaxine (EFFEXOR-XR) 24 hr capsule 225 mg  225 mg Oral Daily      Labs: I have personally reviewed all pertinent laboratory/tests results  CBC:   Lab Results   Component Value Date    WBC 5 70 07/06/2021    RBC 3 80 (L) 07/06/2021    HGB 11 8 (L) 07/06/2021    HCT 35 1 (L) 07/06/2021    MCV 92 07/06/2021     07/06/2021    MCH 31 0 07/06/2021    MCHC 33 5 07/06/2021    RDW 14 5 07/06/2021    MPV 8 4 (L) 07/06/2021    NEUTROABS 2 80 07/06/2021     CMP:   Lab Results   Component Value Date    SODIUM 139 07/06/2021    K 4 2 07/06/2021     07/06/2021    CO2 28 07/06/2021    AGAP 5 07/06/2021    BUN 17 07/06/2021    CREATININE 0 81 07/06/2021    GLUC 84 07/06/2021    GLUF 84 07/06/2021    CALCIUM 8 7 07/06/2021    AST 17 07/06/2021    ALT 19 07/06/2021    ALKPHOS 138 07/06/2021    TP 6 0 (L) 07/06/2021    ALB 3 5 07/06/2021    TBILI 0 40 07/06/2021    EGFR 75 07/06/2021     Counseling / Coordination of Care  Total floor / unit time spent today 25 minutes   Greater than 50% of total time was spent with the patient and / or family counseling and / or coordination of care

## 2021-07-12 NOTE — PROGRESS NOTES
07/12/21    Team Meeting   Meeting Type Daily Rounds   Team Members Present   Team Members Present Physician;Nurse;   Physician Team Member Dr Joanna Oates MD, JOSE Townsend   Nursing Team Member Sudeep Garner RN   Care Management Team Member MS Renee, [facility]   OT Team Member Dandy Hawthorne, [place]   Patient/Family Present   Patient Present No   Patient's Family Present No   Awaiting MA recertification to go to [facility]  Tearful, delusional at times

## 2021-07-12 NOTE — NURSING NOTE
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

## 2021-07-12 NOTE — PHYSICAL THERAPY NOTE
Physical Therapy Treatment Note       07/12/21 1314   PT Last Visit   PT Visit Date 07/12/21   Note Type   Note Type Treatment   Pain Assessment   Pain Assessment Tool 0-10   Pain Score 8   Pain Location/Orientation Location: Generalized   Pain Onset/Description Onset: Ongoing;Frequency: Constant/Continuous   Patient's Stated Pain Goal No pain   Hospital Pain Intervention(s) Emotional support; Rest   Restrictions/Precautions   Weight Bearing Precautions Per Order No   Other Precautions Fall Risk;Pain   General   Chart Reviewed Yes   Response to Previous Treatment Patient with no complaints from previous session  Family/Caregiver Present No   Cognition   Overall Cognitive Status WFL   Arousal/Participation Alert; Responsive; Cooperative   Attention Attends with cues to redirect   Orientation Level Oriented X4   Memory Decreased recall of precautions   Following Commands Follows one step commands without difficulty   Comments pt agreeable to PT session   Subjective   Subjective "I am waiting to talk to someone"   Bed Mobility   Additional Comments DNT bed mobility: pt seated OOB upon arrival on telephone   Ambulation/Elevation   Gait pattern WNL   Gait Assistance 7  Independent   Assistive Device None   Distance pt ad krish on unit, > 150 ft   Stair Management Assistance Not tested   Balance   Static Sitting Normal   Dynamic Sitting Normal   Static Standing Normal   Dynamic Standing Good   Ambulatory Good   Endurance Deficit   Endurance Deficit No   Activity Tolerance   Activity Tolerance Patient tolerated treatment well   Nurse Made Aware Yes, RN made aware of outcomes/recs   Assessment   Prognosis Good   Assessment Pt seen for PT treatment session this date with interventions consisting of gait training w/ emphasis on improving pt's ability to ambulate level surfaces x > 150 ft independently with no AD and therapeutic activity consisting of training: sit<>stand transfers   Pt agreeable to PT treatment session upon arrival, pt found seated OOB in chair, in no apparent distress, A&O x 4 and responsive  In comparison to previous session, pt with significant improvements in tolerance to activity and quality of mobility  No overt LOB noted with pt's ambulation at this time  Post session: all needs in reach and RN notified of session findings/recommendations  From PT/mobility standpoint, pt appears to be functioning close to or at mobility baseline, therefore no further immediate skilled PT needs are warranted at this time  Pt currently performing all phases of functional mobility at independent level without need for AD  Recommend pt continue to mobilize ad krish while here  Recommend pt return to previous living environment once medically cleared  Will sign off, D/C PT  Please reconsult if further immediate skilled PT needs are warranted  Barriers to Discharge None   Goals   Patient Goals "to return home"   Short Term Goal #1 goals achieved, pt is independent with mobility and transfers at this time s AD   PT Treatment Day 2   Plan   Treatment/Interventions Spoke to nursing   Progress Discontinue PT   PT Frequency   (D/c PT )   Recommendation   PT Discharge Recommendation No rehabilitation needs   Equipment Recommended   (none at this time)   PT - OK to Discharge Yes  (when medically cleared)   Additional Comments Pt's raw score on the [address] inpatient short form is 23, standardized score is 50 88  Patients at this level are likely to benefit from DC to home with no services, however, please refer to therapist recommendation for safe DC planning     AM-PAC Basic Mobility Inpatient   Turning in Bed Without Bedrails 4   Lying on Back to Sitting on Edge of Flat Bed 4   Moving Bed to Chair 4   Standing Up From Chair 4   Walk in Room 4   Climb 3-5 Stairs 3   Basic Mobility Inpatient Raw Score 23   Basic Mobility Standardized Score 50 88       Noemi Campo PT    Time of PT treatment session: 7364-1869

## 2021-07-13 PROCEDURE — 99232 SBSQ HOSP IP/OBS MODERATE 35: CPT | Performed by: PSYCHIATRY & NEUROLOGY

## 2021-07-13 RX ADMIN — PHENYTOIN 2 MG: 125 SUSPENSION ORAL at 21:19

## 2021-07-13 RX ADMIN — PRAVASTATIN SODIUM 20 MG: 20 TABLET ORAL at 17:07

## 2021-07-13 RX ADMIN — GABAPENTIN 300 MG: 300 CAPSULE ORAL at 14:50

## 2021-07-13 RX ADMIN — OXYCODONE HYDROCHLORIDE 10 MG: 10 TABLET ORAL at 19:56

## 2021-07-13 RX ADMIN — ISOSORBIDE MONONITRATE 30 MG: 30 TABLET, EXTENDED RELEASE ORAL at 08:36

## 2021-07-13 RX ADMIN — METOPROLOL TARTRATE 25 MG: 25 TABLET, FILM COATED ORAL at 21:19

## 2021-07-13 RX ADMIN — OLANZAPINE 5 MG: 5 TABLET, FILM COATED ORAL at 08:36

## 2021-07-13 RX ADMIN — GABAPENTIN 300 MG: 300 CAPSULE ORAL at 21:19

## 2021-07-13 RX ADMIN — PANTOPRAZOLE SODIUM 40 MG: 40 TABLET, DELAYED RELEASE ORAL at 05:57

## 2021-07-13 RX ADMIN — MELATONIN 3 MG: at 21:20

## 2021-07-13 RX ADMIN — APIXABAN 5 MG: 5 TABLET, FILM COATED ORAL at 17:07

## 2021-07-13 RX ADMIN — GABAPENTIN 300 MG: 300 CAPSULE ORAL at 08:36

## 2021-07-13 RX ADMIN — LIDOCAINE 3 PATCH: 50 PATCH TOPICAL at 08:35

## 2021-07-13 RX ADMIN — OXYCODONE HYDROCHLORIDE 10 MG: 10 TABLET ORAL at 14:49

## 2021-07-13 RX ADMIN — AMLODIPINE BESYLATE 5 MG: 5 TABLET ORAL at 08:36

## 2021-07-13 RX ADMIN — METOPROLOL TARTRATE 25 MG: 25 TABLET, FILM COATED ORAL at 08:36

## 2021-07-13 RX ADMIN — APIXABAN 5 MG: 5 TABLET, FILM COATED ORAL at 08:36

## 2021-07-13 RX ADMIN — VENLAFAXINE HYDROCHLORIDE 225 MG: 150 CAPSULE, EXTENDED RELEASE ORAL at 08:35

## 2021-07-13 RX ADMIN — ACETAMINOPHEN 650 MG: 325 TABLET ORAL at 04:25

## 2021-07-13 RX ADMIN — HYDROXYZINE HYDROCHLORIDE 50 MG: 25 TABLET, FILM COATED ORAL at 12:17

## 2021-07-13 RX ADMIN — OLANZAPINE 10 MG: 10 TABLET, FILM COATED ORAL at 21:19

## 2021-07-13 RX ADMIN — OXYCODONE HYDROCHLORIDE 10 MG: 10 TABLET ORAL at 07:50

## 2021-07-13 NOTE — PROGRESS NOTES
Progress Note - Reema Urena 77 y o  female MRN: [id number]    Unit/Bed#Jose Raul Been 201-01 Encounter: [id number]        Subjective:   Patient seen and examined at bedside after reviewing the chart and discussing the case with the caring staff  Patient examined at bedside  Patient has no acute complaints  Patient is going to a personal care home in [facility]  Patient will be requiring all her medications  I reviewed and reconciled patient's problem list and medications  Physical Exam   Vitals: Blood pressure 170/74, pulse 68, temperature 97 5 °F (36 4 °C), temperature source Temporal, resp  rate 18, height 5' 3" (1 6 m), weight 101 kg (221 lb 12 5 oz), SpO2 96 %  ,Body mass index is 39 29 kg/m²  Constitutional: He appears well-developed  HEENT: PERR, EOMI, MMM  Cardiovascular: Normal rate and regular rhythm  Pulmonary/Chest: Effort normal and breath sounds normal    Abdomen: Soft, + BS, NT    Assessment/Plan:  Reema Urena is a(n) 77y o  year old female with MDD with psychotic symptoms    MEDICAL CLEARANCE  Patient is medically cleared for discharge  All scripts will be sent out for the patient once discharge is finalized to the relevant pharmacy      1  Cardiac with history of hypertension, dyslipidemia, CHF, atrial fibrillation, coronary artery disease status post pacemaker placement  Patient will be continued on metoprolol 25 mg b i d , amlodipine 5 mg daily, Imdur 30 mg daily, Pravachol 20 mg daily and Eliquis 5 mg 2 times daily  2  Neuropathy  Patient is on gabapentin 300 mg 3 times daily  3  GERD  Patient is on Protonix 40 mg daily  4  DJD/osteoarthritis  Patient is on oxycodone 10 mg severe pain along with Lidoderm patch for shoulder pain and lower back pain  Patient may use heating pad  5  Gait abnormality  PT OT consulted  6  Vitamin-D deficiency  Patient is on vitamin-D bolus doses for 8 weeks followed by vitamin D3 1000 units daily  7  Vitamin B12 deficiency  Patient is on monthly B12 injections  8  Acute right foot pain  Patient's x-ray of the right foot completed on 06/28/2021 showed no acute osseous abnormalities

## 2021-07-13 NOTE — SOCIAL WORK
RT NS and patient obtained bank statements     CARTER faxed to [facility] MA (fax: [phone]) as requested - awaiting response     SW met with pt to advise paperwork sent; pt responded "so now I'm ready to go"; SW explained Mrs Surya Mckeon explained to patient yesterday that it may take up to 48 hours before she receives the paperwork and then she will process the re-certification; once the re-certification is completed, the [place] will still need to be completed; pt states "oh my god  I'm going to be here another month!"; pt continued to verbally express her frustration while SW provided support, education and redirection; pt expressed understanding of extended stay due to lack of MA re-certification and transfer of MA to [facility]; CARTER advised [facility] confirmed bed will continue to be held and as soon as new MA benes confirmed in [facility], pt will DC;  No additional questions or concerns for CARTER

## 2021-07-13 NOTE — PROGRESS NOTES
07/13/21   Team Meeting   Meeting Type Daily Rounds   Team Members Present   Team Members Present Physician;Nurse;   Physician Team Member Dr Oumar Daniel MD; JOSE Wood   Nursing Team Member Geoffrey Funez RN   Social Work Team Member Ginna [facility]   OT Team Member Shavon Guy [place]   Patient/Family Present   Patient Present No   Patient's Family Present No     Ready for DC - awaiting MA transfer for DC to [facility]; denies on everything; prns utilized yesterday afternoon due to restlessness and anxiety

## 2021-07-13 NOTE — NURSING NOTE
Pt requested/received atarax 50 mg for anxiety over discharge disposition and situational moving  Monitoring continues

## 2021-07-13 NOTE — SOCIAL WORK
SW met with patient to obtain bank records - pt states she was unable to get bank statements faxed per bank rules; pt states bank provided her with her login information to obtain them online; RT NS agreed to meet with pt to download bank statements for MA re-certification

## 2021-07-13 NOTE — CMS CERTIFICATION NOTE
Recertification: Based upon physical, mental and social evaluations, I certify that inpatient psychiatric services continue to be medically necessary for this patient for a duration of 20 midnights for the treatment of MDD (major depressive disorder), recurrent, severe, with psychosis [facility]   Available alternative community resources still do not meet the patient's mental health care needs  I further attest that an established written individualized plan of care has been updated and is outlined in the patient's medical records

## 2021-07-13 NOTE — PROGRESS NOTES
Progress Note - Behavioral Health   Elisabet Multani 77 y o  female MRN: [id number]  Unit/Bed#: Sheri Correia 201-01 Encounter: [id number]    Assessment/Plan   Principal Problem:    MDD (major depressive disorder), recurrent, severe, with psychosis ([facility] [address] )  Active Problems:    Essential hypertension    Chronic hand pain, right    Chronic low back pain    Osteoarthritis of lumbar spine with myelopathy    Vitamin D deficiency    CAD (coronary artery disease)    Behavior over the last 24 hours:  unchanged    Sleep: sleeping well without nightmares  Appetite: good appetite  Medication side effects: No  ROS: no complaints, all other systems are negative for acute changes    Mental Status Evaluation:  Appearance:  age appropriate and dressed in hospital paper scrubs   Behavior:  calm and cooperative   Speech:  spontaneous with regular rate, volume, and pitch    Mood:  "hopeful"   Affect:  mood-congruent   Thought Process:  Logical, organized and goal-directed   Thought Content:  focused of needing to renew MA paperwork in preparation for discharge   Perceptual Disturbances: None   Risk Potential: Suicidal Ideations none  Homicidal Ideations none  Potential for Aggression No   Sensorium:  Person, place, time and situation   Memory:  recent and remote memory grossly intact   Consciousness:  alert and awake    Attention: attention span and concentration were age appropriate   Insight:  fair   Judgment: fair   Gait/Station: normal gait/station   Motor Activity: no abnormal movements     Progress Toward Goals: patient is "hopeful" today and discussed that she was approved for housing following the video conference she had last week  She stated that the next step is to get her MA paperwork renewed  Patient has been compliant with medication and denies any current side effects  Recommended Treatment: Continue with group therapy, milieu therapy and occupational therapy        Risks, benefits and possible side effects of Medications: Risks, benefits, and possible side effects of medications explained to patient and patient verbalizes understanding  Medications: all current active meds have been reviewed  Labs: I have personally reviewed all pertinent laboratory/tests results  Most Recent Labs:   Lab Results   Component Value Date    WBC 5 70 07/06/2021    RBC 3 80 (L) 07/06/2021    HGB 11 8 (L) 07/06/2021    HCT 35 1 (L) 07/06/2021     07/06/2021    RDW 14 5 07/06/2021    NEUTROABS 2 80 07/06/2021    SODIUM 139 07/06/2021    K 4 2 07/06/2021     07/06/2021    CO2 28 07/06/2021    BUN 17 07/06/2021    CREATININE 0 81 07/06/2021    GLUC 84 07/06/2021    GLUF 84 07/06/2021    CALCIUM 8 7 07/06/2021    AST 17 07/06/2021    ALT 19 07/06/2021    ALKPHOS 138 07/06/2021    TP 6 0 (L) 07/06/2021    ALB 3 5 07/06/2021    TBILI 0 40 07/06/2021    [id number] 1 838 06/13/2021       Counseling / Coordination of Care  Total floor / unit time spent today 20 minutes  Greater than 50% of total time was spent with the patient and / or family counseling and / or coordination of care

## 2021-07-13 NOTE — NURSING NOTE
Slept well throughout most of the night  Respirations even and unlabored  Patient woke up for pain medication  Medication effective  Patient was able to go back to sleep  Safety measures maintained  Safety checks continue  No distress noted or reported  Will continue to monitor

## 2021-07-13 NOTE — PLAN OF CARE
Problem: DISCHARGE PLANNING - CARE MANAGEMENT  Goal: Discharge to post-acute care or home with appropriate resources  Description: INTERVENTIONS:  - Conduct assessment to determine patient/family and health care team treatment goals, and need for post-acute services based on payer coverage, community resources, and patient preferences, and barriers to discharge  - Address psychosocial, clinical, and financial barriers to discharge as identified in assessment in conjunction with the patient/family and health care team  - Arrange appropriate level of post-acute services according to patients   needs and preference and payer coverage in collaboration with the physician and health care team  - Communicate with and update the patient/family, physician, and health care team regarding progress on the discharge plan  - Arrange appropriate transportation to post-acute venues  Outcome: Progressing     Pt progressing;  No DC date - will DC to [facility] upon MA re-certification and inter-county transfer

## 2021-07-13 NOTE — NURSING NOTE
Patient out for breakfast and social with peers  Pt speech is loud but appropriate  Pt affect brighten upon interaction and mood is anxious  Pt denies anxiety and depression  Pt denies SI/HI/AVH  Pt requested/received oxycodone for generalized pain  No acute behaviors noted  Compliant with medications  Q 7 min safety checks maintained

## 2021-07-14 PROCEDURE — 99232 SBSQ HOSP IP/OBS MODERATE 35: CPT | Performed by: PSYCHIATRY & NEUROLOGY

## 2021-07-14 RX ORDER — VENLAFAXINE HYDROCHLORIDE 150 MG/1
150 CAPSULE, EXTENDED RELEASE ORAL DAILY
Status: DISCONTINUED | OUTPATIENT
Start: 2021-07-15 | End: 2021-07-22 | Stop reason: HOSPADM

## 2021-07-14 RX ADMIN — OXYCODONE HYDROCHLORIDE 10 MG: 10 TABLET ORAL at 16:51

## 2021-07-14 RX ADMIN — PRAVASTATIN SODIUM 20 MG: 20 TABLET ORAL at 16:08

## 2021-07-14 RX ADMIN — OXYCODONE HYDROCHLORIDE 10 MG: 10 TABLET ORAL at 11:56

## 2021-07-14 RX ADMIN — PANTOPRAZOLE SODIUM 40 MG: 40 TABLET, DELAYED RELEASE ORAL at 06:16

## 2021-07-14 RX ADMIN — APIXABAN 5 MG: 5 TABLET, FILM COATED ORAL at 17:02

## 2021-07-14 RX ADMIN — PHENYTOIN 2 MG: 125 SUSPENSION ORAL at 21:36

## 2021-07-14 RX ADMIN — APIXABAN 5 MG: 5 TABLET, FILM COATED ORAL at 08:12

## 2021-07-14 RX ADMIN — METOPROLOL TARTRATE 25 MG: 25 TABLET, FILM COATED ORAL at 21:36

## 2021-07-14 RX ADMIN — OLANZAPINE 10 MG: 10 TABLET, FILM COATED ORAL at 21:36

## 2021-07-14 RX ADMIN — OXYCODONE HYDROCHLORIDE 10 MG: 10 TABLET ORAL at 21:37

## 2021-07-14 RX ADMIN — ISOSORBIDE MONONITRATE 30 MG: 30 TABLET, EXTENDED RELEASE ORAL at 08:13

## 2021-07-14 RX ADMIN — METOPROLOL TARTRATE 25 MG: 25 TABLET, FILM COATED ORAL at 08:12

## 2021-07-14 RX ADMIN — OXYCODONE HYDROCHLORIDE 10 MG: 10 TABLET ORAL at 03:00

## 2021-07-14 RX ADMIN — OLANZAPINE 5 MG: 5 TABLET, FILM COATED ORAL at 08:12

## 2021-07-14 RX ADMIN — GABAPENTIN 300 MG: 300 CAPSULE ORAL at 08:12

## 2021-07-14 RX ADMIN — ERGOCALCIFEROL 50000 UNITS: 1.25 CAPSULE ORAL at 08:12

## 2021-07-14 RX ADMIN — MELATONIN 3 MG: at 21:36

## 2021-07-14 RX ADMIN — OXYCODONE HYDROCHLORIDE 10 MG: 10 TABLET ORAL at 07:39

## 2021-07-14 RX ADMIN — LIDOCAINE 3 PATCH: 50 PATCH TOPICAL at 08:13

## 2021-07-14 RX ADMIN — Medication 2 G: at 16:50

## 2021-07-14 RX ADMIN — GABAPENTIN 300 MG: 300 CAPSULE ORAL at 16:08

## 2021-07-14 RX ADMIN — GABAPENTIN 300 MG: 300 CAPSULE ORAL at 21:36

## 2021-07-14 RX ADMIN — VENLAFAXINE HYDROCHLORIDE 225 MG: 150 CAPSULE, EXTENDED RELEASE ORAL at 08:12

## 2021-07-14 RX ADMIN — AMLODIPINE BESYLATE 5 MG: 5 TABLET ORAL at 08:12

## 2021-07-14 NOTE — SOCIAL WORK
CARTER received fax receipt stating fax not received     SW refaxed information to [facility] - awaiting response

## 2021-07-14 NOTE — NURSING NOTE
Patient was visible in the community this evening, attending snack time  She is pleasant and cooperative during staff interactions  Speech continues to be loud  She denies anxiety and depression, denies SI, HI and hallucinations  She states she is "feeling good today " Patient was medication compliant at ; lidoderm patch x 3 removed and discarded as per protocol  PRN Oxycodone given at [facility] for 7/10 lower back pain provided relief  Will CTM via q7 minute safety checks

## 2021-07-14 NOTE — SOCIAL WORK
SW met with patient in pt room to provide update; pt expressed understanding; no additional questions; SW will continue to meet with patient as needed for tx and dc planning

## 2021-07-14 NOTE — NURSING NOTE
Pt attends & participates in Group  Knee-high Kaiden stockings applied as ordered by MD  Q 7 min checks maintained to monitor pt's behavior & safety

## 2021-07-14 NOTE — PROGRESS NOTES
Progress Note - Behavioral Health   Dana Pedro 77 y o  female MRN: [id number]  Unit/Bed#: Brigida Beal 201-01 Encounter: [id number]    Assessment/Plan   Principal Problem:    MDD (major depressive disorder), recurrent, severe, with psychosis ([facility] [address] )  Active Problems:    Essential hypertension    Chronic hand pain, right    Chronic low back pain    Osteoarthritis of lumbar spine with myelopathy    Vitamin D deficiency    CAD (coronary artery disease)      Behavior over the last 24 hours:  unchanged  Sleep: sleep on and off  Appetite: normal  Medication side effects: No  ROS: chronic back pain, all other systems are negative for acute changes    Mental Status Evaluation:  Appearance:  age appropriate   Behavior:  cooperative   Speech:  normal volume   Mood:  euthymic   Affect:  mood-congruent   Thought Process:  goal directed   Thought Content:  no overt delusions   Perceptual Disturbances: None   Risk Potential: Suicidal Ideations none  Homicidal Ideations none  Potential for Aggression No   Sensorium:  person, place and time/date   Memory:  recent and remote memory grossly intact   Consciousness:  alert and awake    Attention: attention span and concentration were age appropriate   Insight:  limited   Judgment: fair   Gait/Station: normal gait/station   Motor Activity: no abnormal movements     Progress Toward Goals:  Patient reports reduced depressed mood, anxiety with fair participation in group and milieu therapy  She admits broken sleep due to ongoing chronic pain, needing p r n  pain medication periodically  She is in agreement with current discharge plan  Recommended Treatment: Continue with group therapy, milieu therapy and occupational therapy  [facility] placement is in progress  Risks, benefits and possible side effects of Medications:   Risks, benefits, and possible side effects of medications explained to patient and patient verbalizes understanding        Medications: all current active meds have been reviewed  Labs: I have personally reviewed all pertinent laboratory/tests results  Most Recent Labs:   Lab Results   Component Value Date    WBC 5 70 07/06/2021    RBC 3 80 (L) 07/06/2021    HGB 11 8 (L) 07/06/2021    HCT 35 1 (L) 07/06/2021     07/06/2021    RDW 14 5 07/06/2021    NEUTROABS 2 80 07/06/2021    SODIUM 139 07/06/2021    K 4 2 07/06/2021     07/06/2021    CO2 28 07/06/2021    BUN 17 07/06/2021    CREATININE 0 81 07/06/2021    GLUC 84 07/06/2021    GLUF 84 07/06/2021    CALCIUM 8 7 07/06/2021    AST 17 07/06/2021    ALT 19 07/06/2021    ALKPHOS 138 07/06/2021    TP 6 0 (L) 07/06/2021    ALB 3 5 07/06/2021    TBILI 0 40 07/06/2021    [id number] 1 838 06/13/2021       Counseling / Coordination of Care  Total floor / unit time spent today 20 minutes  Greater than 50% of total time was spent with the patient and / or family counseling and / or coordination of care

## 2021-07-14 NOTE — PROGRESS NOTES
07/14/21   Team Meeting   Meeting Type Daily Rounds   Team Members Present   Team Members Present Physician;Nurse;; Occupational Therapist   Physician Team Member Dr Trav Bhatia MD; JOSE Huang   Nursing Team Member Maryjane Remy RN   [facility]   OT Team Member Yefri Vaughan [place]   Patient/Family Present   Patient Present No   Patient's Family Present No     DC ASAP - awaiting MA re-certification and inter-county transfer for DC to [facility]; anx 3/10, denies dep; loud and intrusive at times; c/o pain - utilizes prns often

## 2021-07-14 NOTE — PLAN OF CARE
Problem: DISCHARGE PLANNING - CARE MANAGEMENT  Goal: Discharge to post-acute care or home with appropriate resources  Description: INTERVENTIONS:  - Conduct assessment to determine patient/family and health care team treatment goals, and need for post-acute services based on payer coverage, community resources, and patient preferences, and barriers to discharge  - Address psychosocial, clinical, and financial barriers to discharge as identified in assessment in conjunction with the patient/family and health care team  - Arrange appropriate level of post-acute services according to patients   needs and preference and payer coverage in collaboration with the physician and health care team  - Communicate with and update the patient/family, physician, and health care team regarding progress on the discharge plan  - Arrange appropriate transportation to post-acute venues  Outcome: Progressing     Pt ready for discharge; awaiting MA transfer for [facility] placement; accepted at [facility] (Esthela HALLMAN)

## 2021-07-14 NOTE — SOCIAL WORK
CARTER received 3 additional fax error sheets    CARTER placed phone call to [facility]/[phone]; CARTER adsived of failed fax attempts - confirmed fax # as number CARTER is trying ([phone]);  Mrs Lucretia Dougherty advised CARTER to email bank statements securely to [employer]    CARTER emailed as requested

## 2021-07-14 NOTE — PROGRESS NOTES
Progress Note - Luda Boucher 77 y o  female MRN: [id number]    Unit/Bed#Oswaldo Ta 201-01 Encounter: [id number]        Subjective:   Patient seen and examined at bedside after reviewing the chart and discussing the case with the caring staff  Patient examined at bedside  Patient is having trouble with her left hand index and middle finger as she lives trigger finger at night which is very painful  Patient is going to a personal care home [facility]  Patient will be requiring all her medications  I reviewed and reconciled patient's problem list and medications  Physical Exam   Vitals: Blood pressure 145/76, pulse 64, temperature 97 6 °F (36 4 °C), temperature source Temporal, resp  rate 16, height 5' 3" (1 6 m), weight 104 kg (228 lb 6 3 oz), SpO2 96 %  ,Body mass index is 40 46 kg/m²  Constitutional: He appears well-developed  HEENT: PERR, EOMI, MMM  Cardiovascular: Normal rate and regular rhythm  Pulmonary/Chest: Effort normal and breath sounds normal    Abdomen: Soft, + BS, NT    Assessment/Plan:  Luda Boucher is a(n) 77y o  year old female with MDD with psychotic symptoms    MEDICAL CLEARANCE  Patient is medically cleared for discharge  All scripts will be sent out for the patient once discharge is finalized to the relevant pharmacy      1  Cardiac with history of hypertension, dyslipidemia, CHF, atrial fibrillation, coronary artery disease status post pacemaker placement  Patient will be continued on metoprolol 25 mg b i d , amlodipine 5 mg daily, Imdur 30 mg daily, Pravachol 20 mg daily and Eliquis 5 mg 2 times daily  2  Neuropathy  Patient is on gabapentin 300 mg 3 times daily  3  GERD  Patient is on Protonix 40 mg daily  4  DJD/osteoarthritis  Patient is on oxycodone 10 mg severe pain along with Lidoderm patch for shoulder pain and lower back pain  Patient may use heating pad  5  Gait abnormality  PT OT consulted  6  Vitamin-D deficiency    Patient is on vitamin-D bolus doses for 8 weeks followed by vitamin D3 1000 units daily  7  Vitamin B12 deficiency  Patient is on monthly B12 injections  8  Left hand trigger finger index and middle  Patient may apply Voltaren gel over the affected fingers

## 2021-07-14 NOTE — NURSING NOTE
Patient is awake at this time  She remains quietly in her room  PRN Oxycodone given at 0300 was effective for patient  No further complaints voiced  No acute behaviors noted  Will CTM via q7 minute safety checks

## 2021-07-14 NOTE — PROGRESS NOTES
Daughter brought the following    1 bag of 33 poise pads  2 small bags of wether candies  1 box of nips candies  1 bottle pantene shampoo  1 bottle of pantene conditioner  1 bottle of olay body wash        PT signed for all the above

## 2021-07-14 NOTE — PROGRESS NOTES
Pt up ad krish & gait is steady  Pt medicated for lower back pain @ 0739 with Oxy-IR po as ordered by MD with relief obtained  Pt denies any depression & c/o anxiety 3/10  Q 7 min checks maintained to monitor pt's behavior & safety  Pt can be loud & intrusive @ times  Pt denies any hallucinations, suicidal or homicidal ideations  Pt is cooperative & compliant with medications  Pt does socialize with other patients

## 2021-07-15 PROCEDURE — 99232 SBSQ HOSP IP/OBS MODERATE 35: CPT | Performed by: PSYCHIATRY & NEUROLOGY

## 2021-07-15 RX ADMIN — Medication 2 G: at 14:49

## 2021-07-15 RX ADMIN — OXYCODONE HYDROCHLORIDE 10 MG: 10 TABLET ORAL at 10:33

## 2021-07-15 RX ADMIN — OLANZAPINE 10 MG: 10 TABLET, FILM COATED ORAL at 21:15

## 2021-07-15 RX ADMIN — GABAPENTIN 300 MG: 300 CAPSULE ORAL at 21:15

## 2021-07-15 RX ADMIN — PANTOPRAZOLE SODIUM 40 MG: 40 TABLET, DELAYED RELEASE ORAL at 06:02

## 2021-07-15 RX ADMIN — PRAVASTATIN SODIUM 20 MG: 20 TABLET ORAL at 15:44

## 2021-07-15 RX ADMIN — PHENYTOIN 2 MG: 125 SUSPENSION ORAL at 21:15

## 2021-07-15 RX ADMIN — OXYCODONE HYDROCHLORIDE 10 MG: 10 TABLET ORAL at 14:34

## 2021-07-15 RX ADMIN — MELATONIN 3 MG: at 21:15

## 2021-07-15 RX ADMIN — HYDROXYZINE HYDROCHLORIDE 25 MG: 25 TABLET, FILM COATED ORAL at 15:47

## 2021-07-15 RX ADMIN — APIXABAN 5 MG: 5 TABLET, FILM COATED ORAL at 17:07

## 2021-07-15 RX ADMIN — OXYCODONE HYDROCHLORIDE 10 MG: 10 TABLET ORAL at 06:04

## 2021-07-15 RX ADMIN — Medication 2 G: at 21:16

## 2021-07-15 RX ADMIN — METOPROLOL TARTRATE 25 MG: 25 TABLET, FILM COATED ORAL at 21:15

## 2021-07-15 RX ADMIN — VENLAFAXINE HYDROCHLORIDE 150 MG: 150 CAPSULE, EXTENDED RELEASE ORAL at 08:18

## 2021-07-15 RX ADMIN — GABAPENTIN 300 MG: 300 CAPSULE ORAL at 08:18

## 2021-07-15 RX ADMIN — METOPROLOL TARTRATE 25 MG: 25 TABLET, FILM COATED ORAL at 08:18

## 2021-07-15 RX ADMIN — GABAPENTIN 300 MG: 300 CAPSULE ORAL at 15:44

## 2021-07-15 RX ADMIN — AMLODIPINE BESYLATE 5 MG: 5 TABLET ORAL at 08:18

## 2021-07-15 RX ADMIN — APIXABAN 5 MG: 5 TABLET, FILM COATED ORAL at 08:18

## 2021-07-15 RX ADMIN — OLANZAPINE 5 MG: 5 TABLET, FILM COATED ORAL at 08:18

## 2021-07-15 RX ADMIN — LIDOCAINE 3 PATCH: 50 PATCH TOPICAL at 08:17

## 2021-07-15 RX ADMIN — OXYCODONE HYDROCHLORIDE 10 MG: 10 TABLET ORAL at 18:39

## 2021-07-15 RX ADMIN — ISOSORBIDE MONONITRATE 30 MG: 30 TABLET, EXTENDED RELEASE ORAL at 08:18

## 2021-07-15 NOTE — NURSING NOTE
Patient complained of 8/10 generalized pain  Gave 10 mg PRN Oxycodone as ordered for complaint of symptoms  Will continue to monitor

## 2021-07-15 NOTE — NURSING NOTE
Pt medicated for c/o anxiety @ 3988 with Atarax 25 mg po as ordered by MD with relief obtained  Cosme Williamson - 15 @ that time  Pt medicated for c/o lt hand pain from hx trigger finger with Voltaren gel prn with relief obtained  Q 7 min checks maintained to monitor pt's behavior & safety

## 2021-07-15 NOTE — NURSING NOTE
Patient out in the milieu social with peers and staff  Ate HS snack and attended group  Upon approach patients mood and affect is blunted and anxious  Denies any depression/SI/HI/AHVH  Patient complained of 9/10 generalized pain  Gave 10 mg PRN Oxycodone as ordered for complaint of symptoms  Took medications without difficulty  No behavioral issues  Will continue to monitor safety and behaviors every 7 minutes

## 2021-07-15 NOTE — PROGRESS NOTES
Progress Note - Elisabet Multani 77 y o  female MRN: [id number]    Unit/Bed#Radha Almazan 201-01 Encounter: [id number]        Subjective:   Patient seen and examined at bedside after reviewing the chart and discussing the case with the caring staff  Patient examined at bedside  Patient states that Voltaren gel is helping her left hand pain  She has no acute complaints  Patient is going to a personal care home in [facility]  Patient will be requiring all her medications  I reviewed and reconciled patient's problem list and medications  Physical Exam   Vitals: Blood pressure 127/65, pulse 65, temperature (!) 97 3 °F (36 3 °C), temperature source Temporal, resp  rate 16, height 5' 3" (1 6 m), weight 104 kg (228 lb 6 3 oz), SpO2 95 %  ,Body mass index is 40 46 kg/m²  Constitutional: He appears well-developed  HEENT: PERR, EOMI, MMM  Cardiovascular: Normal rate and regular rhythm  Pulmonary/Chest: Effort normal and breath sounds normal    Abdomen: Soft, + BS, NT    Assessment/Plan:  Elisabet Multani is a(n) 77y o  year old female with MDD with psychotic symptoms    MEDICAL CLEARANCE  Patient is medically cleared for discharge  All scripts will be sent out for the patient once discharge is finalized to the relevant pharmacy      1  Cardiac with history of hypertension, dyslipidemia, CHF, atrial fibrillation, coronary artery disease status post pacemaker placement  Patient will be continued on metoprolol 25 mg b i d , amlodipine 5 mg daily, Imdur 30 mg daily, Pravachol 20 mg daily and Eliquis 5 mg 2 times daily  2  Neuropathy  Patient is on gabapentin 300 mg 3 times daily  3  GERD  Patient is on Protonix 40 mg daily  4  DJD/osteoarthritis  Patient is on oxycodone 10 mg severe pain along with Lidoderm patch for shoulder pain and lower back pain  Patient may use heating pad  5  Gait abnormality  PT OT consulted  6  Vitamin-D deficiency    Patient is on vitamin-D bolus doses for 8 weeks followed by vitamin D3 1000 units daily  7  Vitamin B12 deficiency  Patient is on monthly B12 injections  8  Left hand trigger finger index and middle  Patient may apply Voltaren gel over the affected fingers  The patient was discussed with Dr Evelia Lepe and he is in agreement with the above note

## 2021-07-15 NOTE — SOCIAL WORK
CARTER received e-mail confirmation from [facility] that he will continue to hold the bed due to "needing MA to be in place to ensure she is able to get her meds when she gets here"; CARTER responded providing update and gratitude for continued hold of bed

## 2021-07-15 NOTE — PROGRESS NOTES
Progress Note - Behavioral Health   Lucrecia Reilly 77 y o  female MRN: [id number]  Unit/Bed#: Devorah Flores 201-01 Encounter: [id number]    Assessment/Plan   Principal Problem:    MDD (major depressive disorder), recurrent, severe, with psychosis ([facility] [address] )  Active Problems:    Essential hypertension    Chronic hand pain, right    Chronic low back pain    Osteoarthritis of lumbar spine with myelopathy    Vitamin D deficiency    CAD (coronary artery disease)      Behavior over the last 24 hours:  unchanged  Sleep: normal  Appetite: normal  Medication side effects: No  ROS: Back pain, otherwise all other systems negative for acute change     Topher Tariq was seen today for psychiatric follow-up  Patient was resting in bed during encounter and reports mild anxiety related to unknown disposition date  She denies any depression and states she is sleeping well and no longer has nightmares  According nursing staff, patient is intrusive and loud at times  However, Topher Tariq displayed good mood control during encounter  She denies any further AVH/SI/HI  She remains compliant with psychotropic regimen and appetite remains adequate  Topher Tariq is often visible in the milieu and social with select peers  She attends and participates in groups daily      Mental Status Evaluation:  Appearance:  age appropriate and casually dressed   Behavior:  cooperative   Speech:  normal pitch and normal volume   Mood:  mildly anxious   Affect:  mood-congruent and redirectable   Thought Process:  goal directed   Thought Content:  No overt delusions   Perceptual Disturbances: None   Risk Potential: Suicidal Ideations none  Homicidal Ideations none  Potential for Aggression No   Sensorium:  person, place, time/date and situation   Memory:  recent and remote memory grossly intact   Consciousness:  alert and awake    Attention: attention span and concentration were age appropriate   Insight:  limited   Judgment: fair   Gait/Station: normal gait/station and normal balance   Motor Activity: no abnormal movements     Progress Toward Goals:  No change  Will continue with current psychotropic regimen; patient tolerating well  Awaiting MA re-certification and inter-county transfer to discharge to [facility] home [facility])  No discharge date at this time  Recommended Treatment: Continue with group therapy, milieu therapy and occupational therapy  Risks, benefits and possible side effects of Medications:   Risks, benefits, and possible side effects of medications explained to patient and patient verbalizes understanding        Medications:   all current active meds have been reviewed, continue current psychiatric medications and current meds:   Current Facility-Administered Medications   Medication Dose Route Frequency    acetaminophen (TYLENOL) tablet 650 mg  650 mg Oral Q4H PRN    acetaminophen (TYLENOL) tablet 650 mg  650 mg Oral Q4H PRN    aluminum-magnesium hydroxide-simethicone (MYLANTA) oral suspension 30 mL  30 mL Oral Q4H PRN    amLODIPine (NORVASC) tablet 5 mg  5 mg Oral Daily    apixaban (ELIQUIS) tablet 5 mg  5 mg Oral BID    [START ON 8/4/2021] cholecalciferol (VITAMIN D3) tablet 1,000 Units  1,000 Units Oral Daily    Diclofenac Sodium (VOLTAREN) 1 % topical gel 2 g  2 g Topical 4x Daily PRN    ergocalciferol (VITAMIN D2) capsule 50,000 Units  50,000 Units Oral Weekly    gabapentin (NEURONTIN) capsule 300 mg  300 mg Oral TID    hydrOXYzine HCL (ATARAX) tablet 25 mg  25 mg Oral Q6H PRN Max 4/day    hydrOXYzine HCL (ATARAX) tablet 50 mg  50 mg Oral Q6H PRN    isosorbide mononitrate (IMDUR) 24 hr tablet 30 mg  30 mg Oral Daily    lidocaine (LIDODERM) 5 % patch 3 patch  3 patch Topical Daily    loperamide (IMODIUM) capsule 2 mg  2 mg Oral TID PRN    LORazepam (ATIVAN) tablet 0 5 mg  0 5 mg Oral Q8H PRN    melatonin tablet 3 mg  3 mg Oral HS    metoprolol tartrate (LOPRESSOR) tablet 25 mg  25 mg Oral Q12H [address]    OLANZapine (ZyPREXA) IM injection 5 mg  5 mg Intramuscular Q6H PRN Max 4/day    OLANZapine (ZyPREXA) tablet 10 mg  10 mg Oral HS    OLANZapine (ZyPREXA) tablet 2 5 mg  2 5 mg Oral Q6H PRN Max 4/day    OLANZapine (ZyPREXA) tablet 5 mg  5 mg Oral Q6H PRN Max 4/day    OLANZapine (ZyPREXA) tablet 5 mg  5 mg Oral Daily    ondansetron (ZOFRAN-ODT) dispersible tablet 4 mg  4 mg Oral Q6H PRN    oxyCODONE (ROXICODONE) immediate release tablet 10 mg  10 mg Oral Q4H PRN    pantoprazole (PROTONIX) EC tablet 40 mg  40 mg Oral Early Morning    polyethylene glycol (MIRALAX) packet 17 g  17 g Oral Daily PRN    pravastatin (PRAVACHOL) tablet 20 mg  20 mg Oral Daily With Dinner    prazosin (MINIPRESS) capsule 2 mg  2 mg Oral HS    risperiDONE (RisperDAL M-TAB) disintegrating tablet 0 5 mg  0 5 mg Oral Q6H PRN Max 4/day    senna-docusate sodium (SENOKOT S) 8 6-50 mg per tablet 1 tablet  1 tablet Oral Daily PRN    traZODone (DESYREL) tablet 50 mg  50 mg Oral HS PRN    venlafaxine (EFFEXOR-XR) 24 hr capsule 150 mg  150 mg Oral Daily     Labs: I have personally reviewed all pertinent laboratory/tests results  Counseling / Coordination of Care  Total floor / unit time spent today 25 minutes  Greater than 50% of total time was spent with the patient and / or family counseling and / or coordination of care

## 2021-07-15 NOTE — PROGRESS NOTES
07/15/21   Team Meeting   Meeting Type Daily Rounds   Team Members Present   Team Members Present Physician;Nurse;;Occupational Therapist   Physician Team Member Dr Honey Argueta MD; JOSE Russell   Nursing Team Member Breana Rodriguez, [facility]   Care Management Team Member MS Renee, , [facility]   OT Team Member Kimberlee Lane [place]   Patient/Family Present   Patient Present No   Patient's Family Present No   Awaiting MA for recertification, inter county transfer, once completed will go to [employer]  Can be intrusive at times, prns for pain  Rates anxiety 4/10

## 2021-07-16 PROCEDURE — 99232 SBSQ HOSP IP/OBS MODERATE 35: CPT | Performed by: PSYCHIATRY & NEUROLOGY

## 2021-07-16 RX ADMIN — OXYCODONE HYDROCHLORIDE 10 MG: 10 TABLET ORAL at 17:14

## 2021-07-16 RX ADMIN — OXYCODONE HYDROCHLORIDE 10 MG: 10 TABLET ORAL at 00:41

## 2021-07-16 RX ADMIN — APIXABAN 5 MG: 5 TABLET, FILM COATED ORAL at 17:14

## 2021-07-16 RX ADMIN — OLANZAPINE 5 MG: 5 TABLET, FILM COATED ORAL at 08:09

## 2021-07-16 RX ADMIN — PRAVASTATIN SODIUM 20 MG: 20 TABLET ORAL at 16:18

## 2021-07-16 RX ADMIN — METOPROLOL TARTRATE 25 MG: 25 TABLET, FILM COATED ORAL at 08:10

## 2021-07-16 RX ADMIN — VENLAFAXINE HYDROCHLORIDE 150 MG: 150 CAPSULE, EXTENDED RELEASE ORAL at 08:10

## 2021-07-16 RX ADMIN — PANTOPRAZOLE SODIUM 40 MG: 40 TABLET, DELAYED RELEASE ORAL at 06:00

## 2021-07-16 RX ADMIN — Medication 2 G: at 16:18

## 2021-07-16 RX ADMIN — GABAPENTIN 300 MG: 300 CAPSULE ORAL at 08:10

## 2021-07-16 RX ADMIN — ISOSORBIDE MONONITRATE 30 MG: 30 TABLET, EXTENDED RELEASE ORAL at 08:10

## 2021-07-16 RX ADMIN — METOPROLOL TARTRATE 25 MG: 25 TABLET, FILM COATED ORAL at 21:05

## 2021-07-16 RX ADMIN — APIXABAN 5 MG: 5 TABLET, FILM COATED ORAL at 08:09

## 2021-07-16 RX ADMIN — OXYCODONE HYDROCHLORIDE 10 MG: 10 TABLET ORAL at 07:44

## 2021-07-16 RX ADMIN — GABAPENTIN 300 MG: 300 CAPSULE ORAL at 16:18

## 2021-07-16 RX ADMIN — OLANZAPINE 10 MG: 10 TABLET, FILM COATED ORAL at 21:05

## 2021-07-16 RX ADMIN — MELATONIN 3 MG: at 21:05

## 2021-07-16 RX ADMIN — HYDROXYZINE HYDROCHLORIDE 50 MG: 25 TABLET, FILM COATED ORAL at 12:54

## 2021-07-16 RX ADMIN — PHENYTOIN 2 MG: 125 SUSPENSION ORAL at 21:05

## 2021-07-16 RX ADMIN — GABAPENTIN 300 MG: 300 CAPSULE ORAL at 21:05

## 2021-07-16 RX ADMIN — Medication 2 G: at 07:43

## 2021-07-16 RX ADMIN — AMLODIPINE BESYLATE 5 MG: 5 TABLET ORAL at 08:10

## 2021-07-16 RX ADMIN — Medication 2 G: at 03:18

## 2021-07-16 NOTE — NURSING NOTE
Pt up ad krish & gait is steady  Pt medicated for c/o chronic lower back pain @ 0743 with Oxy-IR po as ordered by MD with relief obtained  Pt medicated for c/o lt hand pain with Voltaren gel @ 2168 with moderate relief obtained  Pt denies depression & c/o anxiety 5/10  Pt is somatic, loud & intrusive  Pt is cooperative & compliant with medications  Q 7 min checks maintained to monitor pt's behavior & safety  Pt denies any hallucinations, suicidal or homicidal ideations  Knee high Kaiden stockings on & intact   Pt refused Lidoderm patches this AM

## 2021-07-16 NOTE — NURSING NOTE
Patient appears to be sleeping without difficulty throughout the night  Awake x1 for water and Voltaren Gel  Will continue to monitor safety and behaviors every 7 minutes

## 2021-07-16 NOTE — PROGRESS NOTES
Progress Note - Behavioral Health   Lora Montero 77 y o  female MRN: [id number]  Unit/Bed#: Elva Holbrook 201-01 Encounter: [id number]    Assessment/Plan   Principal Problem:    MDD (major depressive disorder), recurrent, severe, with psychosis ([facility] [address] )  Active Problems:    Essential hypertension    Chronic hand pain, right    Chronic low back pain    Osteoarthritis of lumbar spine with myelopathy    Vitamin D deficiency    CAD (coronary artery disease)      Behavior over the last 24 hours:  unchanged  Sleep: normal  Appetite: normal  Medication side effects: No  ROS: Back pain, otherwise all other systems negative for acute change     Vladimir Sims was seen today for psychiatric follow-up  She was ruminating about her diet and not being discharged yet  She went on to say Tylor Wolff told me I was leaving multiple times, and my discharge keeps getting canceled  I'm going to be stuck here for years    Patient reminded that there was no specific discharge date and we are awaiting inter county transfer before she can go to   She appeared receptive and then began ruminating again about her diet  She was irritable, but redirectable  She remains compliant with psychotropic regimen and is sleeping well throughout the night  She no longer has any nightmares or AVH  She denies SI/HI      Mental Status Evaluation:  Appearance:  age appropriate and casually dressed   Behavior:  Intrusive, cooperative   Speech:  loud and tangential   Mood:  anxious   Affect:  redirectable and Irritable edge   Thought Process:  circumstantial   Thought Content:  Some ruminations   Perceptual Disturbances: None   Risk Potential: Suicidal Ideations none  Homicidal Ideations none  Potential for Aggression No   Sensorium:  person, place, time/date and situation   Memory:  recent and remote memory grossly intact   Consciousness:  alert and awake    Attention: attention span appeared shorter than expected for age   Insight:  limited   Judgment: limited   Gait/Station: normal gait/station and normal balance   Motor Activity: no abnormal movements     Progress Toward Goals:  No change  Will continue with current psychotropic regimen; patient tolerating well  Continuing to await MA for re-certification and inter county transfer  Once complete, patient will go to [facility]  No discharge date at this time  Recommended Treatment: Continue with group therapy, milieu therapy and occupational therapy  Risks, benefits and possible side effects of Medications:   Risks, benefits, and possible side effects of medications explained to patient and patient verbalizes understanding        Medications:   all current active meds have been reviewed, continue current psychiatric medications and current meds:   Current Facility-Administered Medications   Medication Dose Route Frequency    acetaminophen (TYLENOL) tablet 650 mg  650 mg Oral Q4H PRN    acetaminophen (TYLENOL) tablet 650 mg  650 mg Oral Q4H PRN    aluminum-magnesium hydroxide-simethicone (MYLANTA) oral suspension 30 mL  30 mL Oral Q4H PRN    amLODIPine (NORVASC) tablet 5 mg  5 mg Oral Daily    apixaban (ELIQUIS) tablet 5 mg  5 mg Oral BID    [START ON 8/4/2021] cholecalciferol (VITAMIN D3) tablet 1,000 Units  1,000 Units Oral Daily    Diclofenac Sodium (VOLTAREN) 1 % topical gel 2 g  2 g Topical 4x Daily PRN    ergocalciferol (VITAMIN D2) capsule 50,000 Units  50,000 Units Oral Weekly    gabapentin (NEURONTIN) capsule 300 mg  300 mg Oral TID    hydrOXYzine HCL (ATARAX) tablet 25 mg  25 mg Oral Q6H PRN Max 4/day    hydrOXYzine HCL (ATARAX) tablet 50 mg  50 mg Oral Q6H PRN    isosorbide mononitrate (IMDUR) 24 hr tablet 30 mg  30 mg Oral Daily    lidocaine (LIDODERM) 5 % patch 3 patch  3 patch Topical Daily    loperamide (IMODIUM) capsule 2 mg  2 mg Oral TID PRN    LORazepam (ATIVAN) tablet 0 5 mg  0 5 mg Oral Q8H PRN    melatonin tablet 3 mg  3 mg Oral HS    metoprolol tartrate (LOPRESSOR) tablet 25 mg  25 mg Oral Q12H [address]    OLANZapine (ZyPREXA) IM injection 5 mg  5 mg Intramuscular Q6H PRN Max 4/day    OLANZapine (ZyPREXA) tablet 10 mg  10 mg Oral HS    OLANZapine (ZyPREXA) tablet 2 5 mg  2 5 mg Oral Q6H PRN Max 4/day    OLANZapine (ZyPREXA) tablet 5 mg  5 mg Oral Q6H PRN Max 4/day    OLANZapine (ZyPREXA) tablet 5 mg  5 mg Oral Daily    ondansetron (ZOFRAN-ODT) dispersible tablet 4 mg  4 mg Oral Q6H PRN    oxyCODONE (ROXICODONE) immediate release tablet 10 mg  10 mg Oral Q4H PRN    pantoprazole (PROTONIX) EC tablet 40 mg  40 mg Oral Early Morning    polyethylene glycol (MIRALAX) packet 17 g  17 g Oral Daily PRN    pravastatin (PRAVACHOL) tablet 20 mg  20 mg Oral Daily With Dinner    prazosin (MINIPRESS) capsule 2 mg  2 mg Oral HS    risperiDONE (RisperDAL M-TAB) disintegrating tablet 0 5 mg  0 5 mg Oral Q6H PRN Max 4/day    senna-docusate sodium (SENOKOT S) 8 6-50 mg per tablet 1 tablet  1 tablet Oral Daily PRN    traZODone (DESYREL) tablet 50 mg  50 mg Oral HS PRN    venlafaxine (EFFEXOR-XR) 24 hr capsule 150 mg  150 mg Oral Daily     Labs: I have personally reviewed all pertinent laboratory/tests results  Counseling / Coordination of Care  Total floor / unit time spent today 25 minutes  Greater than 50% of total time was spent with the patient and / or family counseling and / or coordination of care

## 2021-07-16 NOTE — NURSING NOTE
Patient out in the milieu social with peers and staff  Ate HS snack and attended group Upon approach patients mood and affect is blunted and anxious  Denies SI/HI/AHVH/depression  Speech is loud  Patient complained of left hand pain  Voltaren Gel applied  Took medications without difficulty  Will continue to monitor safety and behaviors every 7 minutes

## 2021-07-16 NOTE — PROGRESS NOTES
Progress Note - Bettie Justice 77 y o  female MRN: [id number]    Unit/Bed#[facility] Code 201-01 Encounter: [id number]        Subjective:   Patient seen and examined at bedside after reviewing the chart and discussing the case with the caring staff  Patient examined at bedside  Patient requesting a compression wrist brace for her left hand trigger finger index and middle  Patient is going to a personal care home in [facility]  Patient will be requiring all her medications  I reviewed and reconciled patient's problem list and medications  Physical Exam   Vitals: Blood pressure 143/70, pulse 63, temperature 97 5 °F (36 4 °C), temperature source Temporal, resp  rate 18, height 5' 3" (1 6 m), weight 104 kg (228 lb 6 3 oz), SpO2 99 %  ,Body mass index is 40 46 kg/m²  Constitutional: He appears well-developed  HEENT: PERR, EOMI, MMM  Cardiovascular: Normal rate and regular rhythm  Pulmonary/Chest: Effort normal and breath sounds normal    Abdomen: Soft, + BS, NT    Assessment/Plan:  Bettie Justice is a(n) 77y o  year old female with MDD with psychotic symptoms    MEDICAL CLEARANCE  Patient is medically cleared for discharge  All scripts will be sent out for the patient once discharge is finalized to the relevant pharmacy  1  Cardiac with history of hypertension, dyslipidemia, CHF, atrial fibrillation, coronary artery disease status post pacemaker placement  Patient will be continued on metoprolol 25 mg b i d , amlodipine 5 mg daily, Imdur 30 mg daily, Pravachol 20 mg daily and Eliquis 5 mg 2 times daily  2  Neuropathy  Patient is on gabapentin 300 mg 3 times daily  3  GERD  Patient is on Protonix 40 mg daily  4  DJD/osteoarthritis  Patient is on oxycodone 10 mg severe pain along with Lidoderm patch for shoulder pain and lower back pain  Patient may use heating pad  5  Gait abnormality  PT OT consulted  6  Vitamin-D deficiency    Patient is on vitamin-D bolus doses for 8 weeks followed by vitamin D3 1000 units daily  7  Vitamin B12 deficiency  Patient is on monthly B12 injections  8  Left hand trigger finger index and middle  Patient may apply Voltaren gel over the affected fingers  Spoke with PT who states they do not have compression wrist braces in inpatient setting as patient would need to be fitted for it  The patient was discussed with Dr Cristina Roy and he is in agreement with the above note

## 2021-07-16 NOTE — PROGRESS NOTES
07/16/21 1045   Activity/Group Checklist   Group   (Community Building and Art Therapy Processing)   Attendance Attended   Attendance Duration (min) Greater than 60   Interactions Interacted appropriately   Affect/Mood Appropriate;Bright   Goals Achieved Identified feelings; Identified triggers; Discussed coping strategies; Able to listen to others; Able to engage in interactions; Able to recieve feedback; Able to give feedback to another

## 2021-07-16 NOTE — NURSING NOTE
Pt medicated for c/o increased anxiety 7/10 @ 1254 with Atarax 50 mg po with relief obtained  Tiki - 18 @ that time  Pt is somatic & irritable today  Pt upset that she wasn't discharged to [facility] today  Q 7 min checks maintained to monitor pt's behavior & safety

## 2021-07-16 NOTE — PROGRESS NOTES
07/16/21    Team Meeting   Meeting Type Daily Rounds   Team Members Present   Team Members Present Physician;Nurse;   Physician Team Member Dr Asim Cline MD; JOSE Guerrero   Nursing Team Member Miryam Lam RN   Care Management Team Member Santosh Garza MS, [facility]   Patient/Family Present   Patient Present No   Patient's Family Present No   Awaiting MA for recertification, inter county transfer, once completed will go to [employer]  Loud, intrusive  Inappropriate word use/comments, responds to redirection  Compression stockings

## 2021-07-16 NOTE — PROGRESS NOTES
07/16/21 1327   Team Meeting   Meeting Type Tx Team Meeting   Next Conference Date 08/15/21   Team Members Present   Team Members Present Physician;Nurse;   Physician Team Member Dr Howard Hairston MD   Nursing Team Member Jacqueline Rosario RN   Social Work Team Member MATA Quintanilla   Patient/Family Present   Patient Present Yes   Patient's Family Present No     30 Day Tx Plan Review    Patient and treatment team met and reviewed pt strengths, limitations, coping skills, treatment plan and goals; team also reviewed goals met successfully with patient; pt agreeable and signed; copy on chart

## 2021-07-16 NOTE — NURSING NOTE
Patient out to the desk complained of 8/10 lower back pain  Gave 10 mg PRN Oxycodone as order for complaint of symptoms  Will continue to monitor safety and behaviors every 7 minutes

## 2021-07-16 NOTE — PROGRESS NOTES
07/16/21 2105   Activity/Group Checklist   Group   [employer] and Goals)   Attendance Attended   Attendance Duration (min) 46-60   Interactions Interacted appropriately   Affect/Mood Appropriate;Calm   Goals Achieved Identified feelings; Discussed coping strategies; Able to listen to others; Able to engage in interactions; Able to recieve feedback; Able to give feedback to another

## 2021-07-16 NOTE — PLAN OF CARE
Problem: DISCHARGE PLANNING - CARE MANAGEMENT  Goal: Discharge to post-acute care or home with appropriate resources  Description: INTERVENTIONS:  - Conduct assessment to determine patient/family and health care team treatment goals, and need for post-acute services based on payer coverage, community resources, and patient preferences, and barriers to discharge  - Address psychosocial, clinical, and financial barriers to discharge as identified in assessment in conjunction with the patient/family and health care team  - Arrange appropriate level of post-acute services according to patients   needs and preference and payer coverage in collaboration with the physician and health care team  - Communicate with and update the patient/family, physician, and health care team regarding progress on the discharge plan  - Arrange appropriate transportation to post-acute venues  Outcome: Progressing     Pt progressing; awaiting MA re-certification and inter-county transfer for discharge to [facility]

## 2021-07-16 NOTE — SOCIAL WORK
CARTER met with pt in lounge area by elevators; pt expressed continued frustration re: length of stay; CARTER explained email sent Weds and was advised by Mrs Oneal Larson it would take 24-48 hours to get to her; CARTER agreed to follow up with MA to determine if received; CARTER explained it is unlikely pt would be able to discharge today due to logistics, COVID test and dc planning (follow up appts in [facility]) required; pt again voiced frustration - CARTER provided support as appropriate;  No additional questions or concerns for CARTER MACHADO placed phone call to [facility] MA/[phone]; left message on voicemail - awaiting response

## 2021-07-17 PROCEDURE — 99232 SBSQ HOSP IP/OBS MODERATE 35: CPT | Performed by: PSYCHIATRY & NEUROLOGY

## 2021-07-17 RX ADMIN — VENLAFAXINE HYDROCHLORIDE 150 MG: 150 CAPSULE, EXTENDED RELEASE ORAL at 08:28

## 2021-07-17 RX ADMIN — GABAPENTIN 300 MG: 300 CAPSULE ORAL at 17:08

## 2021-07-17 RX ADMIN — OLANZAPINE 5 MG: 5 TABLET, FILM COATED ORAL at 08:28

## 2021-07-17 RX ADMIN — ISOSORBIDE MONONITRATE 30 MG: 30 TABLET, EXTENDED RELEASE ORAL at 08:29

## 2021-07-17 RX ADMIN — Medication 2 G: at 22:16

## 2021-07-17 RX ADMIN — AMLODIPINE BESYLATE 5 MG: 5 TABLET ORAL at 08:29

## 2021-07-17 RX ADMIN — LORAZEPAM 0.5 MG: 0.5 TABLET ORAL at 12:06

## 2021-07-17 RX ADMIN — GABAPENTIN 300 MG: 300 CAPSULE ORAL at 20:54

## 2021-07-17 RX ADMIN — METOPROLOL TARTRATE 25 MG: 25 TABLET, FILM COATED ORAL at 20:54

## 2021-07-17 RX ADMIN — PRAVASTATIN SODIUM 20 MG: 20 TABLET ORAL at 17:08

## 2021-07-17 RX ADMIN — METOPROLOL TARTRATE 25 MG: 25 TABLET, FILM COATED ORAL at 08:28

## 2021-07-17 RX ADMIN — APIXABAN 5 MG: 5 TABLET, FILM COATED ORAL at 17:08

## 2021-07-17 RX ADMIN — APIXABAN 5 MG: 5 TABLET, FILM COATED ORAL at 08:28

## 2021-07-17 RX ADMIN — Medication 2 G: at 03:36

## 2021-07-17 RX ADMIN — OLANZAPINE 10 MG: 10 TABLET, FILM COATED ORAL at 20:54

## 2021-07-17 RX ADMIN — OXYCODONE HYDROCHLORIDE 10 MG: 10 TABLET ORAL at 00:51

## 2021-07-17 RX ADMIN — Medication 2 G: at 10:17

## 2021-07-17 RX ADMIN — GABAPENTIN 300 MG: 300 CAPSULE ORAL at 08:28

## 2021-07-17 RX ADMIN — OXYCODONE HYDROCHLORIDE 10 MG: 10 TABLET ORAL at 08:29

## 2021-07-17 RX ADMIN — PHENYTOIN 2 MG: 125 SUSPENSION ORAL at 20:54

## 2021-07-17 RX ADMIN — PANTOPRAZOLE SODIUM 40 MG: 40 TABLET, DELAYED RELEASE ORAL at 05:38

## 2021-07-17 RX ADMIN — MELATONIN 3 MG: at 20:53

## 2021-07-17 RX ADMIN — OXYCODONE HYDROCHLORIDE 10 MG: 10 TABLET ORAL at 17:08

## 2021-07-17 NOTE — NURSING NOTE
Patient was observed to be in the community this evening  Attended group and snack time  She continues to be loud and intrusive  At time of assessment, patient informs she is comfortable however she rates her lower back pain 7/10  She rates anxiety 7/10, denies depression, denies SI, HI and hallucinations  Patient was medication compliant at   Will CT via q7 minute safety checks

## 2021-07-17 NOTE — NURSING NOTE
Patient requested and received PRN Voltaren gel for c/o pain to the left hand d/t "trigger finger"  Will monitor for medication effectiveness

## 2021-07-17 NOTE — NURSING NOTE
Patient is awake at this time  She c/o lower back pain and b/l hand pain for which she requested and received PRN pain medication  Administered PRN Oxycodone as per order for severe pain  Pain rating was 9/10  Will monitor for medication effectiveness

## 2021-07-17 NOTE — PROGRESS NOTES
C/O" I am doing Better, things get better"    Report from staff regarding this patient received and record reviewed  prior to seeing this patient   Behavior over the last 24 hours:  Stated she is taking meds, and not feeling depressed, not anxious and asked question      Regarding when is getting discharged  Stated slept good, offered no issues  Sleep:good  Appetite:good  Medication side effects:none  ROS:improving   Mental Status Evaluation:  Appearance:  Dressed appropriately average height white woman, friendly    Behavior:  cooperative   Speech:  normal   Mood:  Slightly anxious   Affect:  appropriate     Thought Process:  Goal directed   Thought Content:  normal   Perceptual Disturbances: Denied AV hallucination   Risk Potential: NO REMEDIOS    Sensorium:  normal   Cognition:  intact   Consciousness:  Alert, OX3   Attention: Fair   Insight:  limited   Judgment: limited   Gait/Station: With in normal range   Motor Activity: No abnormal movement     Progress Toward Goals: working on current treatment goals, no changes  Made in treatment plan   Recommended Treatment: Continue with group therapy, milieu therapy and occupational therapy  Risks, benefits and possible side effects of Medications:   Risks, benefits, and possible side effects of medications explained to patient and patient verbalizes understanding        Medications:   current meds:   Current Facility-Administered Medications   Medication Dose Route Frequency    acetaminophen (TYLENOL) tablet 650 mg  650 mg Oral Q4H PRN    acetaminophen (TYLENOL) tablet 650 mg  650 mg Oral Q4H PRN    aluminum-magnesium hydroxide-simethicone (MYLANTA) oral suspension 30 mL  30 mL Oral Q4H PRN    amLODIPine (NORVASC) tablet 5 mg  5 mg Oral Daily    apixaban (ELIQUIS) tablet 5 mg  5 mg Oral BID    [START ON 8/4/2021] cholecalciferol (VITAMIN D3) tablet 1,000 Units  1,000 Units Oral Daily    Diclofenac Sodium (VOLTAREN) 1 % topical gel 2 g  2 g Topical 4x Daily PRN    ergocalciferol (VITAMIN D2) capsule 50,000 Units  50,000 Units Oral Weekly    gabapentin (NEURONTIN) capsule 300 mg  300 mg Oral TID    hydrOXYzine HCL (ATARAX) tablet 25 mg  25 mg Oral Q6H PRN Max 4/day    hydrOXYzine HCL (ATARAX) tablet 50 mg  50 mg Oral Q6H PRN    isosorbide mononitrate (IMDUR) 24 hr tablet 30 mg  30 mg Oral Daily    lidocaine (LIDODERM) 5 % patch 3 patch  3 patch Topical Daily    loperamide (IMODIUM) capsule 2 mg  2 mg Oral TID PRN    LORazepam (ATIVAN) tablet 0 5 mg  0 5 mg Oral Q8H PRN    melatonin tablet 3 mg  3 mg Oral HS    metoprolol tartrate (LOPRESSOR) tablet 25 mg  25 mg Oral Q12H ALISA    OLANZapine (ZyPREXA) IM injection 5 mg  5 mg Intramuscular Q6H PRN Max 4/day    OLANZapine (ZyPREXA) tablet 10 mg  10 mg Oral HS    OLANZapine (ZyPREXA) tablet 2 5 mg  2 5 mg Oral Q6H PRN Max 4/day    OLANZapine (ZyPREXA) tablet 5 mg  5 mg Oral Q6H PRN Max 4/day    OLANZapine (ZyPREXA) tablet 5 mg  5 mg Oral Daily    ondansetron (ZOFRAN-ODT) dispersible tablet 4 mg  4 mg Oral Q6H PRN    oxyCODONE (ROXICODONE) immediate release tablet 10 mg  10 mg Oral Q4H PRN    pantoprazole (PROTONIX) EC tablet 40 mg  40 mg Oral Early Morning    polyethylene glycol (MIRALAX) packet 17 g  17 g Oral Daily PRN    pravastatin (PRAVACHOL) tablet 20 mg  20 mg Oral Daily With Dinner    prazosin (MINIPRESS) capsule 2 mg  2 mg Oral HS    risperiDONE (RisperDAL M-TAB) disintegrating tablet 0 5 mg  0 5 mg Oral Q6H PRN Max 4/day    senna-docusate sodium (SENOKOT S) 8 6-50 mg per tablet 1 tablet  1 tablet Oral Daily PRN    traZODone (DESYREL) tablet 50 mg  50 mg Oral HS PRN    venlafaxine (EFFEXOR-XR) 24 hr capsule 150 mg  150 mg Oral Daily     Labs: NA    Assessment, Diagnosis  and Plan: continue with current meds and goals, F/U tomorrow    Counseling / Coordination of Care  Total floor / unit time spent today20 minutes  minutes   Greater than 50% of total time was spent with the patient and / or family counseling and / or coordination of care  A description of the counseling / coordination of care:      Lin Ro MD

## 2021-07-17 NOTE — NURSING NOTE
Patient came to nurses station and requested medications for increased anxiety  Patient stated she felt "her heart was pounding" and she could not take it anymore  Atarax was offered and patient declined and stated she wanted Ativan PRN and stated atarax does not work for her  Patient rated her anxiety as "very high" will continue to monitor for effectiveness  q 7 mins checks in place for safety

## 2021-07-17 NOTE — PLAN OF CARE
Problem: Ineffective Coping  Goal: Identifies healthy coping skills  Outcome: Progressing  Goal: Participates in unit activities  Description: Interventions:  - Provide therapeutic environment   - Provide required programming   - Redirect inappropriate behaviors   Outcome: Progressing  Goal: Patient/Family participate in treatment and DC plans  Description: Interventions:  - Provide therapeutic environment  Outcome: Progressing     Problem: Depression  Goal: Treatment Goal: Demonstrate behavioral control of depressive symptoms, verbalize feelings of improved mood/affect, and adopt new coping skills prior to discharge  Outcome: Progressing  Goal: Refrain from self-neglect  Outcome: Progressing  Goal: Complete daily ADLs, including personal hygiene independently, as able  Description: Interventions:  - Observe, teach, and assist patient with ADLS  -  Monitor and promote a balance of rest/activity, with adequate nutrition and elimination   Outcome: Progressing     Problem: Anxiety  Goal: Anxiety is at manageable level  Description: Interventions:  - Assess and monitor patient's anxiety level  - Monitor for signs and symptoms (heart palpitations, chest pain, shortness of breath, headaches, nausea, feeling jumpy, restlessness, irritable, apprehensive)  - Collaborate with interdisciplinary team and initiate plan and interventions as ordered    - [place] patient to unit/surroundings  - Explain treatment plan  - Encourage participation in care  - Encourage verbalization of concerns/fears  - Identify coping mechanisms  - Assist in developing anxiety-reducing skills  - Administer/offer alternative therapies  - Limit or eliminate stimulants  Outcome: Progressing

## 2021-07-17 NOTE — NURSING NOTE
Patient compliant with medications and meals  Pleasant and cooperative  Patient requested pain medications with morning medications for back pain  Also requested Voltaren gel for hand pain  Patient denies any SI/HI  Patient reports anxiety, denies any depression  No other concerns or problems reported this shift  Q 7 mins checks in place for safety  Will continue to monitor for behaviors and changes

## 2021-07-17 NOTE — NURSING NOTE
Minimal relief obtained from PRN medications  She has remained awake intermittently throughout the entire overnight period  Will CTM  Q7 minute safety checks

## 2021-07-17 NOTE — PROGRESS NOTES
Progress Note - Elisabet Multani 77 y o  female MRN: [id number]    Unit/Bed#: Sheri Correia 201-01 Encounter: [id number]      Assessment:  1  Cardiac with history of hypertension, dyslipidemia, CHF, atrial fibrillation, coronary artery disease status post pacemaker placement   Currently stable on metoprolol 25 mg b i d  Amlodipine 5 mg daily Imdur 30 mg daily Pravachol 20 mg daily and Eliquis 5 mg b i d   2  Neuropathy   Gabapentin 300 mg t i d    3  GERD   Stable on Protonix 40 mg daily  4  DJD/osteoarthritis  currently on Lidoderm patch as well as oxycodone for severe pain  Stable  5  Gait abnormality   PT OT eval and treat  6  Vitamin-D deficiency  supplemented  7  Vitamin B12 deficiency  Supplemented  8  Left hand trigger finger index and middle  to be evaluated as an outpatient for definitive therapy     Plan:  See above    Subjective:   No new subjective complaints    Objective:     Vitals: Blood pressure 128/89, pulse 65, temperature 97 5 °F (36 4 °C), temperature source Temporal, resp  rate 16, height 5' 3" (1 6 m), weight 104 kg (228 lb 6 3 oz), SpO2 96 %  ,Body mass index is 40 46 kg/m²        Intake/Output Summary (Last 24 hours) at 7/17/2021 1804  Last data filed at 7/17/2021 1703  Gross per 24 hour   Intake 960 ml   Output --   Net 960 ml       Physical Exam: /89 (BP Location: Right arm)   Pulse 65   Temp 97 5 °F (36 4 °C) (Temporal)   Resp 16   Ht 5' 3" (1 6 m)   Wt 104 kg (228 lb 6 3 oz)   SpO2 96%   BMI 40 46 kg/m²     General Appearance:    Alert, cooperative, no distress, appears stated age   Head:    Normocephalic, without obvious abnormality, atraumatic                   Neck:   Supple, symmetrical, trachea midline, no adenopathy;     thyroid:  no enlargement/tenderness/nodules; no carotid    bruit or JVD   Back:     Symmetric, no curvature, ROM normal, no CVA tenderness   Lungs:     Clear to auscultation bilaterally, respirations unlabored   Chest Wall:    No tenderness or deformity Heart:    Regular rate and rhythm, S1 and S2 normal, no murmur, rub   or gallop       Abdomen:     Soft, non-tender, bowel sounds active all four quadrants,     no masses, no organomegaly           Extremities:   Extremities normal, atraumatic, no cyanosis or edema   Pulses:   2+ and symmetric all extremities   Skin:   Skin color, texture, turgor normal, no rashes or lesions   Lymph nodes:   Cervical, supraclavicular, and axillary nodes normal       Invasive Devices     None                 Lab, Imaging and other studies: I have personally reviewed pertinent reports

## 2021-07-18 PROCEDURE — 99232 SBSQ HOSP IP/OBS MODERATE 35: CPT | Performed by: NURSE PRACTITIONER

## 2021-07-18 RX ADMIN — Medication 2 G: at 05:53

## 2021-07-18 RX ADMIN — PRAVASTATIN SODIUM 20 MG: 20 TABLET ORAL at 16:05

## 2021-07-18 RX ADMIN — GABAPENTIN 300 MG: 300 CAPSULE ORAL at 08:07

## 2021-07-18 RX ADMIN — VENLAFAXINE HYDROCHLORIDE 150 MG: 150 CAPSULE, EXTENDED RELEASE ORAL at 08:07

## 2021-07-18 RX ADMIN — PHENYTOIN 2 MG: 125 SUSPENSION ORAL at 21:32

## 2021-07-18 RX ADMIN — PANTOPRAZOLE SODIUM 40 MG: 40 TABLET, DELAYED RELEASE ORAL at 05:53

## 2021-07-18 RX ADMIN — METOPROLOL TARTRATE 25 MG: 25 TABLET, FILM COATED ORAL at 08:07

## 2021-07-18 RX ADMIN — GABAPENTIN 300 MG: 300 CAPSULE ORAL at 16:04

## 2021-07-18 RX ADMIN — GABAPENTIN 300 MG: 300 CAPSULE ORAL at 21:32

## 2021-07-18 RX ADMIN — OLANZAPINE 10 MG: 10 TABLET, FILM COATED ORAL at 21:32

## 2021-07-18 RX ADMIN — OXYCODONE HYDROCHLORIDE 10 MG: 10 TABLET ORAL at 05:53

## 2021-07-18 RX ADMIN — OXYCODONE HYDROCHLORIDE 10 MG: 10 TABLET ORAL at 21:32

## 2021-07-18 RX ADMIN — LORAZEPAM 0.5 MG: 0.5 TABLET ORAL at 11:50

## 2021-07-18 RX ADMIN — METOPROLOL TARTRATE 25 MG: 25 TABLET, FILM COATED ORAL at 21:32

## 2021-07-18 RX ADMIN — OXYCODONE HYDROCHLORIDE 10 MG: 10 TABLET ORAL at 16:04

## 2021-07-18 RX ADMIN — MELATONIN 3 MG: at 21:32

## 2021-07-18 RX ADMIN — OLANZAPINE 5 MG: 5 TABLET, FILM COATED ORAL at 08:07

## 2021-07-18 RX ADMIN — APIXABAN 5 MG: 5 TABLET, FILM COATED ORAL at 17:46

## 2021-07-18 RX ADMIN — ISOSORBIDE MONONITRATE 30 MG: 30 TABLET, EXTENDED RELEASE ORAL at 08:07

## 2021-07-18 RX ADMIN — Medication 2 G: at 19:48

## 2021-07-18 RX ADMIN — AMLODIPINE BESYLATE 5 MG: 5 TABLET ORAL at 08:07

## 2021-07-18 RX ADMIN — APIXABAN 5 MG: 5 TABLET, FILM COATED ORAL at 08:07

## 2021-07-18 NOTE — NURSING NOTE
Patient complaint with medications and meals  Patient rated her anxiety 6 out of 10 this shift and denies any depression  Patient denies any SI/HI  Patient social with peers  Rated her pain 4 out of 10 after pain medication this am around 6 am  No other concerns or problems reported at this time  Q 7 mins checks in place for safety  Will continue to monitor for behaviors and changes

## 2021-07-18 NOTE — PLAN OF CARE
Problem: Ineffective Coping  Goal: Identifies healthy coping skills  Outcome: Progressing  Goal: Participates in unit activities  Description: Interventions:  - Provide therapeutic environment   - Provide required programming   - Redirect inappropriate behaviors   Outcome: Progressing  Goal: Patient/Family participate in treatment and DC plans  Description: Interventions:  - Provide therapeutic environment  Outcome: Progressing     Problem: Depression  Goal: Treatment Goal: Demonstrate behavioral control of depressive symptoms, verbalize feelings of improved mood/affect, and adopt new coping skills prior to discharge  Outcome: Progressing  Goal: Refrain from self-neglect  Outcome: Progressing  Goal: Complete daily ADLs, including personal hygiene independently, as able  Description: Interventions:  - Observe, teach, and assist patient with ADLS  -  Monitor and promote a balance of rest/activity, with adequate nutrition and elimination   Outcome: Progressing

## 2021-07-18 NOTE — PROGRESS NOTES
Progress Note - Behavioral Health   Bettie Justice 77 y o  female MRN: [id number]  Unit/Bed#: [facility] 201-01 Encounter: [id number]    Assessment/Plan   Principal Problem:    MDD (major depressive disorder), recurrent, severe, with psychosis ([address] )  Active Problems:    Essential hypertension    Chronic hand pain, right    Chronic low back pain    Osteoarthritis of lumbar spine with myelopathy    Vitamin D deficiency    CAD (coronary artery disease)      Subjective:Patient was seen today for continuation of care, records reviewed and  patient was discussed with the morning case review team     Al Cabrera is seen today for psychiatric follow-up  She is friendly, calm, and cooperative during conversation  She reports that she is awaiting discharge, and staff is working out her financials for her  She denies any depression or anxiety at this time  However, she does state that she gets waves of anxiety that come on quickly  When this happens she becomes shaky and can not get words out  When this happens, she uses coping skills or takes an Ativan which is effective  She states that she is hopeful for discharge soon, as she misses her family  She denies suicidal and homicidal ideation  She denies auditory and visual hallucinations  Appetite and sleep are good  She also reports that her nightmares have ceased      Psychiatric Review of Systems:    Sleep: normal  Appetite: normal  Medication side effects: No   ROS: no complaints, all other systems are negative    Vitals:  Vitals:    07/18/21 0700   BP: 136/84   Pulse: 67   Resp: 16   Temp: (!) 96 8 °F (36 °C)   SpO2: 93%       Mental Status Evaluation:    Appearance:  adequate grooming, dressed in hospital attire   Behavior:  pleasant, cooperative, calm   Speech:  normal rate, normal volume, normal pitch   Mood:  euthymic   Affect:  normal range and intensity   Thought Process:  goal directed, linear   Associations: intact associations   Thought Content:  no overt delusions   Perceptual Disturbances: none   Risk Potential: Suicidal ideation - None  Homicidal ideation - None  Potential for aggression - No   Sensorium:  oriented to person, place and time/date   Memory:  recent and remote memory grossly intact   Consciousness:  alert and awake   Attention: attention span and concentration are age appropriate   Insight:  fair   Judgment: fair   Gait/Station: normal gait/station   Motor Activity: no abnormal movements     Laboratory results:  I have personally reviewed all pertinent laboratory/tests results  Progress Toward Goals:     No change  Continuing to work on financial aspect of patient's admission to [facility]      Recommended Treatment:     All current active medications have been reviewed  Encourage group therapy, milieu therapy and occupational therapy  Behavioral Health checks every 7 minutes  Await placement  Continue current medications:  Current Facility-Administered Medications   Medication Dose Route Frequency Provider Last Rate    acetaminophen  650 mg Oral Q4H PRN Kaia Fails, CRNP      acetaminophen  650 mg Oral Q4H PRN Kaia Fails, CRNP      aluminum-magnesium hydroxide-simethicone  30 mL Oral Q4H PRN Kaia Fails, CRNP      amLODIPine  5 mg Oral Daily Kaia Fails, CRNP      apixaban  5 mg Oral BID Kaia Fails, CRNP      [START ON 8/4/2021] cholecalciferol  1,000 Units Oral Daily Severa Cone, MD      Diclofenac Sodium  2 g Topical 4x Daily PRN Severa Cone, MD      ergocalciferol  50,000 Units Oral Weekly Severa Cone, MD      gabapentin  300 mg Oral TID Keke Montalvo MD      hydrOXYzine HCL  25 mg Oral Q6H PRN Max 4/day Kaia Fails, CRNP      hydrOXYzine HCL  50 mg Oral Q6H PRN Kaia Fails, CRNP      isosorbide mononitrate  30 mg Oral Daily Kaia Fails, CRNP      lidocaine  3 patch Topical Daily Severa Cone, MD      loperamide  2 mg Oral TID PRN Flor Grande MD      LORazepam  0 5 mg Oral Q8H PRN Kaia Fails, CRNP      melatonin  3 mg Oral HS Bettye Mosqueda, JOSE      metoprolol tartrate  25 mg Oral Q12H [facility] Delcia Panda, JOSE      OLANZapine  5 mg Intramuscular Q6H PRN Max 4/day Gildaa Panda, JOSE      OLANZapine  10 mg Oral HS Loreta Puckett MD      OLANZapine  2 5 mg Oral Q6H PRN Max 4/day Delcia Panda, CHINYERENP      OLANZapine  5 mg Oral Q6H PRN Max 4/day Darrickcia Panda, JOSE      OLANZapine  5 mg Oral Daily Loreta Puckett MD      ondansetron  4 mg Oral Q6H PRN Scar Leonardo MD      oxyCODONE  10 mg Oral Q4H PRN Doreen Dawkins PA-C      pantoprazole  40 mg Oral Early Morning Delcia Panda, JOSE      polyethylene glycol  17 g Oral Daily PRN Delcia Panda, JOSE      [facility]ravastatin  [facility]      prazosin  2 mg Oral HS Delcia Panda, JOSE      risperiDONE  0 5 mg Oral Q6H PRN Max 4/day Delcia Panda, JOSE      senna-docusate sodium  1 tablet Oral Daily PRN Delcia Panda, JOSE      traZODone  50 mg Oral HS PRN Gildaa Panda, JOSE      venlafaxine  150 mg Oral Daily Loreta Puckett MD         Risks / Benefits of Treatment:     Risks, benefits, and possible side effects of medications explained to patient and patient verbalizes understanding and agreement for treatment  Counseling / Coordination of Care:     Patient's progress reviewed with nursing staff  Medications, treatment progress and treatment plan reviewed with patient  Supportive counseling provided to the patient            JOSE Moctezuma

## 2021-07-18 NOTE — NURSING NOTE
Patient came to nurses station was tearful and stated she had increased anxiety and felt "like jumping out of her skin" patient rated her anxiety as "very high" requested ativan PRN  Ativan given per order, jefferson score 25  Will continue to monitor  Q 7 mins checks in place for safety

## 2021-07-18 NOTE — NURSING NOTE
E [id number]     Pt present in the milieu; Affect brightens on approach  Mood remains somewhat labile; theatrical displays when requesting PRN medications  No acute behavioral changes noted  Q 7 min checks ongoing

## 2021-07-18 NOTE — PROGRESS NOTES
Progress Note - Jay Herron 77 y o  female MRN: [id number]    Unit/Bed#: [address] 201-01 Encounter: [id number]      Assessment:  1  Cardiac with history of hypertension, dyslipidemia, CHF, atrial fibrillation, coronary artery disease status post pacemaker placement   Currently stable on metoprolol 25 mg b i d  Amlodipine 5 mg daily Imdur 30 mg daily Pravachol 20 mg daily and Eliquis 5 mg b i d   2  Neuropathy   Gabapentin 300 mg t i d    3  GERD   Stable on Protonix 40 mg daily  4  DJD/osteoarthritis   currently on Lidoderm patch as well as oxycodone for severe pain  Stable  5  Gait abnormality   PT OT eval and treat  6  Vitamin-D deficiency    supplemented  7  Vitamin B12 deficiency  Supplemented  8  Left hand trigger finger index and middle    to be evaluated as an outpatient for definitive therapy        Plan:  As above    Subjective:   No subjective complaint other than anxiety which was managed by our psychiatric team    Objective:     Vitals: Blood pressure 128/61, pulse 73, temperature (!) 97 2 °F (36 2 °C), temperature source Temporal, resp  rate 18, height 5' 3" (1 6 m), weight 104 kg (228 lb 6 3 oz), SpO2 96 %  ,Body mass index is 40 46 kg/m²        Intake/Output Summary (Last 24 hours) at 7/18/2021 1934  Last data filed at 7/18/2021 0902  Gross per 24 hour   Intake 705 ml   Output --   Net 705 ml       Physical Exam: /61 (BP Location: Left arm)   Pulse 73   Temp (!) 97 2 °F (36 2 °C) (Temporal)   Resp 18   Ht 5' 3" (1 6 m)   Wt 104 kg (228 lb 6 3 oz)   SpO2 96%   BMI 40 46 kg/m²     General Appearance:    Alert, cooperative, no distress, appears stated age   Head:    Normocephalic, without obvious abnormality, atraumatic                   Neck:   Supple, symmetrical, trachea midline, no adenopathy;     thyroid:  no enlargement/tenderness/nodules; no carotid    bruit or JVD   Back:     Symmetric, no curvature, ROM normal, no CVA tenderness   Lungs:     Clear to auscultation bilaterally, respirations unlabored   Chest Wall:    No tenderness or deformity    Heart:    Regular rate and rhythm, S1 and S2 normal, no murmur, rub   or gallop       Abdomen:     Soft, non-tender, bowel sounds active all four quadrants,     no masses, no organomegaly           Extremities:   Extremities normal, atraumatic, no cyanosis or edema   Pulses:   2+ and symmetric all extremities   Skin:   Skin color, texture, turgor normal, no rashes or lesions   Lymph nodes:   Cervical, supraclavicular, and axillary nodes normal            Invasive Devices     None                 Lab, Imaging and other studies: I have personally reviewed pertinent reports

## 2021-07-18 NOTE — NURSING NOTE
N [id number]     Pt had interrupted sleep  No acute behavioral outburst noted  Q 7 min checks ongoing

## 2021-07-19 PROCEDURE — 99232 SBSQ HOSP IP/OBS MODERATE 35: CPT | Performed by: NURSE PRACTITIONER

## 2021-07-19 PROCEDURE — 97530 THERAPEUTIC ACTIVITIES: CPT

## 2021-07-19 RX ADMIN — OLANZAPINE 10 MG: 10 TABLET, FILM COATED ORAL at 21:22

## 2021-07-19 RX ADMIN — APIXABAN 5 MG: 5 TABLET, FILM COATED ORAL at 17:37

## 2021-07-19 RX ADMIN — Medication 2 G: at 16:10

## 2021-07-19 RX ADMIN — GABAPENTIN 300 MG: 300 CAPSULE ORAL at 21:22

## 2021-07-19 RX ADMIN — OLANZAPINE 5 MG: 5 TABLET, FILM COATED ORAL at 08:24

## 2021-07-19 RX ADMIN — MELATONIN 3 MG: at 21:22

## 2021-07-19 RX ADMIN — OXYCODONE HYDROCHLORIDE 10 MG: 10 TABLET ORAL at 05:42

## 2021-07-19 RX ADMIN — PANTOPRAZOLE SODIUM 40 MG: 40 TABLET, DELAYED RELEASE ORAL at 05:42

## 2021-07-19 RX ADMIN — VENLAFAXINE HYDROCHLORIDE 150 MG: 150 CAPSULE, EXTENDED RELEASE ORAL at 08:24

## 2021-07-19 RX ADMIN — OXYCODONE HYDROCHLORIDE 10 MG: 10 TABLET ORAL at 21:22

## 2021-07-19 RX ADMIN — AMLODIPINE BESYLATE 5 MG: 5 TABLET ORAL at 08:24

## 2021-07-19 RX ADMIN — METOPROLOL TARTRATE 25 MG: 25 TABLET, FILM COATED ORAL at 21:22

## 2021-07-19 RX ADMIN — PHENYTOIN 2 MG: 125 SUSPENSION ORAL at 21:22

## 2021-07-19 RX ADMIN — METOPROLOL TARTRATE 25 MG: 25 TABLET, FILM COATED ORAL at 08:24

## 2021-07-19 RX ADMIN — GABAPENTIN 300 MG: 300 CAPSULE ORAL at 16:09

## 2021-07-19 RX ADMIN — LORAZEPAM 0.5 MG: 0.5 TABLET ORAL at 16:10

## 2021-07-19 RX ADMIN — APIXABAN 5 MG: 5 TABLET, FILM COATED ORAL at 08:24

## 2021-07-19 RX ADMIN — GABAPENTIN 300 MG: 300 CAPSULE ORAL at 08:24

## 2021-07-19 RX ADMIN — PRAVASTATIN SODIUM 20 MG: 20 TABLET ORAL at 16:09

## 2021-07-19 RX ADMIN — ISOSORBIDE MONONITRATE 30 MG: 30 TABLET, EXTENDED RELEASE ORAL at 08:24

## 2021-07-19 NOTE — PROGRESS NOTES
Progress Note - Behavioral Health   Dana Pedro 77 y o  female MRN: [id number]  Unit/Bed#: Brigida Beal 201-01 Encounter: [id number]    Assessment/Plan   Principal Problem:    MDD (major depressive disorder), recurrent, severe, with psychosis ([address] )  Active Problems:    Essential hypertension    Chronic hand pain, right    Chronic low back pain    Osteoarthritis of lumbar spine with myelopathy    Vitamin D deficiency    CAD (coronary artery disease)      Behavior over the last 24 hours:  unchanged  Sleep: normal  Appetite: normal  Medication side effects: No  ROS: no complaints and All other systems negative for acute change     Bill Villanueva was seen today for psychiatric follow-up  Patient was calm and cooperative during encounter  Endorses frustration regarding her length of stay and states,what is taking the county so long?" Redirection effective  Patient reports intermittent anxiety and denies any depression  She no longer endorses AVH or nightmares and is sleeping uninterrupted throughout the night  Her appetite remains adequate and she is compliant with psychotropic regimen  Bill Villanueva is often visible in the milieu, social with peers, and attends and participates in groups appropriately      Mental Status Evaluation:  Appearance:  age appropriate, casually dressed and Resting in bed   Behavior:  Cooperative   Speech:  loud   Mood:  Mildly anxious   Affect:  normal and Irritable edge, redirectable   Thought Process:  goal directed   Thought Content:  No overt delusions   Perceptual Disturbances: None   Risk Potential: Suicidal Ideations none  Homicidal Ideations none  Potential for Aggression No   Sensorium:  person, place, time/date and situation   Memory:  recent and remote memory grossly intact   Consciousness:  alert and awake    Attention: attention span and concentration were age appropriate   Insight:  limited   Judgment: limited   Gait/Station: normal gait/station and normal balance   Motor Activity: no abnormal movements     Progress Toward Goals:  No change  Will continue with current psychotropic regimen; patient tolerating well  Continuing to await and may transition and then can be discharged to [facility]  No discharge date at this time  Recommended Treatment: Continue with group therapy, milieu therapy and occupational therapy  Risks, benefits and possible side effects of Medications:   Risks, benefits, and possible side effects of medications explained to patient and patient verbalizes understanding        Medications:   all current active meds have been reviewed, continue current psychiatric medications and current meds:   Current Facility-Administered Medications   Medication Dose Route Frequency    acetaminophen (TYLENOL) tablet 650 mg  650 mg Oral Q4H PRN    acetaminophen (TYLENOL) tablet 650 mg  650 mg Oral Q4H PRN    aluminum-magnesium hydroxide-simethicone (MYLANTA) oral suspension 30 mL  30 mL Oral Q4H PRN    amLODIPine (NORVASC) tablet 5 mg  5 mg Oral Daily    apixaban (ELIQUIS) tablet 5 mg  5 mg Oral BID    [START ON 8/4/2021] cholecalciferol (VITAMIN D3) tablet 1,000 Units  1,000 Units Oral Daily    Diclofenac Sodium (VOLTAREN) 1 % topical gel 2 g  2 g Topical 4x Daily PRN    ergocalciferol (VITAMIN D2) capsule 50,000 Units  50,000 Units Oral Weekly    gabapentin (NEURONTIN) capsule 300 mg  300 mg Oral TID    hydrOXYzine HCL (ATARAX) tablet 25 mg  25 mg Oral Q6H PRN Max 4/day    hydrOXYzine HCL (ATARAX) tablet 50 mg  50 mg Oral Q6H PRN    isosorbide mononitrate (IMDUR) 24 hr tablet 30 mg  30 mg Oral Daily    loperamide (IMODIUM) capsule 2 mg  2 mg Oral TID PRN    LORazepam (ATIVAN) tablet 0 5 mg  0 5 mg Oral Q8H PRN    melatonin tablet 3 mg  3 mg Oral HS    metoprolol tartrate (LOPRESSOR) tablet 25 mg  25 mg Oral Q12H ALIAS    OLANZapine (ZyPREXA) IM injection 5 mg  5 mg Intramuscular Q6H PRN Max 4/day    OLANZapine (ZyPREXA) tablet 10 mg  10 mg Oral HS    OLANZapine (ZyPREXA) tablet 2 5 mg  2 5 mg Oral Q6H PRN Max 4/day    OLANZapine (ZyPREXA) tablet 5 mg  5 mg Oral Q6H PRN Max 4/day    OLANZapine (ZyPREXA) tablet 5 mg  5 mg Oral Daily    ondansetron (ZOFRAN-ODT) dispersible tablet 4 mg  4 mg Oral Q6H PRN    oxyCODONE (ROXICODONE) immediate release tablet 10 mg  10 mg Oral Q4H PRN    pantoprazole (PROTONIX) EC tablet 40 mg  40 mg Oral Early Morning    polyethylene glycol (MIRALAX) packet 17 g  17 g Oral Daily PRN    pravastatin (PRAVACHOL) tablet 20 mg  20 mg Oral Daily With Dinner    prazosin (MINIPRESS) capsule 2 mg  2 mg Oral HS    risperiDONE (RisperDAL M-TAB) disintegrating tablet 0 5 mg  0 5 mg Oral Q6H PRN Max 4/day    senna-docusate sodium (SENOKOT S) 8 6-50 mg per tablet 1 tablet  1 tablet Oral Daily PRN    traZODone (DESYREL) tablet 50 mg  50 mg Oral HS PRN    venlafaxine (EFFEXOR-XR) 24 hr capsule 150 mg  150 mg Oral Daily     Labs: I have personally reviewed all pertinent laboratory/tests results  Counseling / Coordination of Care  Total floor / unit time spent today 25 minutes  Greater than 50% of total time was spent with the patient and / or family counseling and / or coordination of care

## 2021-07-19 NOTE — NURSING NOTE
Patient was observed to be in the community this evening  Attended snack time  Speech is loud but appropriate in content  She requested and received PRN Voltaren Gel for c/o 7/10 right hand pain which provided relief  She endorses anxiety with a rating of 8/10, denies depression, denies SI, HI and hallucinations  She showered tonight and changed clothes  No acute behaviors observed  Medication compliant at HS  She also requested and received PRN Oxycodone for c/o 8/10 lower back pain  Will CTM via q7 minute safety checks

## 2021-07-19 NOTE — SOCIAL WORK
CM called and spoke with Ms Lorelee Opitz with [employer] ([phone]) inquiring if she received the requested paperwork that was emailed to her last week  Ms Lorelee Opitz reported the email was received but unable to be opened  Ms Lorelee Opitz asked that this CM send the bank statement again to [email or web address]  CM did as requested and instructed the GO to call this CM with any questions or concerns  CM will continue to follow patient's progress and assist with discharge planning needs

## 2021-07-19 NOTE — OCCUPATIONAL THERAPY NOTE
[address] Progress Note     Patient Name: Concetta CARLIN'S Date: 7/19/2021  Problem List  Principal Problem:    MDD (major depressive disorder), recurrent, severe, with psychosis ([address] )  Active Problems:    Essential hypertension    Chronic hand pain, right    Chronic low back pain    Osteoarthritis of lumbar spine with myelopathy    Vitamin D deficiency    CAD (coronary artery disease)          07/19/21 1325   OT Last Visit   OT Visit Date 07/19/21   Note Type   Note Type Treatment   Restrictions/Precautions   Weight Bearing Precautions Per Order No   Other Precautions Pain   General   Response to Previous Treatment Patient with no complaints from previous session   Lifestyle   Autonomy Patient reporting being independent with ADLs, ambulatory with no AD and lives alone in a one level apartment    Reciprocal Relationships grandson and friend    Service to Others retired    Pain Assessment   Pain Assessment Tool 0-10   Pain Score 4   Pain Location/Orientation Location: Back   Pain Onset/Description Onset: Ongoing; Descriptor: [address] Pain Intervention(s) Ambulation/increased activity;Repositioned   ADL   Where Assessed   (PCA reports that pt is independent with all ADLs )   Bed Mobility   Additional Comments DNT bed mobility: pt seated on couch upon arrival and seated in chair at end of session    Transfers   Sit to Stand 7  Independent   Stand to Sit 7  Independent   Functional Mobility   Functional Mobility 7  Independent   Additional Comments Pt ambulated in hallway with no overt LOB or SOB   Additional items   (Pt ambulated with no AD)   Cognition   Overall Cognitive Status [facility]   Arousal/Participation Alert; Responsive; Cooperative   Attention Attends with cues to redirect   Orientation Level Oriented X4   Memory Decreased recall of precautions   Following Commands Follows one step commands without difficulty   Comments Pt agreeable to OT session    Additional Activities   Additional Activities Comments OT Order received for compression gloves  OT department does not have compression gloves in inpatient setting  RN made aware  Provided patient with foam resistant block  Educated patient on foam block purpose and provided demonstration  Instructed patient to complete mild foam squeezes throughout the day  Patient demonstrated good understanding and appreciative of block  RN made aware  Activity Tolerance   Activity Tolerance Patient tolerated treatment well   Medical Staff Made Aware RN made aware of outcomes    Assessment   Assessment Patient participated in Skilled OT session this date with interventions consisting of therapeutic exercise to: increase functional use of BUEs, increase BUE muscle strength  and  therapeutic activities to: increase activity tolerance  Patient agreeable to OT treatment session, upon arrival patient was found alert, responsive  and in no apparent distress  Patient completed functional transfers and mobility independently  Patient and PCA reported that patient has been completing all ADLs independently  OT Order received for compression gloves  OT department does not have compression gloves in inpatient setting  Provided patient with foam resistant block  Educated patient on foam block purpose and provided demonstration  Instructed patient to complete mild foam squeezes throughout the day  Patient demonstrated good understanding and appreciative of block  RN made aware  In comparison to previous session, patient with improvements in balance and independence with ADLs  Patient requiring ocassional safety reminders  Patient presents with functional use of BUEs, with intact prehension and fine motor coordination, and symmetrical muscle strength  Patient appears to be functioning at baseline, no further Acute OT needs identified at this time to warrant OT services  D/C OT and from OT standpoint, recommendation at time of d/c would be No rehabilitation needs     Plan Treatment Interventions Patient/family training   Goal Expiration Date   (Date   Pt has met all goals  Will d/c OT orders  )   OT Treatment Day 1   Recommendation   OT Discharge Recommendation No rehabilitation needs   OT - OK to Discharge Yes   Additional Comments  The patient's raw score on the AM-PAC Daily Activity inpatient short form is 24, standardized score is 57 54, greater than 39 4  Patients at this level are likely to benefit from discharge to home  Please refer to the recommendation of the Occupational Therapist for safe discharge planning     AM-PAC Daily Activity Inpatient   Lower Body Dressing 4   Bathing 4   Toileting 4   Upper Body Dressing 4   Grooming 4   Eating 4   Daily Activity Raw Score 24   Daily Activity Standardized Score (Calc for Raw Score >=11) 57 54   AM-PAC Applied Cognition Inpatient   Following a Speech/Presentation 4   Understanding Ordinary Conversation 4   Taking Medications 3   Remembering Where Things Are Placed or Put Away 3   Remembering List of 4-5 Errands 3   Taking Care of Complicated Tasks 3   Applied Cognition Raw Score 20   Applied Cognition Standardized Score 41 76     Vidhi Sheriff, OT

## 2021-07-19 NOTE — PROGRESS NOTES
07/19/21   Team Meeting   Meeting Type Daily Rounds   Team Members Present   Team Members Present Physician;Nurse;;Occupational Therapist   Physician Team Member Dr Brigida Bledsoe MD; JOSE Watkins   Nursing Team Member Herman Tran RN   Care Management Team Member MS Renee, [facility]   OT Team Member Nori Garzon [place]   Patient/Family Present   Patient Present No   Patient's Family Present No   Reports panic attack yesterday, prn  Continues to request prns for pain  Awaiting MA transition and then can go to [facility]

## 2021-07-19 NOTE — NURSING NOTE
Patient complaint with medications and meals  Patient rated her anxiety as "better" this shift  Stated she felt she had a panic attack yesterday  Patient denies any SI/HI  Patient social with peers  Rated her pain 6 out of 10  No other concerns or problems reported at this time  Q 7 mins checks in place for safety   Will continue to monitor for behaviors and changes

## 2021-07-19 NOTE — PROGRESS NOTES
Progress Note - Yanet Lund 77 y o  female MRN: [id number]    Unit/Bed#Jeremy Ybarra 201-01 Encounter: [id number]        Subjective:   Patient seen and examined at bedside after reviewing the chart and discussing the case with the caring staff  Patient examined at bedside  Patient has no acute issues except reports that she had an episode of panic attack yesterday which lasted almost 45 minutes  Patient is going to a personal care home in [facility]  Patient will be requiring all her medications  I reviewed and reconciled patient's problem list and medications  Physical Exam   Vitals: Blood pressure 141/89, pulse 73, temperature (!) 97 4 °F (36 3 °C), temperature source Temporal, resp  rate 18, height 5' 3" (1 6 m), weight 104 kg (228 lb 6 3 oz), SpO2 96 %  ,Body mass index is 40 46 kg/m²  Constitutional: He appears well-developed  HEENT: PERR, EOMI, MMM  Cardiovascular: Normal rate and regular rhythm  Pulmonary/Chest: Effort normal and breath sounds normal    Abdomen: Soft, + BS, NT    Assessment/Plan:  Yanet Lund is a(n) 77y o  year old female with MDD with psychotic symptoms    MEDICAL CLEARANCE  Patient is medically cleared for discharge  All scripts will be sent out for the patient once discharge is finalized to the relevant pharmacy  1  Cardiac with history of hypertension, dyslipidemia, CHF, atrial fibrillation, coronary artery disease status post pacemaker placement  Patient will be continued on metoprolol 25 mg b i d , amlodipine 5 mg daily, Imdur 30 mg daily, Pravachol 20 mg daily and Eliquis 5 mg 2 times daily  2  Neuropathy  Patient is on gabapentin 300 mg 3 times daily  3  GERD  Patient is on Protonix 40 mg daily  4  DJD/osteoarthritis  Patient is on oxycodone 10 mg severe pain p r n  Viridiana Sinks Patient may use heating pad  5  Gait abnormality  PT OT consulted  6  Vitamin-D deficiency    Patient is on vitamin-D bolus doses for 8 weeks followed by vitamin D3 1000 units daily  7  Vitamin B12 deficiency  Patient is on monthly B12 injections  8  Left hand trigger finger index and middle  Patient may apply Voltaren gel over the affected fingers  Spoke with PT who states they do not have compression wrist braces in inpatient setting as patient would need to be fitted for it

## 2021-07-19 NOTE — PLAN OF CARE
Problem: OCCUPATIONAL THERAPY ADULT  Goal: Performs self-care activities at highest level of function for planned discharge setting  See evaluation for individualized goals  Description: Treatment Interventions: ADL retraining, Functional transfer training, UE strengthening/ROM, Endurance training, Patient/family training, Compensatory technique education, Activityengagement          See flowsheet documentation for full assessment, interventions and recommendations  Outcome: Completed  Note: Limitation: Decreased ADL status, Decreased UE strength, Decreased Safe judgement during ADL, Decreased endurance, Decreased self-care trans, Decreased high-level ADLs, Decreased sensation  Prognosis: Good  Assessment: Patient participated in Skilled OT session this date with interventions consisting of therapeutic exercise to: increase functional use of BUEs, increase BUE muscle strength  and  therapeutic activities to: increase activity tolerance  Patient agreeable to OT treatment session, upon arrival patient was found alert, responsive  and in no apparent distress  Patient completed functional transfers and mobility independently  Patient and PCA reported that patient has been completing all ADLs independently  OT Order received for compression gloves  OT department does not have compression gloves in inpatient setting  Provided patient with foam resistant block  Educated patient on foam block purpose and provided demonstration  Instructed patient to complete mild foam squeezes throughout the day  Patient demonstrated good understanding and appreciative of block  RN made aware  In comparison to previous session, patient with improvements in balance and independence with ADLs  Patient requiring ocassional safety reminders  Patient presents with functional use of BUEs, with intact prehension and fine motor coordination, and symmetrical muscle strength   Patient appears to be functioning at baseline, no further Acute OT needs identified at this time to warrant OT services  D/C OT and from OT standpoint, recommendation at time of d/c would be No rehabilitation needs       OT Discharge Recommendation: No rehabilitation needs  OT - OK to Discharge: Yes     Jalen Alves, OT

## 2021-07-20 PROCEDURE — 99232 SBSQ HOSP IP/OBS MODERATE 35: CPT | Performed by: INTERNAL MEDICINE

## 2021-07-20 RX ORDER — BACITRACIN, NEOMYCIN, POLYMYXIN B 400; 3.5; 5 [USP'U]/G; MG/G; [USP'U]/G
1 OINTMENT TOPICAL 2 TIMES DAILY
Status: DISCONTINUED | OUTPATIENT
Start: 2021-07-20 | End: 2021-07-22 | Stop reason: HOSPADM

## 2021-07-20 RX ADMIN — BACITRACIN ZINC, NEOMYCIN, POLYMYXIN B 1 SMALL APPLICATION: 400; 3.5; 5 OINTMENT TOPICAL at 12:02

## 2021-07-20 RX ADMIN — OLANZAPINE 5 MG: 5 TABLET, FILM COATED ORAL at 08:40

## 2021-07-20 RX ADMIN — METOPROLOL TARTRATE 25 MG: 25 TABLET, FILM COATED ORAL at 08:40

## 2021-07-20 RX ADMIN — PRAVASTATIN SODIUM 20 MG: 20 TABLET ORAL at 16:01

## 2021-07-20 RX ADMIN — METOPROLOL TARTRATE 25 MG: 25 TABLET, FILM COATED ORAL at 21:27

## 2021-07-20 RX ADMIN — AMLODIPINE BESYLATE 5 MG: 5 TABLET ORAL at 08:40

## 2021-07-20 RX ADMIN — PHENYTOIN 2 MG: 125 SUSPENSION ORAL at 21:27

## 2021-07-20 RX ADMIN — GABAPENTIN 300 MG: 300 CAPSULE ORAL at 08:40

## 2021-07-20 RX ADMIN — OXYCODONE HYDROCHLORIDE 10 MG: 10 TABLET ORAL at 08:46

## 2021-07-20 RX ADMIN — VENLAFAXINE HYDROCHLORIDE 150 MG: 150 CAPSULE, EXTENDED RELEASE ORAL at 08:40

## 2021-07-20 RX ADMIN — GABAPENTIN 300 MG: 300 CAPSULE ORAL at 16:01

## 2021-07-20 RX ADMIN — Medication 2 G: at 02:49

## 2021-07-20 RX ADMIN — ISOSORBIDE MONONITRATE 30 MG: 30 TABLET, EXTENDED RELEASE ORAL at 08:40

## 2021-07-20 RX ADMIN — BACITRACIN ZINC, NEOMYCIN, POLYMYXIN B 1 SMALL APPLICATION: 400; 3.5; 5 OINTMENT TOPICAL at 18:40

## 2021-07-20 RX ADMIN — OXYCODONE HYDROCHLORIDE 10 MG: 10 TABLET ORAL at 02:45

## 2021-07-20 RX ADMIN — LORAZEPAM 0.5 MG: 0.5 TABLET ORAL at 12:22

## 2021-07-20 RX ADMIN — PANTOPRAZOLE SODIUM 40 MG: 40 TABLET, DELAYED RELEASE ORAL at 06:03

## 2021-07-20 RX ADMIN — APIXABAN 5 MG: 5 TABLET, FILM COATED ORAL at 08:40

## 2021-07-20 RX ADMIN — OLANZAPINE 10 MG: 10 TABLET, FILM COATED ORAL at 21:28

## 2021-07-20 RX ADMIN — MELATONIN 3 MG: at 21:28

## 2021-07-20 RX ADMIN — GABAPENTIN 300 MG: 300 CAPSULE ORAL at 21:28

## 2021-07-20 RX ADMIN — APIXABAN 5 MG: 5 TABLET, FILM COATED ORAL at 17:27

## 2021-07-20 RX ADMIN — OXYCODONE HYDROCHLORIDE 10 MG: 10 TABLET ORAL at 20:25

## 2021-07-20 NOTE — PROGRESS NOTES
Progress Note - Larissa Carlos 77 y o  female MRN: [id number]    Unit/Bed#Les Car[facility] 201-01 Encounter: [id number]        Subjective:   Patient seen and examined at bedside after reviewing the chart and discussing the case with the caring staff  Patient examined at bedside  Patient complains of an abrasion to her right ear and is requesting topical antibiotic  She otherwise has no acute complaints  Patient is going to a personal care home in [address] [place] [address]  Patient will be requiring all her medications  I reviewed and reconciled patient's problem list and medications  Physical Exam   Vitals: Blood pressure 130/79, pulse 63, temperature (!) 97 °F (36 1 °C), temperature source Temporal, resp  rate 18, height 5' 3" (1 6 m), weight 104 kg (228 lb 6 3 oz), SpO2 97 %  ,Body mass index is 40 46 kg/m²  Constitutional: He appears well-developed  HEENT: PERR, EOMI, MMM  Cardiovascular: Normal rate and regular rhythm  Pulmonary/Chest: Effort normal and breath sounds normal    Abdomen: Soft, + BS, NT    Assessment/Plan:  Larissa Carlos is a(n) 77y o  year old female with MDD with psychotic symptoms    MEDICAL CLEARANCE  Patient is medically cleared for discharge  All scripts will be sent out for the patient once discharge is finalized to the relevant pharmacy  1  Cardiac with history of hypertension, dyslipidemia, CHF, atrial fibrillation, coronary artery disease status post pacemaker placement  Patient will be continued on metoprolol 25 mg b i d , amlodipine 5 mg daily, Imdur 30 mg daily, Pravachol 20 mg daily and Eliquis 5 mg 2 times daily  2  Neuropathy  Patient is on gabapentin 300 mg 3 times daily  3  GERD  Patient is on Protonix 40 mg daily  4  DJD/osteoarthritis  Patient is on oxycodone 10 mg severe pain p r n  Gala Lightning Patient may use heating pad  5  Gait abnormality  PT OT consulted  6  Vitamin-D deficiency    Patient is on vitamin-D bolus doses for 8 weeks followed by vitamin D3 1000 units daily  7  Vitamin B12 deficiency  Patient is on monthly B12 injections  8  Left hand trigger finger index and middle  Patient may apply Voltaren gel over the affected fingers  Spoke with PT who states they do not have compression wrist braces in inpatient setting as patient would need to be fitted for it  9  Abrasion of right ear  Patient may apply Neosporin 2 times daily  The patient was discussed with Dr Mitra Mae and he is in agreement with the above note

## 2021-07-20 NOTE — NURSING NOTE
Patient observed sleeping during most Q 7 minute safety checks (second portion of shift)  Awake once due to pain (see previous note)  Medication given earlier was effective for relief  Safety maintained via clutter-free environment, ID band, and given non-skid socks  No suicidal ideations, acting out or homicidal behaviors  No change in medical condition or complaints voiced  Fluids maintained at bedside to promote hydration

## 2021-07-20 NOTE — PROGRESS NOTES
07/20/21    Team Meeting   Meeting Type Daily Rounds   Team Members Present   Team Members Present Physician;Nurse;;Occupational Therapist   Physician Team Member Dr Mango Doll MD; JOSE Beasley   Nursing Team Member Manav Snyder RN   Care Management Team Member Cintia Wilde , [facility]   OT Team Member [place], [place]   Patient/Family Present   Patient Present No   Patient's Family Present No   Slept, taking prns for pain in back and trigger finger  Awaiting MA  Intrusive at times, social  Will go to [facility] once MA transferred

## 2021-07-20 NOTE — NURSING NOTE
Patient complained of #8 left hand pain  Diclofenac Sodium (VOLTAREN) 1 % topical gel 2 g and oxyCODONE (ROXICODONE) immediate release tablet 10 mg given at 0245  Will monitor for effect

## 2021-07-20 NOTE — NURSING NOTE
Patient compliant with HS medications  Requested Oxycodone at HS for back pain  Patient requested ativan around 1600 for increased anxiety  Denies any SI/HI  Social with peers  No other concerns or problems reported  Patient currently resting in her room  Q 7 mins checks in place for safety  Will continue to monitor for behaviors and changes

## 2021-07-20 NOTE — NURSING NOTE
Patient compliant during meals and meds  Patient denies anxiety and depression in the morning during breakfast  Stated she slept well the night before  Patient stated at [phone] she was having severe anxiety after not being able to get a hold of best friend who is sick  Got a jefferson score of 25 and gave ativan prn 0 5mg  patient received new order for triple antibiotic ointment for right ear abrasion  Patient stated having lower back and hand pain this morning at an 8/10 gave oxy prn and was effective upon re-evaluate  Q7 min checks in place  Will cont to monitor

## 2021-07-20 NOTE — PLAN OF CARE
Patient is compliant with meds and group  Is very social with peers and staff  Able to talk about concerns even when not prompted    Patient still relying on meds when anxiety is very severe and has a hard time relaxing self when she gets to that point

## 2021-07-20 NOTE — PROGRESS NOTES
Progress Note - Behavioral Health   Darya Bobby 77 y o  female MRN: [id number]  Unit/Bed#: [facility]irit Contre[facility] 201-01 Encounter: [id number]    Assessment/Plan   Principal Problem:    MDD (major depressive disorder), recurrent, severe, with psychosis ([address] )  Active Problems:    Essential hypertension    Chronic hand pain, right    Chronic low back pain    Osteoarthritis of lumbar spine with myelopathy    Vitamin D deficiency    CAD (coronary artery disease)      Behavior over the last 24 hours:  unchanged  Sleep: normal  Appetite: normal  Medication side effects: No  ROS: back pain, otherwise all other systems negative for acute change     Linda Weir was seen today for psychiatric follow-up  She continues to voice frustration with length of stay and the time it is taking for her MA to transfer for [facility] placement  Denies any depressive symptoms and reports she has been sleeping soundly through the night  Nightmares and AVH have resolved over the past few weeks  Linda Weir continues to endorse intermittent anxiety related to unknown discharge date and my girlfriend being terminally ill   PRN medication effective for anxiety  Patient is often visible in the milieu and is social with peers  At times, she remains intrusive, but responds well to redirection  Her appetite remains adequate and she is compliant with psychotropic regimen       Mental Status Evaluation:  Appearance:  age appropriate, casually dressed and overweight   Behavior:  cooperative   Speech:  loud   Mood:  anxious   Affect:  mood-congruent and redirectable   Thought Process:  goal directed   Thought Content:  no overt delusions   Perceptual Disturbances: None   Risk Potential: Suicidal Ideations none  Homicidal Ideations none  Potential for Aggression No   Sensorium:  person, place, time/date and situation   Memory:  recent and remote memory grossly intact   Consciousness:  alert and awake    Attention: attention span and concentration were age appropriate Insight:  limited   Judgment: limited   Gait/Station: normal gait/station and normal balance   Motor Activity: no abnormal movements     Progress Toward Goals:  No change  Will continue with current psychotropic regimen; patient tolerating well  Continuing to await MA and [place]-county transfer  Once complete, patient will be discharged to [facility]  No discharge date at this time  Recommended Treatment: Continue with group therapy, milieu therapy and occupational therapy  Risks, benefits and possible side effects of Medications:   Risks, benefits, and possible side effects of medications explained to patient and patient verbalizes understanding        Medications:   all current active meds have been reviewed, continue current psychiatric medications and current meds:   Current Facility-Administered Medications   Medication Dose Route Frequency    acetaminophen (TYLENOL) tablet 650 mg  650 mg Oral Q4H PRN    acetaminophen (TYLENOL) tablet 650 mg  650 mg Oral Q4H PRN    aluminum-magnesium hydroxide-simethicone (MYLANTA) oral suspension 30 mL  30 mL Oral Q4H PRN    amLODIPine (NORVASC) tablet 5 mg  5 mg Oral Daily    apixaban (ELIQUIS) tablet 5 mg  5 mg Oral BID    [START ON 8/4/2021] cholecalciferol (VITAMIN D3) tablet 1,000 Units  1,000 Units Oral Daily    Diclofenac Sodium (VOLTAREN) 1 % topical gel 2 g  2 g Topical 4x Daily PRN    ergocalciferol (VITAMIN D2) capsule 50,000 Units  50,000 Units Oral Weekly    gabapentin (NEURONTIN) capsule 300 mg  300 mg Oral TID    hydrOXYzine HCL (ATARAX) tablet 25 mg  25 mg Oral Q6H PRN Max 4/day    hydrOXYzine HCL (ATARAX) tablet 50 mg  50 mg Oral Q6H PRN    isosorbide mononitrate (IMDUR) 24 hr tablet 30 mg  30 mg Oral Daily    loperamide (IMODIUM) capsule 2 mg  2 mg Oral TID PRN    LORazepam (ATIVAN) tablet 0 5 mg  0 5 mg Oral Q8H PRN    melatonin tablet 3 mg  3 mg Oral HS    metoprolol tartrate (LOPRESSOR) tablet 25 mg  25 mg Oral Q12H [address]    neomycin-bacitracin-polymyxin b (NEOSPORIN) ointment 1 small application  1 small application Topical BID    OLANZapine (ZyPREXA) IM injection 5 mg  5 mg Intramuscular Q6H PRN Max 4/day    OLANZapine (ZyPREXA) tablet 10 mg  10 mg Oral HS    OLANZapine (ZyPREXA) tablet 2 5 mg  2 5 mg Oral Q6H PRN Max 4/day    OLANZapine (ZyPREXA) tablet 5 mg  5 mg Oral Q6H PRN Max 4/day    OLANZapine (ZyPREXA) tablet 5 mg  5 mg Oral Daily    ondansetron (ZOFRAN-ODT) dispersible tablet 4 mg  4 mg Oral Q6H PRN    oxyCODONE (ROXICODONE) immediate release tablet 10 mg  10 mg Oral Q4H PRN    pantoprazole (PROTONIX) EC tablet 40 mg  40 mg Oral Early Morning    polyethylene glycol (MIRALAX) packet 17 g  17 g Oral Daily PRN    pravastatin (PRAVACHOL) tablet 20 mg  20 mg Oral Daily With Dinner    prazosin (MINIPRESS) capsule 2 mg  2 mg Oral HS    risperiDONE (RisperDAL M-TAB) disintegrating tablet 0 5 mg  0 5 mg Oral Q6H PRN Max 4/day    senna-docusate sodium (SENOKOT S) 8 6-50 mg per tablet 1 tablet  1 tablet Oral Daily PRN    traZODone (DESYREL) tablet 50 mg  50 mg Oral HS PRN    venlafaxine (EFFEXOR-XR) 24 hr capsule 150 mg  150 mg Oral Daily     Labs: I have personally reviewed all pertinent laboratory/tests results    Counseling / Coordination of Care  Total floor / unit time spent today 25 minutes  Greater than 50% of total time was spent with the patient and / or family counseling and / or coordination of care

## 2021-07-21 PROCEDURE — 99232 SBSQ HOSP IP/OBS MODERATE 35: CPT | Performed by: NURSE PRACTITIONER

## 2021-07-21 RX ORDER — AMLODIPINE BESYLATE 5 MG/1
5 TABLET ORAL DAILY
Qty: 30 TABLET | Refills: 0 | Status: SHIPPED | OUTPATIENT
Start: 2021-07-21

## 2021-07-21 RX ORDER — OLANZAPINE 5 MG/1
5 TABLET ORAL DAILY
Qty: 30 TABLET | Refills: 0 | Status: SHIPPED | OUTPATIENT
Start: 2021-07-22 | End: 2021-08-21

## 2021-07-21 RX ORDER — LANOLIN ALCOHOL/MO/W.PET/CERES
3 CREAM (GRAM) TOPICAL
Qty: 30 TABLET | Refills: 0 | Status: SHIPPED | OUTPATIENT
Start: 2021-07-21 | End: 2021-08-20

## 2021-07-21 RX ORDER — SIMVASTATIN 20 MG
20 TABLET ORAL
Qty: 30 TABLET | Refills: 0 | Status: SHIPPED | OUTPATIENT
Start: 2021-07-21

## 2021-07-21 RX ORDER — PRAZOSIN HYDROCHLORIDE 2 MG/1
2 CAPSULE ORAL
Qty: 30 CAPSULE | Refills: 0 | Status: SHIPPED | OUTPATIENT
Start: 2021-07-21 | End: 2021-08-20

## 2021-07-21 RX ORDER — OLANZAPINE 10 MG/1
10 TABLET ORAL
Qty: 30 TABLET | Refills: 0 | Status: SHIPPED | OUTPATIENT
Start: 2021-07-21 | End: 2021-08-20

## 2021-07-21 RX ORDER — ISOSORBIDE MONONITRATE 30 MG/1
30 TABLET, EXTENDED RELEASE ORAL DAILY
Qty: 30 TABLET | Refills: 0 | Status: SHIPPED | OUTPATIENT
Start: 2021-07-22

## 2021-07-21 RX ORDER — GABAPENTIN 300 MG/1
300 CAPSULE ORAL 3 TIMES DAILY
Qty: 90 CAPSULE | Refills: 0 | Status: SHIPPED | OUTPATIENT
Start: 2021-07-21 | End: 2021-08-20

## 2021-07-21 RX ORDER — OXYCODONE HYDROCHLORIDE 10 MG/1
10 TABLET ORAL EVERY 4 HOURS PRN
Qty: 90 TABLET | Refills: 0 | Status: SHIPPED | OUTPATIENT
Start: 2021-07-21 | End: 2021-08-05

## 2021-07-21 RX ORDER — VENLAFAXINE HYDROCHLORIDE 150 MG/1
150 CAPSULE, EXTENDED RELEASE ORAL DAILY
Qty: 30 CAPSULE | Refills: 0 | Status: SHIPPED | OUTPATIENT
Start: 2021-07-21 | End: 2021-08-20

## 2021-07-21 RX ORDER — ERGOCALCIFEROL 1.25 MG/1
50000 CAPSULE ORAL WEEKLY
Qty: 2 CAPSULE | Refills: 0 | Status: SHIPPED | OUTPATIENT
Start: 2021-07-28 | End: 2021-08-05

## 2021-07-21 RX ORDER — PANTOPRAZOLE SODIUM 40 MG/1
40 TABLET, DELAYED RELEASE ORAL
Qty: 30 TABLET | Refills: 0 | Status: SHIPPED | OUTPATIENT
Start: 2021-07-22

## 2021-07-21 RX ORDER — ACETAMINOPHEN 325 MG/1
650 TABLET ORAL EVERY 4 HOURS PRN
Qty: 90 TABLET | Refills: 0 | Status: SHIPPED | OUTPATIENT
Start: 2021-07-21

## 2021-07-21 RX ORDER — MELATONIN
1000 DAILY
Qty: 30 TABLET | Refills: 0 | Status: SHIPPED | OUTPATIENT
Start: 2021-08-04

## 2021-07-21 RX ADMIN — Medication 2 G: at 01:35

## 2021-07-21 RX ADMIN — PRAVASTATIN SODIUM 20 MG: 20 TABLET ORAL at 15:41

## 2021-07-21 RX ADMIN — APIXABAN 5 MG: 5 TABLET, FILM COATED ORAL at 17:05

## 2021-07-21 RX ADMIN — APIXABAN 5 MG: 5 TABLET, FILM COATED ORAL at 08:18

## 2021-07-21 RX ADMIN — GABAPENTIN 300 MG: 300 CAPSULE ORAL at 08:18

## 2021-07-21 RX ADMIN — OXYCODONE HYDROCHLORIDE 10 MG: 10 TABLET ORAL at 14:05

## 2021-07-21 RX ADMIN — LORAZEPAM 0.5 MG: 0.5 TABLET ORAL at 11:29

## 2021-07-21 RX ADMIN — METOPROLOL TARTRATE 25 MG: 25 TABLET, FILM COATED ORAL at 21:16

## 2021-07-21 RX ADMIN — VENLAFAXINE HYDROCHLORIDE 150 MG: 150 CAPSULE, EXTENDED RELEASE ORAL at 08:18

## 2021-07-21 RX ADMIN — OXYCODONE HYDROCHLORIDE 10 MG: 10 TABLET ORAL at 09:33

## 2021-07-21 RX ADMIN — OLANZAPINE 5 MG: 5 TABLET, FILM COATED ORAL at 08:19

## 2021-07-21 RX ADMIN — PHENYTOIN 2 MG: 125 SUSPENSION ORAL at 21:16

## 2021-07-21 RX ADMIN — ISOSORBIDE MONONITRATE 30 MG: 30 TABLET, EXTENDED RELEASE ORAL at 08:18

## 2021-07-21 RX ADMIN — OXYCODONE HYDROCHLORIDE 10 MG: 10 TABLET ORAL at 18:22

## 2021-07-21 RX ADMIN — ALUMINUM HYDROXIDE, MAGNESIUM HYDROXIDE, AND SIMETHICONE 30 ML: 200; 200; 20 SUSPENSION ORAL at 02:59

## 2021-07-21 RX ADMIN — GABAPENTIN 300 MG: 300 CAPSULE ORAL at 21:16

## 2021-07-21 RX ADMIN — PANTOPRAZOLE SODIUM 40 MG: 40 TABLET, DELAYED RELEASE ORAL at 05:36

## 2021-07-21 RX ADMIN — GABAPENTIN 300 MG: 300 CAPSULE ORAL at 15:41

## 2021-07-21 RX ADMIN — ERGOCALCIFEROL 50000 UNITS: 1.25 CAPSULE ORAL at 08:18

## 2021-07-21 RX ADMIN — BACITRACIN ZINC, NEOMYCIN, POLYMYXIN B 1 SMALL APPLICATION: 400; 3.5; 5 OINTMENT TOPICAL at 08:18

## 2021-07-21 RX ADMIN — OLANZAPINE 10 MG: 10 TABLET, FILM COATED ORAL at 21:16

## 2021-07-21 RX ADMIN — MELATONIN 3 MG: at 21:16

## 2021-07-21 RX ADMIN — METOPROLOL TARTRATE 25 MG: 25 TABLET, FILM COATED ORAL at 08:18

## 2021-07-21 RX ADMIN — OXYCODONE HYDROCHLORIDE 10 MG: 10 TABLET ORAL at 05:36

## 2021-07-21 RX ADMIN — Medication 2 G: at 16:39

## 2021-07-21 RX ADMIN — AMLODIPINE BESYLATE 5 MG: 5 TABLET ORAL at 08:19

## 2021-07-21 RX ADMIN — BACITRACIN ZINC, NEOMYCIN, POLYMYXIN B 1 SMALL APPLICATION: 400; 3.5; 5 OINTMENT TOPICAL at 17:05

## 2021-07-21 RX ADMIN — Medication 2 G: at 09:33

## 2021-07-21 NOTE — SOCIAL WORK
GABRIEL met and spoke with patient this morning who stated she would like to be discharged to her daughters home while she waits for her medical assistance to be reinstated  Patient signed ROIs for her daughter, Disha Quezada, as well as her PCP office  GABRIEL then called and spoke with patient's daughter, Disha Quezada, who reported she is open to having her mother stay with her temporarily, however, would need a more definite timeline as to how long she would need until her MA is reinstated  GABRIEL then called and spoke with Ms Shannan Crews at [facility] and she reported the bank statements have not yet been loaded into their system  She asked that the bank statements be sent again to [email or web address]  CM sent for her to review and asked about a timeline  Ms Shannan Crews reported that once the statements are received she should be able to reinstated MA within 24-48 hours  GABRIEL then called and spoke with Scarlett again who stated she feels this is a feasible plan and would be able to  patient tomorrow at 1300  CM will continue to follow patient's progress and assist with discharge planning needs

## 2021-07-21 NOTE — PROGRESS NOTES
07/21/21    Team Meeting   Meeting Type Daily Rounds   Team Members Present   Team Members Present Physician;Nurse;;Occupational Therapist   Physician Team Member Dr Winston Raymond MD; JOSE Pack   Nursing Team Member Justin Koehler RN   Care Management Team Member [place] R[place], [facility]   OT Team Member [place], [place]   Patient/Family Present   Patient Present No   Patient's Family Present No   PT requesting to go to [facility] until MA is completed and then go to [facility],  to follow up  PT continues with somatic symptoms, prns, and antibiotic to ear

## 2021-07-21 NOTE — NURSING NOTE
PRN Voltaren Gel administered to the right hand was effective for patient  She is awake at this time and requests PRN for indigestion  Administered PRN Mylanta as per order  Will monitor for medication effectiveness

## 2021-07-21 NOTE — PROGRESS NOTES
Pt up ad krish & gait is steady  Pt denies any depression or anxiety  Pt denies any hallucinations, suicidal or homicidal ideations  Pt medicated for chronic back pain as ordered by MD with Oxy-IR po with relief obtained  Knee high OREN stockings on  Q 7 min checks maintained to monitor pt's behavior & safety  Pt is pleasant & socializes with other patients  Pt is cooperative & compliant with medications

## 2021-07-21 NOTE — SOCIAL WORK
CM met and spoke with patient and informed her she is to be picked up by her daughter, Scarlett, at 1 PM tomorrow  CM discussed with patient about psychiatric follow up  Patient reported that she would prefer to wait until she is settled in a [facility] and find a provider near her, however, she was agreeable to signing a MAITE to be set up with services through [facility] noting she has followed with them for about 10 years  CM then called and left  for the intake line requesting call back to arrange follow up appointment  CM will continue to follow patient's progress and assist with discharge planning needs

## 2021-07-21 NOTE — NURSING NOTE
Patient experienced broken sleep throughout the overnight period  She remains quietly in her room at this time  No obvious signs of distress or discomfort noted  No further c/o of stomach upset or indigestion voiced  Will CTM via q7 minute safety checks

## 2021-07-21 NOTE — PROGRESS NOTES
Progress Note - Behavioral Health   Darya Bobby 77 y o  female MRN: [id number]  Unit/Bed#: [facility]irit Contre[facility] 201-01 Encounter: [id number]    Assessment/Plan   Principal Problem:    MDD (major depressive disorder), recurrent, severe, with psychosis ([facility] [address] )  Active Problems:    Essential hypertension    Chronic hand pain, right    Chronic low back pain    Osteoarthritis of lumbar spine with myelopathy    Vitamin D deficiency    CAD (coronary artery disease)      Behavior over the last 24 hours:  unchanged  Sleep: normal  Appetite: normal  Medication side effects: No  ROS: Back pain, otherwise all other systems negative for acute change     Linda Weir was seen today for psychiatric follow-up  Initially, patient was irritable regarding her length of stay and the time it is taking for [facility] placement  She requested she be discharged to her daughter's home, in which her daughter was in agreement to take patient in until inter county transfer is complete for [facility] placement  Linda Weir denies any depression reports minimal anxiety  She also is sleeping well throughout the night and her appetite remains adequate  She has been compliant with psychotropic regimen denies any side effects  She is often visible in the milieu and social with peers        Mental Status Evaluation:  Appearance:  age appropriate and casually dressed   Behavior:  Cooperative   Speech:  normal pitch and normal volume   Mood:  irritable   Affect:  mood-congruent and redirectable   Thought Process:  goal directed   Thought Content:  No overt delusions   Perceptual Disturbances: None   Risk Potential: Suicidal Ideations none  Homicidal Ideations none  Potential for Aggression No   Sensorium:  person, place, time/date and situation   Memory:  recent and remote memory grossly intact   Consciousness:  alert and awake    Attention: attention span and concentration were age appropriate   Insight:  limited   Judgment: limited   Gait/Station: normal gait/station and normal balance   Motor Activity: no abnormal movements     Progress Toward Goals:  No change  Will continue with current psychotropic regimen  Patient reports significant improvement with her anxiety depression and no longer endorses AVH or nightmares  Sleep has significantly improved  Patient will be discharged to daughter's home tomorrow and await inter county transfer for [facility] placement  She will follow up with [facility] until placed at [facility]  Recommended Treatment: Continue with group therapy, milieu therapy and occupational therapy  Risks, benefits and possible side effects of Medications:   Risks, benefits, and possible side effects of medications explained to patient and patient verbalizes understanding        Medications:   all current active meds have been reviewed, continue current psychiatric medications and current meds:   Current Facility-Administered Medications   Medication Dose Route Frequency    acetaminophen (TYLENOL) tablet 650 mg  650 mg Oral Q4H PRN    acetaminophen (TYLENOL) tablet 650 mg  650 mg Oral Q4H PRN    aluminum-magnesium hydroxide-simethicone (MYLANTA) oral suspension 30 mL  30 mL Oral Q4H PRN    amLODIPine (NORVASC) tablet 5 mg  5 mg Oral Daily    apixaban (ELIQUIS) tablet 5 mg  5 mg Oral BID    [START ON 8/4/2021] cholecalciferol (VITAMIN D3) tablet 1,000 Units  1,000 Units Oral Daily    Diclofenac Sodium (VOLTAREN) 1 % topical gel 2 g  2 g Topical 4x Daily PRN    ergocalciferol (VITAMIN D2) capsule 50,000 Units  50,000 Units Oral Weekly    gabapentin (NEURONTIN) capsule 300 mg  300 mg Oral TID    hydrOXYzine HCL (ATARAX) tablet 25 mg  25 mg Oral Q6H PRN Max 4/day    hydrOXYzine HCL (ATARAX) tablet 50 mg  50 mg Oral Q6H PRN    isosorbide mononitrate (IMDUR) 24 hr tablet 30 mg  30 mg Oral Daily    loperamide (IMODIUM) capsule 2 mg  2 mg Oral TID PRN    LORazepam (ATIVAN) tablet 0 5 mg  0 5 mg Oral Q8H PRN    melatonin tablet 3 mg  3 mg Oral HS    metoprolol tartrate (LOPRESSOR) tablet 25 mg  25 mg Oral Q12H [address]    neomycin-bacitracin-polymyxin b (NEOSPORIN) ointment 1 small application  1 small application Topical BID    OLANZapine (ZyPREXA) IM injection 5 mg  5 mg Intramuscular Q6H PRN Max 4/day    OLANZapine (ZyPREXA) tablet 10 mg  10 mg Oral HS    OLANZapine (ZyPREXA) tablet 2 5 mg  2 5 mg Oral Q6H PRN Max 4/day    OLANZapine (ZyPREXA) tablet 5 mg  5 mg Oral Q6H PRN Max 4/day    OLANZapine (ZyPREXA) tablet 5 mg  5 mg Oral Daily    ondansetron (ZOFRAN-ODT) dispersible tablet 4 mg  4 mg Oral Q6H PRN    oxyCODONE (ROXICODONE) immediate release tablet 10 mg  10 mg Oral Q4H PRN    pantoprazole (PROTONIX) EC tablet 40 mg  40 mg Oral Early Morning    polyethylene glycol (MIRALAX) packet 17 g  17 g Oral Daily PRN    pravastatin (PRAVACHOL) tablet 20 mg  20 mg Oral Daily With Dinner    prazosin (MINIPRESS) capsule 2 mg  2 mg Oral HS    risperiDONE (RisperDAL M-TAB) disintegrating tablet 0 5 mg  0 5 mg Oral Q6H PRN Max 4/day    senna-docusate sodium (SENOKOT S) 8 6-50 mg per tablet 1 tablet  1 tablet Oral Daily PRN    traZODone (DESYREL) tablet 50 mg  50 mg Oral HS PRN    venlafaxine (EFFEXOR-XR) 24 hr capsule 150 mg  150 mg Oral Daily     Labs: I have personally reviewed all pertinent laboratory/tests results  Counseling / Coordination of Care  Total floor / unit time spent today 25 minutes  Greater than 50% of total time was spent with the patient and / or family counseling and / or coordination of care

## 2021-07-21 NOTE — NURSING NOTE
Patient is awake at this time  She requested and received PRN Voltaren Gel for c/o right hand pain d/t "trigger finger"  Will monitor for medication effectiveness

## 2021-07-21 NOTE — NURSING NOTE
Patient was observed to be in the community this evening  Attended group and snack time without issue  She is pleasant and cooperative during staff interactions  She informs her anxiety is "good", denies depression, denies SI, HI and hallucinations  She c/o 8/10 lower back pain for which she requested and received PRN Oxycodone at 2025  On reassessment, patient informs "I feel good " and informs her pain was reduced to 5/10  Patient was medication compliant at   Will CTM  Q7 minute safety checks in progress

## 2021-07-21 NOTE — NURSING NOTE
Pt medicated for c/o increased anxiety 9/10 @ 1129 with Ativan 0 5 mg po as ordered by MD with moderate relief obtained  Jack Champion - 16 @ that time  Q 7 min checks maintained to monitor pt's behavior & safety

## 2021-07-21 NOTE — PROGRESS NOTES
Progress Note - Jay Herron 77 y o  female MRN: [id number]    Unit/Bed#David Mcqueen 201-01 Encounter: [id number]        Subjective:   Patient seen and examined at bedside after reviewing the chart and discussing the case with the caring staff  Patient examined at bedside  Patient has no acute complaints  Patient is going to a personal care home in [facility]  Patient will be requiring all her medications  I reviewed and reconciled patient's problem list and medications  Physical Exam   Vitals: Blood pressure 116/76, pulse 67, temperature (!) 97 1 °F (36 2 °C), temperature source Temporal, resp  rate 18, height 5' 3" (1 6 m), weight 104 kg (228 lb 6 3 oz), SpO2 95 %  ,Body mass index is 40 46 kg/m²  Constitutional: He appears well-developed  HEENT: PERR, EOMI, MMM  Cardiovascular: Normal rate and regular rhythm  Pulmonary/Chest: Effort normal and breath sounds normal    Abdomen: Soft, + BS, NT    Assessment/Plan:  Jay Herron is a(n) 77y o  year old female with MDD with psychotic symptoms    MEDICAL CLEARANCE  Patient is medically cleared for discharge  All scripts will be sent out for the patient once discharge is finalized to the relevant pharmacy  1  Cardiac with history of hypertension, dyslipidemia, CHF, atrial fibrillation, coronary artery disease status post pacemaker placement  Patient will be continued on metoprolol 25 mg b i d , amlodipine 5 mg daily, Imdur 30 mg daily, Pravachol 20 mg daily and Eliquis 5 mg 2 times daily  2  Neuropathy  Patient is on gabapentin 300 mg 3 times daily  3  GERD  Patient is on Protonix 40 mg daily  4  DJD/osteoarthritis  Patient is on oxycodone 10 mg severe pain p r n  Abril Galley Patient may use heating pad  5  Gait abnormality  PT OT consulted  6  Vitamin-D deficiency  Patient is on vitamin-D bolus doses for 8 weeks followed by vitamin D3 1000 units daily  7  Vitamin B12 deficiency  Patient is on monthly B12 injections    8  Left hand trigger finger index and middle  Patient may apply Voltaren gel over the affected fingers  Spoke with PT who states they do not have compression wrist braces in inpatient setting as patient would need to be fitted for it  9  Abrasion of right ear  Patient may apply Neosporin 2 times daily  The patient was discussed with Dr Charis Pringle and he is in agreement with the above note

## 2021-07-21 NOTE — NURSING NOTE
Patient is awake at this time  She c/o of one episode of vomiting yellow bile  Encouraged patient to inform staff if she should vomit again so staff can check  She also continues to complain of lower back pain and requests PRN Oxycodone for rating of 8/10  Administered as per order and per patient request  Will CTM via q7 minute safety checks

## 2021-07-21 NOTE — DISCHARGE INSTR - APPOINTMENTS
A follow up appointment has been made on your behalf for medication management with [facility], [address], [address], [facility], [address]  Phone: [phone]  Your appointment is scheduled for Monday, July 26, 2021 at 1 PM with Wilbur Gamboa PA-C  Please plan to arrive 15 minutes prior to your scheduled appointment, bring your insurance card(s) and photo ID  Your discharge will be faxed to this provider for continuity of care  A referral has been made on your behalf for psychiatric medication management with [address] [place] [address],[address], [facility], [address]  Phone: [phone]  Your TELEPHONE appointment is scheduled for Tuesday, July 27, 2021 at 12 PM with selene Dupree  Your discharge will be faxed to this provider for continuity of care  Please be sure to follow up with Ms Linda Avila with the [facility] ([place] regarding your medical assistance (MA) being re-instated as well as an inter-county transfer so that your benefits are able to go through the [facility] once admitted to the personal care home  She can be reached at [phone]  Please be sure to communicate with Gary with [facility] and inform him once the MA has been reinstated  He will be able to coordinate your admission to the home  He can be reached at [phone]  Kitty Del Rosario RN, our [employer], will be calling you after your discharge, on the phone number that you provided  She will be available as an additional support, if needed  If you wish to speak with her, you may contact [employer] Mason at [phone]

## 2021-07-22 VITALS
RESPIRATION RATE: 20 BRPM | OXYGEN SATURATION: 99 % | TEMPERATURE: 96.7 F | SYSTOLIC BLOOD PRESSURE: 166 MMHG | HEIGHT: 63 IN | DIASTOLIC BLOOD PRESSURE: 78 MMHG | HEART RATE: 72 BPM | WEIGHT: 228.84 LBS | BODY MASS INDEX: 40.55 KG/M2

## 2021-07-22 PROBLEM — F33.3 MDD (MAJOR DEPRESSIVE DISORDER), RECURRENT, SEVERE, WITH PSYCHOSIS (HCC): Status: RESOLVED | Noted: 2021-06-16 | Resolved: 2021-07-22

## 2021-07-22 PROCEDURE — 99238 HOSP IP/OBS DSCHRG MGMT 30/<: CPT | Performed by: NURSE PRACTITIONER

## 2021-07-22 RX ORDER — HYDROXYZINE HYDROCHLORIDE 25 MG/1
25 TABLET, FILM COATED ORAL DAILY PRN
Qty: 30 TABLET | Refills: 0 | Status: SHIPPED | OUTPATIENT
Start: 2021-07-22 | End: 2021-08-21

## 2021-07-22 RX ADMIN — GABAPENTIN 300 MG: 300 CAPSULE ORAL at 08:31

## 2021-07-22 RX ADMIN — VENLAFAXINE HYDROCHLORIDE 150 MG: 150 CAPSULE, EXTENDED RELEASE ORAL at 08:31

## 2021-07-22 RX ADMIN — ALUMINUM HYDROXIDE, MAGNESIUM HYDROXIDE, AND SIMETHICONE 30 ML: 200; 200; 20 SUSPENSION ORAL at 05:57

## 2021-07-22 RX ADMIN — PANTOPRAZOLE SODIUM 40 MG: 40 TABLET, DELAYED RELEASE ORAL at 05:52

## 2021-07-22 RX ADMIN — APIXABAN 5 MG: 5 TABLET, FILM COATED ORAL at 08:32

## 2021-07-22 RX ADMIN — OXYCODONE HYDROCHLORIDE 10 MG: 10 TABLET ORAL at 11:50

## 2021-07-22 RX ADMIN — OXYCODONE HYDROCHLORIDE 10 MG: 10 TABLET ORAL at 07:35

## 2021-07-22 RX ADMIN — AMLODIPINE BESYLATE 5 MG: 5 TABLET ORAL at 08:32

## 2021-07-22 RX ADMIN — OLANZAPINE 5 MG: 5 TABLET, FILM COATED ORAL at 08:32

## 2021-07-22 RX ADMIN — BACITRACIN ZINC, NEOMYCIN, POLYMYXIN B 1 SMALL APPLICATION: 400; 3.5; 5 OINTMENT TOPICAL at 08:30

## 2021-07-22 RX ADMIN — ISOSORBIDE MONONITRATE 30 MG: 30 TABLET, EXTENDED RELEASE ORAL at 08:32

## 2021-07-22 RX ADMIN — Medication 2 G: at 10:53

## 2021-07-22 RX ADMIN — METOPROLOL TARTRATE 25 MG: 25 TABLET, FILM COATED ORAL at 08:31

## 2021-07-22 RX ADMIN — OXYCODONE HYDROCHLORIDE 10 MG: 10 TABLET ORAL at 00:14

## 2021-07-22 NOTE — NURSING NOTE
Patient was observed to be in the community this evening  Attended group and snack time; social with peers  Speech continues to be loud  Intrusive at times with other patients  She endorses mild anxiety, denies depression, denies SI, HI and hallucinations  She was medication compliant at   Informs she is looking forward to discharge to her daughter's home tomorrow  Will CTM  Q7 minute safety checks in progress

## 2021-07-22 NOTE — SOCIAL WORK
CM received phone call from Francine Celaya with [address] stating she can arrange an appointment for patient but first would need patient's demographic sheet emailed to her at [email or web address]  Rodriguez Gregg will email the appointment date/time once arranged  CM will continue to follow patient's progress and assist with discharge planning needs

## 2021-07-22 NOTE — PROGRESS NOTES
Progress Note - Yamileth Muñoz 77 y o  female MRN: [id number]    Unit/Bed#Jan Mckinley 201-01 Encounter: [id number]        Subjective:   Patient seen and examined at bedside after reviewing the chart and discussing the case with the caring staff  Patient examined at bedside  Patient complains of urinary incontinence for the past few months  She is being discharged today and states she will follow-up with her PCP regarding this  Patient denies any dysuria, hematuria, malodor, urinary frequency  Patient is going to a personal care home in [address] [place] [address]  Patient is requesting all her prescriptions  I reviewed and reconciled patient's problem list and medications  Physical Exam   Vitals: Blood pressure 166/78, pulse 72, temperature (!) 96 7 °F (35 9 °C), temperature source Temporal, resp  rate 20, height 5' 3" (1 6 m), weight 104 kg (228 lb 13 4 oz), SpO2 99 %  ,Body mass index is 40 54 kg/m²  Constitutional: He appears well-developed  HEENT: PERR, EOMI, MMM  Cardiovascular: Normal rate and regular rhythm  Pulmonary/Chest: Effort normal and breath sounds normal    Abdomen: Soft, + BS, NT    Assessment/Plan:  Yamileth Muñoz is a(n) 77y o  year old female with MDD with psychotic symptoms    MEDICAL CLEARANCE  Patient is medically cleared for discharge  All scripts will were sent out for the patient        1  Cardiac with history of hypertension, dyslipidemia, CHF, atrial fibrillation, coronary artery disease status post pacemaker placement  Patient will be continued on metoprolol 25 mg b i d , amlodipine 5 mg daily, Imdur 30 mg daily, Pravachol 20 mg daily and Eliquis 5 mg 2 times daily  2  Neuropathy  Patient is on gabapentin 300 mg 3 times daily  3  GERD  Patient is on Protonix 40 mg daily  4  DJD/osteoarthritis  Patient is on oxycodone 10 mg severe pain p r n  Ferol Ben Patient may use heating pad  5  Gait abnormality  PT OT consulted  6  Vitamin-D deficiency    Patient is on vitamin-D bolus doses for 8 weeks followed by vitamin D3 1000 units daily  7  Vitamin B12 deficiency  Patient is on monthly B12 injections  8  Left hand trigger finger index and middle  Patient may apply Voltaren gel over the affected fingers  Spoke with PT who states they do not have compression wrist braces in inpatient setting as patient would need to be fitted for it  9  Abrasion of right ear  Patient may apply Neosporin 2 times daily  The patient was discussed with Dr Megan Man and he is in agreement with the above note

## 2021-07-22 NOTE — PROGRESS NOTES
This patient discharged with the following items 1 bag of poise pads,2 small bags,shampoo,bodywash,flip flops,1 blue sweatshirt,1 cat blanket,1 pink mask,1 pr of purple pants,1 pr of sandals,6 assorted socks,1 blk bra,1 lt green shirt,1 pr of grey shorts,1 pr of blue jeans,1 gerey sweats,1 grey tshirt, 1 pr of green pants,1 pr of kimberlee pants,1 white pr of pants,3 blk tshirts,1 green backpack,4 pr of underwear,1 hair clip grey green ,20 00 in cash

## 2021-07-22 NOTE — NURSING NOTE
Patient is awake at this time  She informs of lower back pain with a rating of 8/10  She requested and received PRN Oxycodone for severe pain  Will monitor for medication effectiveness

## 2021-07-22 NOTE — BH TRANSITION RECORD
Contact Information: If you have any questions, concerns, pended studies, tests and/or procedures, or emergencies regarding your inpatient behavioral health visit  Please contact [facility] older adult behavioral health unit [phone] and ask to speak to a , nurse or physician  A contact is available 24 hours/ 7 days a week at this number  Summary of Procedures Performed During your Stay:  Below is a list of major procedures performed during your hospital stay and a summary of results:  - Major Imaging Studies: XR of foot  Pending Studies (From admission, onward)    None        If studies are pending at discharge, follow up with your PCP and/or referring provider

## 2021-07-22 NOTE — NURSING NOTE
Pt discharged home via main entrance of hospital via ambulation accompanied by BHT & belongings sent with pt  Pt discharged to daughter's home via car accompanied by daughter

## 2021-07-22 NOTE — NURSING NOTE
Patient requested and received PRN Mylanta for c/o heartburn and indigestion  Will monitor for medication effectiveness

## 2021-07-22 NOTE — DISCHARGE SUMMARY
Discharge Summary - Geno Leon 77 y o  female MRN: [id number]  Unit/Bed#: Desmond To 201-01 Encounter: [id number]     Admission Date: 6/15/2021         Discharge Date: 21 @ 1300    Attending Psychiatrist: Pedro Hu MD    Reason for Admission/HPI: Depression [F32 9]  Psychosis [facility]) [F29]    According to H&P by Dr Elgin Calderón 21:    History of Present Illness     Cheyanne Vega is a 77 y o  female with a history of depression versus bipolar disorder who was admitted to the inpatient older adult psychiatric unit on a voluntary 201 commitment basis due to severe worsening of depression, anxiety, unstable mood, psychotic symptoms, auditory hallucinations, visual hallucinations, delusional thoughts, confusion and inability to care for self  On evaluation in the inpatient psychiatric unit Eber Curiel presents initially tearful, anxious and reports increased restlessness, difficulty maintaining sleep d/t nightmares of "demons," and visual hallucinations of living and  acquaintances and auditory hallucinations of hearing her daughter's voice  Reports visual hallucinations of demons started approximately one month ago after "Ceci's death  Patient also eports increasing depressive symptoms due to death of sister, friend and now terminal illness of best friend for 40 years  "I don't know how I will go on if she dies, and I can't stop her from dying " She denies current SI/HI, plan, intent or passive death wish and contracts for safety on the unit  She is limited historian and difficult to determine if she has been compliant with prescribed medications  Currently being treated for an UTI with Keflex  She is oriented to self, situation but not in date  She is  unable to name location and timelines are confused and incongruent and needs frequent redirection and refocusing is answering questions  She agrees to be compliant with medication and treatment plan  Hospital Course:  The patient was admitted to the inpatient psychiatric unit and started on every 7 minutes precautions  During the hospitalization the patient was attending individual therapy, group therapy, milieu therapy and occupational therapy  Psychiatric medications were titrated over the hospital stay  To address depression, mood instability, psychotic symptoms, delusional thoughts, auditory hallucinations, visual hallucinations, anxiety symptoms, confusion and inability to care for self the patient was started on antidepressant Effexor, mood stabilizer Neurontin, antipsychotic medication Zyprexa, hypnotic medication Melatonin and medication to help with nightmares and flashbacks Prazosin  Medication doses were titrated during the hospital course  Prior to beginning of treatment medications risks and benefits and possible side effects including risk of parkinsonian symptoms, Tardive Dyskinesia and metabolic syndrome related to treatment with antipsychotic medications, risk of cardiovascular events in elderly related to treatment with antipsychotic medications, risk of suicidality and serotonin syndrome related to treatment with antidepressants and risk of impaired next-day mental alertness, complex sleep-related behavior and dependence related to treatment with hypnotic medications were reviewed with the patient  The patient verbalized understanding and agreement for treatment  Patient's symptoms improved gradually over the hospital course  At the end of treatment the patient was doing well  Mood was stable at the time of discharge  The patient denied suicidal ideation, intent or plan at the time of discharge and denied homicidal ideation, intent or plan at the time of discharge  There was no overt psychosis at the time of discharge  Sleep and appetite were improved  The patient was tolerating medications and was not reporting any significant side effects at the time of discharge      We felt that at the end of the hospital stay Elmo Lynn was at baseline and was ready for discharge  Brenda's daughterDonald, felt comfortable with Brenda's discharge  Elmo Lynn also felt stable and ready for discharge at the end of the hospital stay  Patient will stay with daughter, Donald Solano, temporarily while she waits for medical assistance to be reinstated; once complete, patient will transition to [facility] in [place]  The outpatient follow up with [address] [place] [address] for intake (7/27/21) and PCP (7/26/21) was arranged by the unit  upon discharge      Mental Status at time of Discharge:     Appearance:  age appropriate and casually dressed   Behavior:  cooperative, calm   Speech:  normal pitch and normal volume   Mood:  euthymic   Affect:  mood-congruent   Thought Process:  goal directed and logical   Thought Content:  no overt delusions   Perceptual Disturbances: None   Risk Potential: Suicidal Ideations none, Homicidal Ideations none and Potential for Aggression No   Sensorium:  person, place, time/date and situation   Cognition:  recent and remote memory grossly intact   Consciousness:  alert and awake    Attention: attention span and concentration were age appropriate   Insight:  limited   Judgment: limited   Gait/Station: normal gait/station and normal balance   Motor Activity: no abnormal movements     Admission Diagnosis:Depression [F32 9]  Psychosis ([facility] ) [F29]    Discharge Diagnosis:   Principal Problem (Resolved):    MDD (major depressive disorder), recurrent, severe, with psychosis ([facility] )  Active Problems:    Essential hypertension    Chronic hand pain, right    Chronic low back pain    Osteoarthritis of lumbar spine with myelopathy    Vitamin D deficiency    CAD (coronary artery disease)  Resolved Problems:    UTI (urinary tract infection)        Lab results:  Admission on 06/15/2021   Component Date Value    Vitamin B-12 06/16/2021 116     Folate 06/16/2021 13 0     Vit D, 25-Hydroxy 06/16/2021 19 1*    WBC 07/06/2021 5 70     RBC 07/06/2021 3 80*    Hemoglobin 07/06/2021 11 8*    Hematocrit 07/06/2021 35 1*    MCV 07/06/2021 92     MCH 07/06/2021 31 0     MCHC 07/06/2021 33 5     RDW 07/06/2021 14 5     MPV 07/06/2021 8 4*    Platelets 93/63/6253 234     Neutrophils Relative 07/06/2021 49     Lymphocytes Relative 07/06/2021 34     Monocytes Relative 07/06/2021 11     Eosinophils Relative 07/06/2021 5     Basophils Relative 07/06/2021 1     Neutrophils Absolute 07/06/2021 2 80     Lymphocytes Absolute 07/06/2021 1 90     Monocytes Absolute 07/06/2021 0 60     Eosinophils Absolute 07/06/2021 0 30     Basophils Absolute 07/06/2021 0 00     Sodium 07/06/2021 139     Potassium 07/06/2021 4 2     Chloride 07/06/2021 106     CO2 07/06/2021 28     ANION GAP 07/06/2021 5     BUN 07/06/2021 17     Creatinine 07/06/2021 0 81     Glucose 07/06/2021 84     Glucose, Fasting 07/06/2021 84     Calcium 07/06/2021 8 7     AST 07/06/2021 17     ALT 07/06/2021 19     Alkaline Phosphatase 07/06/2021 138     Total Protein 07/06/2021 6 0*    Albumin 07/06/2021 3 5     Total Bilirubin 07/06/2021 0 40     eGFR 07/06/2021 75        Discharge Medications:  Current Discharge Medication List      START taking these medications    Details   apixaban (ELIQUIS) 5 mg Take 1 tablet (5 mg total) by mouth 2 (two) times a day  Qty: 60 tablet, Refills: 0    Associated Diagnoses: CAD (coronary artery disease)      cholecalciferol (VITAMIN D3) 1,000 units tablet Take 1 tablet (1,000 Units total) by mouth daily  Qty: 30 tablet, Refills: 0    Associated Diagnoses: Vitamin D deficiency      Diclofenac Sodium (VOLTAREN) 1 % Apply 2 g topically 4 (four) times a day as needed (Shoulder, back and hand pain)  Qty: 350 g, Refills: 0    Associated Diagnoses: DJD (degenerative joint disease)      ergocalciferol (VITAMIN D2) 50,000 units Take 1 capsule (50,000 Units total) by mouth once a week for 2 doses  Qty: 2 capsule, Refills: 0 Associated Diagnoses: Vitamin D deficiency      hydrOXYzine HCL (ATARAX) 25 mg tablet Take 1 tablet (25 mg total) by mouth daily as needed for anxiety  Qty: 30 tablet, Refills: 0    Associated Diagnoses: MDD (major depressive disorder), recurrent, severe, with psychosis ([facility])      melatonin 3 mg Take 1 tablet (3 mg total) by mouth daily at bedtime  Qty: 30 tablet, Refills: 0    Associated Diagnoses: MDD (major depressive disorder), recurrent, severe, with psychosis ([facility])      oxyCODONE (ROXICODONE) 10 MG TABS Take 1 tablet (10 mg total) by mouth every 4 (four) hours as needed for severe pain for up to 15 daysMax Daily Amount: 60 mg  Qty: 90 tablet, Refills: 0    Associated Diagnoses: DJD (degenerative joint disease); Chronic pain disorder      prazosin (MINIPRESS) 2 mg capsule Take 1 capsule (2 mg total) by mouth daily at bedtime  Qty: 30 capsule, Refills: 0    Associated Diagnoses: MDD (major depressive disorder), recurrent, severe, with psychosis ([facility] )            Current Discharge Medication List      STOP taking these medications       buPROPion (WELLBUTRIN XL) 300 mg 24 hr tablet Comments:   Reason for Stopping:         clonazePAM (KlonoPIN) 2 mg tablet Comments:   Reason for Stopping:         meloxicam (MOBIC) 7 5 mg tablet Comments:   Reason for Stopping:         aspirin 81 MG tablet Comments:   Reason for Stopping:         cyclobenzaprine (FLEXERIL) 10 mg tablet Comments:   Reason for Stopping:         Lidocaine HCl (DR Kennedy Schrader [facility] MOUTH [address]) Comments:   Reason for Stopping:              Current Discharge Medication List      CONTINUE these medications which have CHANGED    Details   acetaminophen (TYLENOL) 325 mg tablet Take 2 tablets (650 mg total) by mouth every 4 (four) hours as needed for mild pain or moderate pain  Qty: 90 tablet, Refills: 0    Associated Diagnoses: DJD (degenerative joint disease);  Chronic pain disorder      amLODIPine (NORVASC) 5 mg tablet Take 1 tablet (5 mg total) by mouth daily  Qty: 30 tablet, Refills: 0    Associated Diagnoses: Essential hypertension      gabapentin (NEURONTIN) 300 mg capsule Take 1 capsule (300 mg total) by mouth 3 (three) times a day  Qty: 90 capsule, Refills: 0    Associated Diagnoses: MDD (major depressive disorder), recurrent, severe, with psychosis (HCC)      isosorbide mononitrate (IMDUR) 30 mg 24 hr tablet Take 1 tablet (30 mg total) by mouth daily  Qty: 30 tablet, Refills: 0    Associated Diagnoses: CAD (coronary artery disease)      metoprolol tartrate (LOPRESSOR) 25 mg tablet Take 1 tablet (25 mg total) by mouth every 12 (twelve) hours  Qty: 60 tablet, Refills: 0    Associated Diagnoses: Essential hypertension      !! OLANZapine (ZyPREXA) 10 mg tablet Take 1 tablet (10 mg total) by mouth daily at bedtime Take 1 tablet at 9PM (before bed)  Qty: 30 tablet, Refills: 0    Associated Diagnoses: MDD (major depressive disorder), recurrent, severe, with psychosis ([facility] )      ! ! OLANZapine (ZyPREXA) 5 mg tablet Take 1 tablet (5 mg total) by mouth daily Take 1 tablet at 9AM  Qty: 30 tablet, Refills: 0    Associated Diagnoses: MDD (major depressive disorder), recurrent, severe, with psychosis (HCC)      pantoprazole (PROTONIX) 40 mg tablet Take 1 tablet (40 mg total) by mouth daily in the early morning  Qty: 30 tablet, Refills: 0    Associated Diagnoses: GERD (gastroesophageal reflux disease)      simvastatin (ZOCOR) 20 mg tablet Take 1 tablet (20 mg total) by mouth daily at bedtime  Qty: 30 tablet, Refills: 0    Associated Diagnoses: Dyslipidemia      venlafaxine (EFFEXOR-XR) 150 mg 24 hr capsule Take 1 capsule (150 mg total) by mouth daily  Qty: 30 capsule, Refills: 0    Associated Diagnoses: MDD (major depressive disorder), recurrent, severe, with psychosis ([facility] )       ! ! - Potential duplicate medications found  Please discuss with provider           Current Discharge Medication List           Discharge instructions/Information to patient and family:   See after visit summary for information provided to patient and family  Provisions for Follow-Up Care:  See after visit summary for information related to follow-up care and any pertinent home health orders  Discharge Statement:    I spent 15 minutes discharging the patient  This time was spent on the day of discharge  I had direct contact with the patient on the day of discharge       JOSE Telles 07/22/21

## 2021-07-22 NOTE — NURSING NOTE
Patient was able to sleep through the remainder of the overnight hours after receiving PRN Oxycodone for severe, chronic lower back pain  She woke at approximately 0530 and was observed to be ambulating the unit hallway  No acute behaviors noted  Will CTM via q7 minute safety checks

## 2021-07-22 NOTE — PROGRESS NOTES
07/22/21    Team Meeting   Meeting Type Daily Rounds   Team Members Present   Team Members Present Physician;Nurse;;Occupational Therapist   Physician Team Member Dr Maureen Novak MD; JOSE Leung   Nursing Team Member Lalito Leon, [facility]   Care Management Team Member Kiana Keys MS, [facility]   OT Team Member [place], [place]   Patient/Family Present   Patient Present No   Patient's Family Present No   D/C at 1pm to [facility], will follow up with outpatient psych  Once MA is transitioned she will transition to [facility] from [facility]

## 2021-07-22 NOTE — NURSING NOTE
Pt denies any depression or anxiety  Pt up ad krish & gait is steady  Pt denies any hallucinations, suicidal or homicidal ideations  Q 7 min checks maintained to monitor pt's behavior & safety  Pt medicated for c/o chronic lower back pain Q 4 hrs prn with Oxy-IR po as ordered by MD with relief obtained  Pt is pleasant & cooperative  Pt is compliant with medications  Pt instructed on discharge instructions & medications;  & verbalized understanding of instructions  Pt awaiting discharge home today with her daughter

## 2021-07-22 NOTE — SOCIAL WORK
CM received phone call from [facility] stating they are unable to schedule patient as they currently have a 6-month wait list to be seen by a psychiatrist/therapist  CM was instructed to try [address] [place] [address],[address] ([phone])  CM called and left  for the intake department requesting call back  CM will continue to follow patient's progress and assist with discharge planning needs

## 2021-07-27 ENCOUNTER — TELEPHONE (OUTPATIENT)
Dept: CASE MANAGEMENT | Facility: HOSPITAL | Age: 66
End: 2021-07-27

## 2021-07-27 NOTE — TELEPHONE ENCOUNTER
SW received voicemail from patient requesting SW contact Lucas Wright from [facility] to advise that her MA has been transferred to [place] as requested; pt states on voicemail that she has confirmed with MA that the insurance has been transferred    CARTER sent email to [facility] advising pt confirms MA transferred but as patient is no longer on unit, SW is unable to confirm that with MA; CARTER recommends [facility] treat patient admission as a community admission vs hospital admission due to patient requesting to discharge to daughter's home while waiting for MA process to complete     SW placed phone call to pt to advise of same; pt expressed concern re: possible loss of [facility] bed - CARTER advised pt to contact [facility] and discuss a possible community admission and what else will be required for that - pt expressed understanding;  No additional concerns/questions; call mutually ended

## 2022-04-26 NOTE — PLAN OF CARE
Patient presents for MD follow up. Wound appears to be unchanged. Treatment plan to continue as previously discussed. Return in 2 weeks for next follow up.      Problem: Non-Pressure Injury Wound  Goal: # No deterioration in wound  Outcome: Outcome Not Met, Continue to Monitor  Goal: # Verbalizes understanding of wound and wound care  Description: If abnormality is a skin tear, avoid using tape on skin including transparent dressings. Document education using the patient education activity.  Outcome: Outcome Not Met, Continue to Monitor  Goal: Participates in wound care activities  Outcome: Outcome Not Met, Continue to Monitor        Problem: DISCHARGE PLANNING - CARE MANAGEMENT  Goal: Discharge to post-acute care or home with appropriate resources  Description: INTERVENTIONS:  - Conduct assessment to determine patient/family and health care team treatment goals, and need for post-acute services based on payer coverage, community resources, and patient preferences, and barriers to discharge  - Address psychosocial, clinical, and financial barriers to discharge as identified in assessment in conjunction with the patient/family and health care team  - Arrange appropriate level of post-acute services according to patients   needs and preference and payer coverage in collaboration with the physician and health care team  - Communicate with and update the patient/family, physician, and health care team regarding progress on the discharge plan  - Arrange appropriate transportation to post-acute venues  Outcome: Progressing     Pt progressing;  No DC date - awaiting MA re-certification and transfer to DC to [facility]

## 2022-08-21 NOTE — DISCHARGE INSTR - OTHER ORDERS
You are being discharged to your daughter's home located at [address] [place] [address] (# [phone])  Triggers you have identified during your hospitalization that led to your admission was increased depression and anxiety  Coping skills you have identified during your hospitalization include jewelry making, beading, baking   If you are unable to deal with your distressed mood alone please contact your daughter, Molly Greene, at [phone]  If that is not effective and you continue to have suicidal ideations and/or are in a distressed mood please contact [facility]  E[facility] Cantrell [facility] [phone], dial [phone] or go to the nearest emergency center  Crisis Information:  [facility] Crisis Hotline: 472.823.7428  *Alcohol Anonymous: 983.480.5565  *[place] [facility] [employer] Alcohol Commission: (331) 911-4798  *National Suicide Prevention Lifeline:  2-783-009-828.567.3996  [facility] [employer]: 194.831.8822/Website: www trent org  *Substance Abuse and [address] [facility] [employer], which is a confidential, free, 24-hour-a-day, 365-day-a-year, information service for individuals and family members facing mental health and/or substance use disorders  This service provides referrals to local treatment facilities, support groups, and community-based organizations  Callers can also order free publications and other information  Call 8-191.315.2667/Website: www [facility] gov  *[facility] 2-1-1: This is a toll free, confidential, 24-hour-a-day service which connects you to a community  in your area who can help you find services and resources that are available to you locally and provide critical services that can improve and save lives    Call: 211  /Website: https://pete penn/
21-Aug-2022 23:02

## 2023-03-20 NOTE — PROGRESS NOTES
07/08/21 1415   Activity/Group Checklist   Group   (Open art expression)   Attendance Did not attend  (AT group offered; pt elected not to attend) stated